# Patient Record
Sex: FEMALE | Race: BLACK OR AFRICAN AMERICAN | NOT HISPANIC OR LATINO | Employment: OTHER | URBAN - METROPOLITAN AREA
[De-identification: names, ages, dates, MRNs, and addresses within clinical notes are randomized per-mention and may not be internally consistent; named-entity substitution may affect disease eponyms.]

---

## 2017-02-05 ENCOUNTER — HOSPITAL ENCOUNTER (EMERGENCY)
Facility: HOSPITAL | Age: 72
Discharge: HOME | End: 2017-02-05
Attending: EMERGENCY MEDICINE
Payer: MEDICARE

## 2017-02-05 VITALS
TEMPERATURE: 98 F | HEIGHT: 63 IN | DIASTOLIC BLOOD PRESSURE: 81 MMHG | RESPIRATION RATE: 20 BRPM | WEIGHT: 190 LBS | SYSTOLIC BLOOD PRESSURE: 180 MMHG | OXYGEN SATURATION: 100 % | HEART RATE: 67 BPM | BODY MASS INDEX: 33.66 KG/M2

## 2017-02-05 DIAGNOSIS — R42 LIGHTHEADED: Primary | ICD-10-CM

## 2017-02-05 DIAGNOSIS — R51.9 HEADACHE, UNSPECIFIED HEADACHE TYPE: ICD-10-CM

## 2017-02-05 LAB
ALBUMIN SERPL BCP-MCNC: 3.6 G/DL
ALP SERPL-CCNC: 70 U/L
ALT SERPL W/O P-5'-P-CCNC: 11 U/L
ANION GAP SERPL CALC-SCNC: 7 MMOL/L
AST SERPL-CCNC: 14 U/L
BASOPHILS # BLD AUTO: 0.06 K/UL
BASOPHILS NFR BLD: 0.8 %
BILIRUB SERPL-MCNC: 0.6 MG/DL
BILIRUB UR QL STRIP: NEGATIVE
BUN SERPL-MCNC: 13 MG/DL
CALCIUM SERPL-MCNC: 9 MG/DL
CHLORIDE SERPL-SCNC: 101 MMOL/L
CLARITY UR REFRACT.AUTO: CLEAR
CO2 SERPL-SCNC: 30 MMOL/L
COLOR UR AUTO: YELLOW
CREAT SERPL-MCNC: 0.7 MG/DL
DIFFERENTIAL METHOD: NORMAL
EOSINOPHIL # BLD AUTO: 0.2 K/UL
EOSINOPHIL NFR BLD: 2.8 %
ERYTHROCYTE [DISTWIDTH] IN BLOOD BY AUTOMATED COUNT: 13.1 %
EST. GFR  (AFRICAN AMERICAN): >60 ML/MIN/1.73 M^2
EST. GFR  (NON AFRICAN AMERICAN): >60 ML/MIN/1.73 M^2
GLUCOSE SERPL-MCNC: 103 MG/DL
GLUCOSE UR QL STRIP: NEGATIVE
HCT VFR BLD AUTO: 39.6 %
HGB BLD-MCNC: 13 G/DL
HGB UR QL STRIP: NEGATIVE
KETONES UR QL STRIP: NEGATIVE
LEUKOCYTE ESTERASE UR QL STRIP: NEGATIVE
LYMPHOCYTES # BLD AUTO: 3.2 K/UL
LYMPHOCYTES NFR BLD: 41.7 %
MCH RBC QN AUTO: 28.4 PG
MCHC RBC AUTO-ENTMCNC: 32.8 %
MCV RBC AUTO: 87 FL
MONOCYTES # BLD AUTO: 0.5 K/UL
MONOCYTES NFR BLD: 6 %
NEUTROPHILS # BLD AUTO: 3.8 K/UL
NEUTROPHILS NFR BLD: 48.6 %
NITRITE UR QL STRIP: NEGATIVE
PH UR STRIP: 7 [PH] (ref 5–8)
PLATELET # BLD AUTO: 272 K/UL
PMV BLD AUTO: 9.2 FL
POCT GLUCOSE: 107 MG/DL (ref 70–110)
POTASSIUM SERPL-SCNC: 3.8 MMOL/L
PROT SERPL-MCNC: 7.6 G/DL
PROT UR QL STRIP: NEGATIVE
RBC # BLD AUTO: 4.58 M/UL
SODIUM SERPL-SCNC: 138 MMOL/L
SP GR UR STRIP: 1.01 (ref 1–1.03)
TROPONIN I SERPL DL<=0.01 NG/ML-MCNC: 0.01 NG/ML
URN SPEC COLLECT METH UR: NORMAL
UROBILINOGEN UR STRIP-ACNC: NEGATIVE EU/DL
WBC # BLD AUTO: 7.77 K/UL

## 2017-02-05 PROCEDURE — 25000003 PHARM REV CODE 250: Performed by: EMERGENCY MEDICINE

## 2017-02-05 PROCEDURE — 93010 ELECTROCARDIOGRAM REPORT: CPT | Mod: ,,, | Performed by: INTERNAL MEDICINE

## 2017-02-05 PROCEDURE — 80053 COMPREHEN METABOLIC PANEL: CPT

## 2017-02-05 PROCEDURE — 63600175 PHARM REV CODE 636 W HCPCS: Performed by: EMERGENCY MEDICINE

## 2017-02-05 PROCEDURE — 87086 URINE CULTURE/COLONY COUNT: CPT

## 2017-02-05 PROCEDURE — 85025 COMPLETE CBC W/AUTO DIFF WBC: CPT

## 2017-02-05 PROCEDURE — 81003 URINALYSIS AUTO W/O SCOPE: CPT

## 2017-02-05 PROCEDURE — 96361 HYDRATE IV INFUSION ADD-ON: CPT

## 2017-02-05 PROCEDURE — 99284 EMERGENCY DEPT VISIT MOD MDM: CPT | Mod: ,,, | Performed by: EMERGENCY MEDICINE

## 2017-02-05 PROCEDURE — 84484 ASSAY OF TROPONIN QUANT: CPT

## 2017-02-05 PROCEDURE — 99284 EMERGENCY DEPT VISIT MOD MDM: CPT | Mod: 25

## 2017-02-05 PROCEDURE — 96374 THER/PROPH/DIAG INJ IV PUSH: CPT

## 2017-02-05 PROCEDURE — 93005 ELECTROCARDIOGRAM TRACING: CPT

## 2017-02-05 RX ORDER — DIPHENHYDRAMINE HCL 25 MG
25 CAPSULE ORAL
Status: COMPLETED | OUTPATIENT
Start: 2017-02-05 | End: 2017-02-05

## 2017-02-05 RX ORDER — METOCLOPRAMIDE HYDROCHLORIDE 5 MG/ML
INJECTION INTRAMUSCULAR; INTRAVENOUS
Status: DISPENSED
Start: 2017-02-05 | End: 2017-02-05

## 2017-02-05 RX ORDER — METOCLOPRAMIDE HYDROCHLORIDE 5 MG/ML
10 INJECTION INTRAMUSCULAR; INTRAVENOUS
Status: COMPLETED | OUTPATIENT
Start: 2017-02-05 | End: 2017-02-05

## 2017-02-05 RX ORDER — ATORVASTATIN CALCIUM 20 MG/1
20 TABLET, FILM COATED ORAL DAILY
COMMUNITY
End: 2022-08-04 | Stop reason: SDUPTHER

## 2017-02-05 RX ORDER — DIPHENHYDRAMINE HCL 25 MG
CAPSULE ORAL
Status: DISPENSED
Start: 2017-02-05 | End: 2017-02-06

## 2017-02-05 RX ORDER — ASPIRIN 81 MG/1
81 TABLET ORAL DAILY
COMMUNITY

## 2017-02-05 RX ADMIN — SODIUM CHLORIDE 500 ML: 0.9 INJECTION, SOLUTION INTRAVENOUS at 12:02

## 2017-02-05 RX ADMIN — DIPHENHYDRAMINE HYDROCHLORIDE 25 MG: 25 CAPSULE ORAL at 12:02

## 2017-02-05 RX ADMIN — METOCLOPRAMIDE 10 MG: 5 INJECTION, SOLUTION INTRAMUSCULAR; INTRAVENOUS at 12:02

## 2017-02-05 NOTE — ED NOTES
Sitting in chair visiting with daughter at bedside. No distress noted. Waiting to speak to MD. Denies further needs. Safety maintained. Call bell in reach.

## 2017-02-05 NOTE — ED NOTES
Patient identifiers verified and correct for eLsly Cecil.   LOC: The patient is awake, alert and aware of environment with an appropriate affect, the patient is oriented x 3 and speaking appropriately.   APPEARANCE: Patient appears comfortable and in no acute distress, patient is clean and well groomed.  SKIN: The skin is warm and dry, color consistent with ethnicity, patient has normal skin turgor and moist mucus membranes, skin intact, no breakdown or bruising noted.   MUSCULOSKELETAL: Patient moving all extremities spontaneously, no swelling noted.  RESPIRATORY: Airway is open and patent, respirations are spontaneous, patient has a normal effort and rate, no accessory muscle use noted, pt O2 sats noted at 97% on room air.  CARDIAC:Patient has a normal rate and regular rhythm, no edema noted, capillary refill < 3 seconds.   GASTRO: Soft and non tender to palpation, no distention noted, normoactive bowel sounds present in all four quadrants. Pt states bowel movements have been regular.  : Pt denies any pain or frequency with urination.  NEURO: Pt opens eyes spontaneously, behavior appropriate to situation, follows commands, facial expression symmetrical, bilateral hand grasp equal and even, purposeful motor response noted, normal sensation in all extremities when touched with a finger.

## 2017-02-05 NOTE — DISCHARGE INSTRUCTIONS
Dizziness (Uncertain Cause)  Dizziness is a common symptom. It may be described as lightheadedness, spinning, or feeling like you are going to faint. Dizziness can have many causes.  Be sure to tell the healthcare provider about:  · All medicines you take, including prescription, over-the-counter, herbs, and supplements  · Any other symptoms you have  · Any health problems you are being treated for  · Anything that causes the dizziness to get worse or better  Today's exam did not show an exact cause for your dizziness. Other tests may be needed. Follow up with your healthcare provider.  Home care  · Dizziness that occurs with sudden standing may be a sign of mild dehydration. Drink extra fluids for the next few days.  · If you recently started a new medicine, stopped a medicine, or had the dose of a current medicine changed, talk with the prescribing healthcare provider. Your medicine plan may need adjustment.  · If dizziness lasts more than a few seconds, sit or lie down until it passes. This may help prevent injury in case you pass out.  · Do not drive or use power tools or dangerous equipment until you have had no dizziness for at least 48 hours.  Follow-up care  Follow up with your healthcare provider for further evaluation within the next 7 days or as advised.  When to seek medical advice  Call your healthcare provider for any of the following:  · Worsening of symptoms or new symptoms  · Passing out or seizure  · Repeated vomiting  · Headache  · Palpitations (the sense that your heart is fluttering or beating fast or hard)  · Shortness of breath  · Blood in vomit or stool (black or red color)  · Weakness of an arm or leg or one side of the face  · Vision or hearing changes  · Trouble walking or speaking  · Chest, arm, neck, back, or jaw pain  Date Last Reviewed: 8/23/2015  © 8062-4361 [employer]. [address] [place] [address], [place] [address]. All rights reserved. This information is not intended as  "a substitute for professional medical care. Always follow your healthcare professional's instructions.        Headache, Unspecified    A number of things can cause headaches. The cause of your headache isnt clear. But it doesnt seem to be a sign of any serious illness.  You could have a tension headache or a migraine headache.  Stress can cause a tension headache. This can happen if you tense the muscles of your shoulders, neck, and scalp without knowing it. If this stress lasts long enough, you may develop a tension headache.  It is not clear why migraines occur, but certain things called" triggers" can raise the risk of having a migraine attack. Migraine triggers may include emotional stress or depression, or by hormone changes during the menstrual cycle. Other triggers include birth control pills and other medicines, alcohol or caffeine, foods with tyramine (such as aged cheese, wine), eyestrain, weather changes, missed meals, and lack of sleep or oversleeping.  Other causes of headache include:  · Viral illness with high fever  · Head injury with concussion  · Sinus, ear, or throat infection  · Dental pain and jaw joint (TMJ) pain  More serious but less common causes of headache include stroke, brain hemorrhage, brain tumor, meningitis, and encephalitis.  Home care  Follow these tips when taking care of yourself at home:  · Dont drive yourself home if you were given pain medicine for your headache. Instead, have someone else drive you home. Try to sleep when you get home. You should feel much better when you wake up.  · Apply heat to the back of your neck to ease a neck muscle spasm. Take care of a migraine headache by putting an ice pack on your forehead or at the base of your skull.  · If you have nausea or vomiting, eat a light diet until your headache eases.  · If you have a migraine headache, use sunglasses when in the daylight or around bright indoor lighting until your symptoms get better. Bright " glaring light can make this type of headache worse.  Follow-up care  Follow up with your healthcare provider, or as advised. Talk with your provider if you have frequent headaches. He or she can help figure out a treatment plan. By knowing the earliest signs of headache, and starting treatment right away, you may be able to stop the pain yourself.  When to seek medical advice  Call your healthcare provider right away if any of these occur:  · Your head pain suddenly gets worse after sexual intercourse or strenuous activity  · Your head pain doesnt get better within 24 hours  · You arent able to keep liquids down (repeated vomiting)  · Fever of 100.4ºF (38ºC) or higher, or as directed by your healthcare provider  · Stiff neck  · Extreme drowsiness, confusion, or fainting  · Dizziness or dizziness with spinning sensation (vertigo)  · Weakness in an arm or leg or one side of your face  · You have trouble talking or seeing  Date Last Reviewed: 8/1/2016  © 3603-3490 [employer] [address] [place] [address], [place] [address]. All rights reserved. This information is not intended as a substitute for professional medical care. Always follow your healthcare professional's instructions.

## 2017-02-05 NOTE — ED NOTES
Resting in bed. AAOX4. No distress noted. Denies c/o headache or N/V at present. Safety maintained. Call bell in reach. Family member at bedside.

## 2017-02-05 NOTE — ED NOTES
"Pt reports feeling "anxious" after receiving reglan IVP. Denies chest pain or SOB. No acute distress noted. /79, HR 75, room air sats 100%, Resp 20. MD notified.   "
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
## 2017-02-05 NOTE — ED AVS SNAPSHOT
[facility]  [address] [facility] [address]               Lesly Jacob   2017 10:21 AM   ED    Description:  Female : 1945   Department:  [facility]           Your Care was Coordinated By:     Provider Role From To    Axel Najera MD Attending Provider 17 1205 --      Reason for Visit     Headache           Diagnoses this Visit        Comments    Lightheaded    -  Primary     Headache, unspecified headache type           ED Disposition     None           To Do List           Follow-up Information     Schedule an appointment as soon as possible for a visit with Karson Beauchamp MD.    Specialty:  Cardiology    Why:  for follow up this week     Contact information:    [address] [facility] [address]  [phone]          Follow up with [facility].    Specialty:  Emergency Medicine    Why:  If symptoms worsen or you develop any new, concerning symptoms return immediately    Contact information:    [address] [facility] [id number] [phone]      [facility]     [facility] -  Assistance  Registered nurses in the [facility] provide clinical advisement, health education, appointment booking, and other advisory services.  Call for this free service at [phone].             Medications           Message regarding Medications     Verify the changes and/or additions to your medication regime listed below are the same as discussed with your clinician today.  If any of these changes or additions are incorrect, please notify your healthcare provider.        These medications were administered today        Dose Freq    sodium chloride 0.9% bolus 500 mL 500 mL ED 1 Time    Sig: Inject 500 mLs into the vein ED 1 Time.    Class: Normal    Route: Intravenous    metoclopramide HCl injection 10 mg 10 mg ED 1 Time    Sig: Inject  "2 mLs (10 mg total) into the vein ED 1 Time.    Class: Normal    Route: Intravenous    diphenhydrAMINE capsule 25 mg 25 mg ED 1 Time    Sig: Take 1 each (25 mg total) by mouth ED 1 Time.    Class: Normal    Route: Oral           Verify that the below list of medications is an accurate representation of the medications you are currently taking.  If none reported, the list may be blank. If incorrect, please contact your healthcare provider. Carry this list with you in case of emergency.           Current Medications     amlodipine (NORVASC) 10 MG tablet Take 10 mg by mouth once daily.    aspirin (ECOTRIN) 81 MG EC tablet Take 81 mg by mouth once daily.    atorvastatin (LIPITOR) 20 MG tablet Take 20 mg by mouth once daily.    losartan (COZAAR) 100 MG tablet Take 100 mg by mouth once daily.    metformin (GLUCOPHAGE) 500 MG tablet Take 500 mg by mouth 2 (two) times daily with meals.    albuterol 90 mcg/actuation HFAA Inhale 1-2 puffs into the lungs every 6 (six) hours as needed.    benzonatate (TESSALON) 100 MG capsule Take 100 mg by mouth 3 (three) times daily as needed.    calcium carbonate-vitamin D3 (CALCIUM 500 + D, D3,) 500-125 mg-unit per tablet Take 1 tablet by mouth once daily.      cetirizine (ZYRTEC) 10 MG tablet Take 1 tablet (10 mg total) by mouth once daily.    hydrochlorothiazide (MICROZIDE) 12.5 mg capsule Take 25 mg by mouth once daily.     metoprolol succinate (TOPROL-XL) 50 MG 24 hr tablet Take 50 mg by mouth once daily.    propylene glycol (SYSTANE BALANCE) 0.6 % Drop Apply 1 drop to eye 4 (four) times daily as needed.           Clinical Reference Information           Your Vitals Were     BP Pulse Temp Resp Height Weight    174/79 (BP Location: Right arm, Patient Position: Standing, BP Method: Automatic) 69 98.1 °F (36.7 °C) (Oral) 18 5' 3" (1.6 m) 86.2 kg (190 lb)    SpO2 BMI             99% 33.66 kg/m2         Allergies as of 2/5/2017        Reactions    Amoxicillin Nausea And Vomiting    "   Immunizations Administered on Date of Encounter - 2/5/2017     None      ED Micro, Lab, POCT     Start Ordered       Status Ordering Provider    02/05/17 1204 02/05/17 1204  Urine culture  Once      In process     02/05/17 1151 02/05/17 1151  POCT glucose  Once      Final result     02/05/17 1044 02/05/17 1045  Urine culture  Add-on      Completed     02/05/17 1043 02/05/17 1045  CBC auto differential  STAT      Final result     02/05/17 1043 02/05/17 1045  Comprehensive metabolic panel  STAT      Final result     02/05/17 1043 02/05/17 1045  Troponin I  STAT      Final result     02/05/17 1043 02/05/17 1045  POCT glucose  Once      Acknowledged     02/05/17 1043 02/05/17 1045  Urinalysis  STAT      Final result       ED Imaging Orders     Start Ordered       Status Ordering Provider    02/05/17 1058 02/05/17 1058    1 time imaging,   Status:  Canceled      Canceled     02/05/17 1043 02/05/17 1045  CT Head Without Contrast  1 time imaging      Preliminary result         Discharge Instructions         Dizziness (Uncertain Cause)  Dizziness is a common symptom. It may be described as lightheadedness, spinning, or feeling like you are going to faint. Dizziness can have many causes.  Be sure to tell the healthcare provider about:  · All medicines you take, including prescription, over-the-counter, herbs, and supplements  · Any other symptoms you have  · Any health problems you are being treated for  · Anything that causes the dizziness to get worse or better  Today's exam did not show an exact cause for your dizziness. Other tests may be needed. Follow up with your healthcare provider.  Home care  · Dizziness that occurs with sudden standing may be a sign of mild dehydration. Drink extra fluids for the next few days.  · If you recently started a new medicine, stopped a medicine, or had the dose of a current medicine changed, talk with the prescribing healthcare provider. Your medicine plan may need adjustment.  · If  "dizziness lasts more than a few seconds, sit or lie down until it passes. This may help prevent injury in case you pass out.  · Do not drive or use power tools or dangerous equipment until you have had no dizziness for at least 48 hours.  Follow-up care  Follow up with your healthcare provider for further evaluation within the next 7 days or as advised.  When to seek medical advice  Call your healthcare provider for any of the following:  · Worsening of symptoms or new symptoms  · Passing out or seizure  · Repeated vomiting  · Headache  · Palpitations (the sense that your heart is fluttering or beating fast or hard)  · Shortness of breath  · Blood in vomit or stool (black or red color)  · Weakness of an arm or leg or one side of the face  · Vision or hearing changes  · Trouble walking or speaking  · Chest, arm, neck, back, or jaw pain  Date Last Reviewed: 8/23/2015  © 4585-4320 [employer] [address] [place] [address]. All rights reserved. This information is not intended as a substitute for professional medical care. Always follow your healthcare professional's instructions.        Headache, Unspecified    A number of things can cause headaches. The cause of your headache isnt clear. But it doesnt seem to be a sign of any serious illness.  You could have a tension headache or a migraine headache.  Stress can cause a tension headache. This can happen if you tense the muscles of your shoulders, neck, and scalp without knowing it. If this stress lasts long enough, you may develop a tension headache.  It is not clear why migraines occur, but certain things called" triggers" can raise the risk of having a migraine attack. Migraine triggers may include emotional stress or depression, or by hormone changes during the menstrual cycle. Other triggers include birth control pills and other medicines, alcohol or caffeine, foods with tyramine (such as aged cheese, wine), eyestrain, weather changes, " missed meals, and lack of sleep or oversleeping.  Other causes of headache include:  · Viral illness with high fever  · Head injury with concussion  · Sinus, ear, or throat infection  · Dental pain and jaw joint (TMJ) pain  More serious but less common causes of headache include stroke, brain hemorrhage, brain tumor, meningitis, and encephalitis.  Home care  Follow these tips when taking care of yourself at home:  · Dont drive yourself home if you were given pain medicine for your headache. Instead, have someone else drive you home. Try to sleep when you get home. You should feel much better when you wake up.  · Apply heat to the back of your neck to ease a neck muscle spasm. Take care of a migraine headache by putting an ice pack on your forehead or at the base of your skull.  · If you have nausea or vomiting, eat a light diet until your headache eases.  · If you have a migraine headache, use sunglasses when in the daylight or around bright indoor lighting until your symptoms get better. Bright glaring light can make this type of headache worse.  Follow-up care  Follow up with your healthcare provider, or as advised. Talk with your provider if you have frequent headaches. He or she can help figure out a treatment plan. By knowing the earliest signs of headache, and starting treatment right away, you may be able to stop the pain yourself.  When to seek medical advice  Call your healthcare provider right away if any of these occur:  · Your head pain suddenly gets worse after sexual intercourse or strenuous activity  · Your head pain doesnt get better within 24 hours  · You arent able to keep liquids down (repeated vomiting)  · Fever of 100.4ºF (38ºC) or higher, or as directed by your healthcare provider  · Stiff neck  · Extreme drowsiness, confusion, or fainting  · Dizziness or dizziness with spinning sensation (vertigo)  · Weakness in an arm or leg or one side of your face  · You have trouble talking or  seeing  Date Last Reviewed: 8/1/2016  © 8594-5441 [employer] [address] [place] [address], [place] [address]. All rights reserved. This information is not intended as a substitute for professional medical care. Always follow your healthcare professional's instructions.          [facility] Sign-Up     Activating your [facility] account is as easy as 1-2-3!     1) Visit [email or web address], select Sign Up Now, enter this activation code and your date of birth, then select Next.  [email or web address]  Expires: 3/22/2017  3:02 PM      2) Create a username and password to use when you visit [facility] in the future and select a security question in case you lose your password and select Next.    3) Enter your e-mail address and click Sign Up!    Additional Information  If you have questions, please e-mail [email or web address] or call [phone] to talk to our [facility] staff. Remember, [facility] is NOT to be used for urgent needs. For medical emergencies, dial 911.          [facility] complies with applicable Federal civil rights laws and does not discriminate on the basis of race, color, national origin, age, disability, or sex.        Language Assistance Services     ATTENTION: Language assistance services are available, free of charge. Please call [phone].      ATENCIÓN: Si habla español, tiene a medina disposición servicios gratuitos de asistencia lingüística. Llame al [phone].     CHÚ Ý: N?u b?n nói Ti?ng Vi?t, có các d?ch v? h? tr? ngôn ng? mi?n phí dành cho b?n. G?i s? [phone].

## 2017-02-06 LAB — BACTERIA UR CULT: NO GROWTH

## 2017-08-16 PROCEDURE — 99284 EMERGENCY DEPT VISIT MOD MDM: CPT | Mod: ,,, | Performed by: PHYSICIAN ASSISTANT

## 2017-08-16 PROCEDURE — 99283 EMERGENCY DEPT VISIT LOW MDM: CPT

## 2017-08-17 ENCOUNTER — HOSPITAL ENCOUNTER (EMERGENCY)
Facility: HOSPITAL | Age: 72
Discharge: HOME | End: 2017-08-17
Attending: EMERGENCY MEDICINE | Admitting: EMERGENCY MEDICINE
Payer: MEDICARE

## 2017-08-17 VITALS
OXYGEN SATURATION: 97 % | TEMPERATURE: 99 F | RESPIRATION RATE: 18 BRPM | SYSTOLIC BLOOD PRESSURE: 172 MMHG | WEIGHT: 192 LBS | HEART RATE: 66 BPM | BODY MASS INDEX: 37.69 KG/M2 | HEIGHT: 60 IN | DIASTOLIC BLOOD PRESSURE: 74 MMHG

## 2017-08-17 DIAGNOSIS — B35.4 TINEA CORPORIS: Primary | ICD-10-CM

## 2017-08-17 PROCEDURE — 25000003 PHARM REV CODE 250: Performed by: PHYSICIAN ASSISTANT

## 2017-08-17 RX ORDER — MICONAZOLE NITRATE 2 %
POWDER (GRAM) TOPICAL 2 TIMES DAILY
Status: DISCONTINUED | OUTPATIENT
Start: 2017-08-17 | End: 2017-08-17 | Stop reason: HOSPADM

## 2017-08-17 RX ORDER — CLOTRIMAZOLE 1 %
CREAM (GRAM) TOPICAL
Qty: 60 G | Refills: 2 | Status: SHIPPED | OUTPATIENT
Start: 2017-08-17

## 2017-08-17 RX ADMIN — MICONAZOLE NITRATE: 20 POWDER TOPICAL at 04:08

## 2017-08-17 NOTE — ED PROVIDER NOTES
Encounter Date: 8/16/2017    SCRIBE #1 NOTE: I, Vee Ramirez, am scribing for, and in the presence of,  Dr. Klein. I have scribed the following portions of the note - the APC attestation.       History     Chief Complaint   Patient presents with    Rash     to low abdominal area. pt denies any new medication, detergent or food     71-year-old female presents to the ER for evaluation of a rash.  Rash is present bilaterally below her abdominal fold.  Rash has been present now for 3 days and worsening.  She describes diffuse itching and burning.  She has no fever chills nausea or vomiting.  She has never had this rash before.  She has tried applying cortisone cream to the area without any improvement.          Review of patient's allergies indicates:   Allergen Reactions    Amoxicillin Nausea And Vomiting     Past Medical History:   Diagnosis Date    Diabetes mellitus     Hypertension     Pneumonia     Pneumonia      Past Surgical History:   Procedure Laterality Date    HYSTERECTOMY       No family history on file.  Social History   Substance Use Topics    Smoking status: Never Smoker    Smokeless tobacco: Not on file    Alcohol use No     Review of Systems   Constitutional: Negative for fever.   HENT: Negative for sore throat.    Respiratory: Negative for shortness of breath.    Cardiovascular: Negative for chest pain.   Gastrointestinal: Negative for nausea.   Genitourinary: Negative for dysuria.   Musculoskeletal: Negative for back pain.   Skin: Positive for rash.   Neurological: Negative for weakness.   Hematological: Does not bruise/bleed easily.       Physical Exam     Initial Vitals [08/16/17 2016]   BP Pulse Resp Temp SpO2   134/60 76 19 98.5 °F (36.9 °C) 96 %      MAP       84.67         Physical Exam    Constitutional: Vital signs are normal. She appears well-developed and well-nourished.   HENT:   Head: Normocephalic and atraumatic.   Eyes: Conjunctivae are normal.   Cardiovascular: Normal rate  and regular rhythm.   Abdominal: Soft. Normal appearance, normal aorta and bowel sounds are normal.   Musculoskeletal: Normal range of motion.   Neurological: She is alert and oriented to person, place, and time.   Skin: Skin is warm and intact.        Both inguinal creases / just below the abdomen, the area is erythematous and macerated.  The area is very tender to exam.  No abscesses.  No drainage.   Psychiatric: She has a normal mood and affect. Her speech is normal and behavior is normal. Cognition and memory are normal.         ED Course   Procedures  Labs Reviewed - No data to display          Medical Decision Making:   History:   Old Medical Records: I decided to obtain old medical records.  ED Management:  71-year-old female with what appears to be a tinea infection to the skin folds of the lower abdomen.  I will treat in the ED with nystatin and discharged home with clotrimazole.  Recommended Benadryl for itching.  Return instructions given.  Patient stable.            Scribe Attestation:   Scribe #1: I performed the above scribed service and the documentation accurately describes the services I performed. I attest to the accuracy of the note.    Attending Attestation:     Physician Attestation Statement for NP/PA:   I discussed this assessment and plan of this patient with the NP/PA, but I did not personally examine the patient. The face to face encounter was performed by the NP/PA.    Other NP/PA Attestation Additions:    History of Present Illness: Rash         Physician Attestation for Scribe:  Physician Attestation Statement for Scribe #1: I, Dr. Klein, reviewed documentation, as scribed by Vee Ramirez in my presence, and it is both accurate and complete.                 ED Course     Clinical Impression:   The encounter diagnosis was Tinea corporis.    Disposition:   Disposition: Discharged  Condition: Stable                        Carlton Erazo PA-C  08/17/17 0412       Mushtaq Klein III,  MD  08/17/17 0419

## 2017-08-17 NOTE — DISCHARGE INSTRUCTIONS
Use topical cream as directed twice daily  Follow-up with your primary care provider  Return to the ER for any new symptoms  Take Benadryl over-the-counter, 50 mg as needed for itching

## 2017-08-17 NOTE — ED TRIAGE NOTES
Lesly Jacob, a 71 y.o. female presents to the ED c/o skin breakdown below abdomen. Pt reports attempting multiple creams without relief. +Itching.      Chief Complaint   Patient presents with    Rash     to low abdominal area. pt denies any new medication, detergent or food     Review of patient's allergies indicates:   Allergen Reactions    Amoxicillin Nausea And Vomiting     Past Medical History:   Diagnosis Date    Diabetes mellitus     Hypertension     Pneumonia     Pneumonia         Adult Physical Assessment  LOC: Lesly Jacob, 71 y.o. female verified via two identifiers.  The patient is awake, alert, oriented and speaking appropriately at this time.  APPEARANCE: Patient resting comfortably and appears to be in no acute distress at this time. Patient is clean and well groomed, patient's clothing is properly fastened.  SKIN:The skin is warm and dry, color consistent with ethnicity, patient has normal skin turgor and moist mucus membranes, no breakdown or brusing noted. Skin breakdown and irritation noted to lower abdomen.   MUSCULOSKELETAL: Patient moving all extremities well, no obvious swelling or deformities noted.  RESPIRATORY: Airway is open and patent, respirations are spontaneous, patient has a normal effort and rate, no accessory muscle use noted.  CARDIAC: Patient has a normal rate and rhythm, no periphreal edema noted in any extremity, capillary refill < 3 seconds in all extremities  ABDOMEN: Soft and non tender to palpation, no abdominal distention noted. Bowel sounds present in all four quadrants.  NEUROLOGIC: Eyes open spontaneously, behavior appropriate to situation, follows commands, facial expression symmetrical, bilateral hand grasp equal and even, purposeful motor response noted, normal sensation in all extremities when touched with a finger.

## 2017-10-22 ENCOUNTER — HOSPITAL ENCOUNTER (EMERGENCY)
Facility: HOSPITAL | Age: 72
Discharge: HOME | End: 2017-10-22
Attending: EMERGENCY MEDICINE
Payer: MEDICARE

## 2017-10-22 VITALS
BODY MASS INDEX: 32.44 KG/M2 | RESPIRATION RATE: 17 BRPM | SYSTOLIC BLOOD PRESSURE: 157 MMHG | HEART RATE: 70 BPM | TEMPERATURE: 98 F | DIASTOLIC BLOOD PRESSURE: 71 MMHG | OXYGEN SATURATION: 98 % | HEIGHT: 64 IN | WEIGHT: 190 LBS

## 2017-10-22 DIAGNOSIS — R00.2 PALPITATIONS: Primary | ICD-10-CM

## 2017-10-22 DIAGNOSIS — R53.1 WEAKNESS: ICD-10-CM

## 2017-10-22 LAB
ALBUMIN SERPL BCP-MCNC: 3.5 G/DL
ALP SERPL-CCNC: 90 U/L
ALT SERPL W/O P-5'-P-CCNC: 14 U/L
ANION GAP SERPL CALC-SCNC: 16 MMOL/L
AST SERPL-CCNC: 17 U/L
BACTERIA #/AREA URNS AUTO: ABNORMAL /HPF
BASOPHILS # BLD AUTO: 0.07 K/UL
BASOPHILS NFR BLD: 0.6 %
BILIRUB SERPL-MCNC: 0.3 MG/DL
BILIRUB UR QL STRIP: NEGATIVE
BUN SERPL-MCNC: 16 MG/DL
CALCIUM SERPL-MCNC: 9.2 MG/DL
CHLORIDE SERPL-SCNC: 103 MMOL/L
CLARITY UR REFRACT.AUTO: CLEAR
CO2 SERPL-SCNC: 22 MMOL/L
COLOR UR AUTO: COLORLESS
CREAT SERPL-MCNC: 1 MG/DL
DIFFERENTIAL METHOD: ABNORMAL
EOSINOPHIL # BLD AUTO: 0.3 K/UL
EOSINOPHIL NFR BLD: 2.8 %
ERYTHROCYTE [DISTWIDTH] IN BLOOD BY AUTOMATED COUNT: 13.2 %
EST. GFR  (AFRICAN AMERICAN): >60 ML/MIN/1.73 M^2
EST. GFR  (NON AFRICAN AMERICAN): 56 ML/MIN/1.73 M^2
GLUCOSE SERPL-MCNC: 208 MG/DL
GLUCOSE UR QL STRIP: ABNORMAL
HCT VFR BLD AUTO: 36.7 %
HGB BLD-MCNC: 12.3 G/DL
HGB UR QL STRIP: ABNORMAL
IMM GRANULOCYTES # BLD AUTO: 0.07 K/UL
IMM GRANULOCYTES NFR BLD AUTO: 0.6 %
KETONES UR QL STRIP: NEGATIVE
LEUKOCYTE ESTERASE UR QL STRIP: ABNORMAL
LYMPHOCYTES # BLD AUTO: 4.9 K/UL
LYMPHOCYTES NFR BLD: 45.7 %
MCH RBC QN AUTO: 28.9 PG
MCHC RBC AUTO-ENTMCNC: 33.5 G/DL
MCV RBC AUTO: 86 FL
MICROSCOPIC COMMENT: ABNORMAL
MONOCYTES # BLD AUTO: 0.7 K/UL
MONOCYTES NFR BLD: 6.4 %
NEUTROPHILS # BLD AUTO: 4.7 K/UL
NEUTROPHILS NFR BLD: 43.9 %
NITRITE UR QL STRIP: NEGATIVE
NRBC BLD-RTO: 0 /100 WBC
PH UR STRIP: 6 [PH] (ref 5–8)
PLATELET # BLD AUTO: 319 K/UL
PMV BLD AUTO: 9.2 FL
POTASSIUM SERPL-SCNC: 2.7 MMOL/L
PROT SERPL-MCNC: 8 G/DL
PROT UR QL STRIP: NEGATIVE
RBC # BLD AUTO: 4.26 M/UL
RBC #/AREA URNS AUTO: 1 /HPF (ref 0–4)
SODIUM SERPL-SCNC: 141 MMOL/L
SP GR UR STRIP: 1 (ref 1–1.03)
SQUAMOUS #/AREA URNS AUTO: 1 /HPF
TROPONIN I SERPL DL<=0.01 NG/ML-MCNC: <0.006 NG/ML
TSH SERPL DL<=0.005 MIU/L-ACNC: 1.53 UIU/ML
URN SPEC COLLECT METH UR: ABNORMAL
UROBILINOGEN UR STRIP-ACNC: NEGATIVE EU/DL
WBC # BLD AUTO: 10.78 K/UL
WBC #/AREA URNS AUTO: 6 /HPF (ref 0–5)
YEAST UR QL AUTO: ABNORMAL

## 2017-10-22 PROCEDURE — 84484 ASSAY OF TROPONIN QUANT: CPT

## 2017-10-22 PROCEDURE — 99284 EMERGENCY DEPT VISIT MOD MDM: CPT | Mod: 25

## 2017-10-22 PROCEDURE — 85025 COMPLETE CBC W/AUTO DIFF WBC: CPT

## 2017-10-22 PROCEDURE — 81001 URINALYSIS AUTO W/SCOPE: CPT

## 2017-10-22 PROCEDURE — 96366 THER/PROPH/DIAG IV INF ADDON: CPT

## 2017-10-22 PROCEDURE — 96365 THER/PROPH/DIAG IV INF INIT: CPT

## 2017-10-22 PROCEDURE — 84443 ASSAY THYROID STIM HORMONE: CPT

## 2017-10-22 PROCEDURE — 25000003 PHARM REV CODE 250: Performed by: GENERAL PRACTICE

## 2017-10-22 PROCEDURE — 80053 COMPREHEN METABOLIC PANEL: CPT

## 2017-10-22 PROCEDURE — 99284 EMERGENCY DEPT VISIT MOD MDM: CPT | Mod: ,,, | Performed by: EMERGENCY MEDICINE

## 2017-10-22 PROCEDURE — 93010 ELECTROCARDIOGRAM REPORT: CPT | Mod: ,,, | Performed by: INTERNAL MEDICINE

## 2017-10-22 PROCEDURE — 63600175 PHARM REV CODE 636 W HCPCS: Performed by: GENERAL PRACTICE

## 2017-10-22 PROCEDURE — 93005 ELECTROCARDIOGRAM TRACING: CPT

## 2017-10-22 RX ORDER — POTASSIUM CHLORIDE 7.45 MG/ML
10 INJECTION INTRAVENOUS
Status: COMPLETED | OUTPATIENT
Start: 2017-10-22 | End: 2017-10-22

## 2017-10-22 RX ORDER — POTASSIUM CHLORIDE 20 MEQ/15ML
60 SOLUTION ORAL
Status: COMPLETED | OUTPATIENT
Start: 2017-10-22 | End: 2017-10-22

## 2017-10-22 RX ADMIN — POTASSIUM CHLORIDE 60 MEQ: 20 SOLUTION ORAL at 03:10

## 2017-10-22 RX ADMIN — POTASSIUM CHLORIDE 10 MEQ: 10 INJECTION, SOLUTION INTRAVENOUS at 03:10

## 2017-10-22 NOTE — ED TRIAGE NOTES
"Pt reports feeling "jittery" that began around 2330. Denies chest pain or SOB. Reports hot flashes. Pt states that she feels like she may have an anxiety attack but does not want to have one. Pt calm and in no distress at this time.   "

## 2017-10-22 NOTE — MEDICAL/APP STUDENT
History     Chief Complaint   Patient presents with    Dizziness     Pt reports dizziness and weakness that began around 2330. Denies chest pain or SOB. Reports hot flashes.    Fatigue     HPI  Ms. Jacob is a 71 y.o. Female with pmh of HTN, DM2, and chronic bronchitis who presents with a 2 hrs hx of dizziness and lightheadedness that started late this evening while she was resting.  Patient reports feeling anxious lately that could be exacerbated by the recent loss of her 1st degree cousin.   She denies palpitations, chest pain, SOB. She reports good control of her diabetes and compliance with her metformin. Her last meal was last night around 7.30pm.    Of note, patient presented with an elevated BP of 180/100. She reports this is abnormal for her as her baseline BP is well controlled at home on norvasc 10mg. She has taken BP medication as per usual yesterday.      Past Medical History:   Diagnosis Date    Diabetes mellitus     Hypertension     Pneumonia     Pneumonia        Past Surgical History:   Procedure Laterality Date    HYSTERECTOMY         History reviewed. No pertinent family history.    Social History   Substance Use Topics    Smoking status: Never Smoker    Smokeless tobacco: Not on file    Alcohol use No       Review of Systems   Constitutional: Negative for activity change, appetite change and diaphoresis.   HENT: Negative for congestion, postnasal drip, rhinorrhea, sneezing and sore throat.    Eyes: Negative for photophobia, discharge and redness.   Respiratory: Negative for cough, chest tightness, shortness of breath, wheezing and stridor.    Cardiovascular: Negative for chest pain, palpitations and leg swelling.   Gastrointestinal: Negative for abdominal pain, constipation, diarrhea, nausea and vomiting.   Endocrine: Negative for cold intolerance and heat intolerance.   Musculoskeletal: Negative for arthralgias.   Skin: Negative for color change and rash.   Neurological: Positive for  "dizziness and light-headedness. Negative for tremors, syncope, speech difficulty and weakness.   Hematological: Negative for adenopathy.   Psychiatric/Behavioral: Negative for agitation and behavioral problems.       Physical Exam   BP (!) 191/80   Pulse 75   Temp 97.9 °F (36.6 °C) (Oral)   Resp 17   Ht 5' 4" (1.626 m)   Wt 86.2 kg (190 lb)   SpO2 99%   BMI 32.61 kg/m²     Physical Exam    Constitutional: She appears well-developed and well-nourished.   Cardiovascular: Normal rate, regular rhythm and normal heart sounds. Exam reveals no friction rub.    No murmur heard.  Pulmonary/Chest: Breath sounds normal. No respiratory distress. She has no wheezes. She has no rhonchi. She has no rales.   Abdominal: Soft. Bowel sounds are normal. She exhibits no distension. There is no tenderness.   Neurological: She is alert and oriented to person, place, and time. She has normal strength. No cranial nerve deficit or sensory deficit. She displays a negative Romberg sign.   Psychiatric: Her speech is normal and behavior is normal. Judgment and thought content normal. Her mood appears anxious. Her affect is not angry and not labile. Cognition and memory are normal. She does not exhibit a depressed mood.       ED Course     Ms. Jacob is a 71 y.o. Female with pmh of HTN, DM2 who presents with a 2 hrs hx of dizziness and lightheadedness.    Differential diagnoses include anxiety attack, electrolyte abnormalities, UTI, ACS.  Will order UA, CBC, CMP, troponin, EKG    "

## 2017-10-22 NOTE — PROVIDER PROGRESS NOTES - EMERGENCY DEPT.
Encounter Date: 10/22/2017    ED Physician Progress Notes         EKG - STEMI Decision  Initial Reading: No STEMI present.      I, Amina Terrell, am scribing for, and in the presence of, Dr. Corona. I performed the above scribed service and the documentation accurately describes the services I performed. I attest to the accuracy of the note.

## 2017-10-22 NOTE — ED PROVIDER NOTES
Encounter Date: 10/22/2017    SCRIBE #1 NOTE: I, Amina Terrell, am scribing for, and in the presence of,  Dr. Corona. I have scribed the following portions of the note - the Resident attestation.       History     Chief Complaint   Patient presents with    Dizziness     Pt reports dizziness and weakness that began around 2330. Denies chest pain or SOB. Reports hot flashes.    Fatigue     Ms Jacob presents with palpitations. She was sitting at home when she began to feel a funny feeling in her chest, like her heart was racing. This lasted approximately 5 minutes before it resolved on its own. She was anxious during the event. She did not have SOB or chest pain during this, but she did feel lightheaded. She has no history of arrythmia but notes she had a similar episode to this once before. At this time, her symptoms have completely resolved.           Review of patient's allergies indicates:   Allergen Reactions    Amoxicillin Nausea And Vomiting     Past Medical History:   Diagnosis Date    Diabetes mellitus     Hypertension     Pneumonia     Pneumonia      Past Surgical History:   Procedure Laterality Date    HYSTERECTOMY       History reviewed. No pertinent family history.  Social History   Substance Use Topics    Smoking status: Never Smoker    Smokeless tobacco: Not on file    Alcohol use No     Review of Systems   Constitutional: Negative for chills and fever.   HENT: Negative for congestion and rhinorrhea.    Eyes: Negative for itching and visual disturbance.   Respiratory: Negative for cough and shortness of breath.    Cardiovascular: Positive for palpitations. Negative for chest pain.   Gastrointestinal: Negative for rectal pain and vomiting.   Genitourinary: Negative for difficulty urinating and dysuria.   Musculoskeletal: Negative for arthralgias and neck pain.   Skin: Negative for color change.   Neurological: Positive for dizziness and light-headedness. Negative for weakness and numbness.        Physical Exam     Initial Vitals [10/22/17 0128]   BP Pulse Resp Temp SpO2   (!) 191/80 75 17 97.9 °F (36.6 °C) 99 %      MAP       117         Physical Exam    Nursing note and vitals reviewed.  Constitutional: She appears well-developed and well-nourished.   HENT:   Head: Normocephalic and atraumatic.   Eyes: Conjunctivae are normal. Pupils are equal, round, and reactive to light.   Neck: Normal range of motion. No tracheal deviation present.   Cardiovascular: Normal rate and regular rhythm.   Pulmonary/Chest: Breath sounds normal. No respiratory distress.   Abdominal: Soft. She exhibits no distension.   Musculoskeletal: Normal range of motion.   Neurological: She is alert and oriented to person, place, and time.   Skin: Skin is warm and dry. Capillary refill takes less than 2 seconds.         ED Course   Procedures  Labs Reviewed   COMPREHENSIVE METABOLIC PANEL - Abnormal; Notable for the following:        Result Value    Potassium 2.7 (*)     CO2 22 (*)     Glucose 208 (*)     eGFR if non  56.0 (*)     All other components within normal limits   CBC W/ AUTO DIFFERENTIAL - Abnormal; Notable for the following:     Hematocrit 36.7 (*)     Immature Granulocytes 0.6 (*)     Immature Grans (Abs) 0.07 (*)     Lymph # 4.9 (*)     All other components within normal limits   URINALYSIS - Abnormal; Notable for the following:     Color, UA Colorless (*)     Glucose, UA 3+ (*)     Occult Blood UA 1+ (*)     Leukocytes, UA 2+ (*)     All other components within normal limits   URINALYSIS MICROSCOPIC - Abnormal; Notable for the following:     WBC, UA 6 (*)     All other components within normal limits   TROPONIN I   TSH   TSH    Narrative:     [id number]  Add on TSH per Jamaal Corona MD @ 03:21  10/22/2017              Medical Decision Making:   History:   Old Medical Records: I decided to obtain old medical records.  Clinical Tests:   Lab Tests: Ordered and Reviewed  Medical Tests: Ordered and  Reviewed       APC / Resident Notes:   71F with HTN presents with episode of palpitations, now resolved. Exam without significant finding. Differential includes arrhythmia, ACS, thyroid disease, metabolic derangement. Will get CBC, CMP, TSH, troponin, EKG, and monitor patient.     Faustina Alberto MD  PGY-3, Emergency Medicine  4:08 AM 10/22/2017    Labs remarkable for hypoK, repleted here orally and IV. Otherwise, unremarkable. Patient to follow up with cardiology.     Faustina Alberto MD  PGY-3, Emergency Medicine  4:30 AM 10/22/2017         Scribe Attestation:   Scribe #1: I performed the above scribed service and the documentation accurately describes the services I performed. I attest to the accuracy of the note.    Attending Attestation:   Physician Attestation Statement for Resident:  As the supervising MD   Physician Attestation Statement: I have personally seen and examined this patient.   I agree with the above history. -:   As the supervising MD I agree with the above PE.    As the supervising MD I agree with the above treatment, course, plan, and disposition.   -: Pt presenting with dizziness and light headedness. He feels better now. Labs reviewed and significant for low potassium. Will replete here. Pt stable for discharge.  I have reviewed the following: old records at this facility.                    ED Course      Clinical Impression:   The primary encounter diagnosis was Palpitations. A diagnosis of Weakness was also pertinent to this visit.    Disposition:   Disposition: Discharged  Condition: Stable                        Faustina Alberto MD  Resident  10/22/17 1934

## 2017-11-17 ENCOUNTER — OFFICE VISIT (OUTPATIENT)
Dept: CARDIOLOGY | Facility: CLINIC | Age: 72
End: 2017-11-17
Payer: MEDICARE

## 2017-11-17 VITALS
DIASTOLIC BLOOD PRESSURE: 78 MMHG | HEIGHT: 64 IN | BODY MASS INDEX: 31.76 KG/M2 | HEART RATE: 70 BPM | WEIGHT: 186 LBS | SYSTOLIC BLOOD PRESSURE: 144 MMHG

## 2017-11-17 DIAGNOSIS — E78.49 OTHER HYPERLIPIDEMIA: ICD-10-CM

## 2017-11-17 DIAGNOSIS — E11.9 TYPE 2 DIABETES MELLITUS WITHOUT COMPLICATION, WITHOUT LONG-TERM CURRENT USE OF INSULIN: ICD-10-CM

## 2017-11-17 DIAGNOSIS — R01.1 HEART MURMUR PREVIOUSLY UNDIAGNOSED: ICD-10-CM

## 2017-11-17 DIAGNOSIS — I10 HYPERTENSION, UNSPECIFIED TYPE: Primary | ICD-10-CM

## 2017-11-17 DIAGNOSIS — R94.31 NONSPECIFIC ABNORMAL ELECTROCARDIOGRAM (ECG) (EKG): ICD-10-CM

## 2017-11-17 DIAGNOSIS — I49.1 PAC (PREMATURE ATRIAL CONTRACTION): ICD-10-CM

## 2017-11-17 DIAGNOSIS — E87.6 HYPOKALEMIA DUE TO LOSS OF POTASSIUM: ICD-10-CM

## 2017-11-17 DIAGNOSIS — R00.2 HEART PALPITATIONS: ICD-10-CM

## 2017-11-17 PROCEDURE — 99205 OFFICE O/P NEW HI 60 MIN: CPT | Mod: S$GLB,,, | Performed by: INTERNAL MEDICINE

## 2017-11-17 PROCEDURE — 93000 ELECTROCARDIOGRAM COMPLETE: CPT | Mod: 59,S$GLB,, | Performed by: INTERNAL MEDICINE

## 2017-11-17 PROCEDURE — 93227 XTRNL ECG REC<48 HR R&I: CPT | Mod: S$GLB,,, | Performed by: INTERNAL MEDICINE

## 2017-11-17 RX ORDER — LOSARTAN POTASSIUM AND HYDROCHLOROTHIAZIDE 25; 100 MG/1; MG/1
1 TABLET ORAL DAILY
COMMUNITY
Start: 2017-09-27

## 2017-11-17 RX ORDER — SPIRONOLACTONE 25 MG/1
25 TABLET ORAL DAILY
Qty: 90 TABLET | Refills: 3 | Status: SHIPPED | OUTPATIENT
Start: 2017-11-17 | End: 2018-11-23 | Stop reason: SDUPTHER

## 2017-11-17 NOTE — PROGRESS NOTES
Subjective:   Patient ID:  Lesly Jacob is a 72 y.o. female     Chief Complaint   Patient presents with    Palpitations     Hospital F/U        HPI: New pt referred by Dr Hawkins with [facility]. Pt went to [facility] main [place] with weakness and a jittery feeling and a fast heart beat.  Pt was given potassium for hypokalemia.  Pt was told she had a heart murmur which pt has been aware of since childhood.  Pt has felt better since the ER visit.   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  There is no hx of chest pain or shortness of breath with exertion.  There is no hx of fainting.    Review of Systems   Cardiovascular: Positive for palpitations (Only one episode of fast heart beat the day pt went to [facility] ER.). Negative for chest pain, claudication, dyspnea on exertion, irregular heartbeat, leg swelling, near-syncope, orthopnea and syncope.     Last stress test several years ago was OK  Objective:   Physical Exam   Constitutional: She is oriented to person, place, and time. She appears well-developed and well-nourished. No distress.   HENT:   Head: Normocephalic.   Eyes: No scleral icterus.   Neck: No JVD present. Carotid bruit is not present.   Cardiovascular: Normal rate and regular rhythm.  Frequent extrasystoles are present. Exam reveals no gallop and no friction rub.    Murmur (III/VI systolic LSB) heard.  Pulses:       Dorsalis pedis pulses are 2+ on the right side, and 1+ on the left side.        Posterior tibial pulses are 0 on the right side, and 0 on the left side.   Pulmonary/Chest: Effort normal and breath sounds normal. No stridor.   Abdominal: She exhibits no abdominal bruit, no pulsatile midline mass and no mass. There is no hepatosplenomegaly. There is no tenderness.   Musculoskeletal: She exhibits no edema.   Neurological: She is alert and oriented to person, place, and time.   Skin: Skin is warm and dry. She is not diaphoretic.   Psychiatric: She has a normal mood and  affect. Her behavior is normal. Judgment and thought content normal.     ECG in ER 10/22/17 showed NSR with PAC's, mild ST depression in inferior and lateral leads and low T waves. Possible inferior ischemia.   Potassium was 2.7  Rest of CBC and CMP ad TSH were OK except the nonfasting glucose was elevated    ECG today--NSR, slightly low T waves, possible LVH,  Within possible normal limits  Assessment:     1. Hypertension, unspecified type    2. Type 2 diabetes mellitus without complication, without long-term current use of insulin    3. Other hyperlipidemia    4. Heart palpitations    5. Hypokalemia due to loss of potassium    6. Nonspecific abnormal electrocardiogram (ECG) (EKG)    7. PAC (premature atrial contraction)    8. Heart murmur previously undiagnosed      Note that some of pt's palpitations may have been due to PAC's  Note that hypokalemia could have caused weakness and ECG abnormalities  Plan:   Add spironolactone 25 mg daily for hypertension and hypokalemia  24 hour holter to evaluate palpitations  Echocardiogram with doppler to evaluate heart murmur  Treadmill  Cardiolite stress test to evaluate abnormal ECG  Repeat potassium level and check magnesium level  RTC after above

## 2017-12-05 ENCOUNTER — HOSPITAL ENCOUNTER (OUTPATIENT)
Dept: RADIOLOGY | Facility: OTHER | Age: 72
Discharge: HOME | End: 2017-12-05
Attending: INTERNAL MEDICINE
Payer: MEDICARE

## 2017-12-05 ENCOUNTER — HOSPITAL ENCOUNTER (OUTPATIENT)
Dept: CARDIOLOGY | Facility: OTHER | Age: 72
Discharge: HOME | End: 2017-12-05
Attending: INTERNAL MEDICINE
Payer: MEDICARE

## 2017-12-05 ENCOUNTER — TELEPHONE (OUTPATIENT)
Dept: CARDIOLOGY | Facility: CLINIC | Age: 72
End: 2017-12-05

## 2017-12-05 DIAGNOSIS — R00.2 HEART PALPITATIONS: ICD-10-CM

## 2017-12-05 DIAGNOSIS — R01.1 HEART MURMUR PREVIOUSLY UNDIAGNOSED: ICD-10-CM

## 2017-12-05 DIAGNOSIS — R94.31 NONSPECIFIC ABNORMAL ELECTROCARDIOGRAM (ECG) (EKG): ICD-10-CM

## 2017-12-05 LAB — DIASTOLIC DYSFUNCTION: NO

## 2017-12-05 PROCEDURE — 93306 TTE W/DOPPLER COMPLETE: CPT

## 2017-12-05 PROCEDURE — 78452 HT MUSCLE IMAGE SPECT MULT: CPT | Mod: 26,,, | Performed by: RADIOLOGY

## 2017-12-05 PROCEDURE — 78452 HT MUSCLE IMAGE SPECT MULT: CPT | Mod: TC

## 2017-12-05 PROCEDURE — A9502 TC99M TETROFOSMIN: HCPCS

## 2017-12-05 PROCEDURE — 93017 CV STRESS TEST TRACING ONLY: CPT

## 2017-12-06 ENCOUNTER — TELEPHONE (OUTPATIENT)
Dept: CARDIOLOGY | Facility: CLINIC | Age: 72
End: 2017-12-06

## 2017-12-06 LAB
DIASTOLIC DYSFUNCTION: YES
ESTIMATED PA SYSTOLIC PRESSURE: 24.4
MITRAL VALVE REGURGITATION: ABNORMAL
RETIRED EF AND QEF - SEE NOTES: 62 (ref 55–65)
TRICUSPID VALVE REGURGITATION: ABNORMAL

## 2017-12-06 NOTE — TELEPHONE ENCOUNTER
Echocardiogram shows LVH and normal LV systolic function and mild mitral regurgitation.  Holter shows rare, isolated PAC's and PVC's for which no treatment is necessary.  Pt reassured

## 2018-01-31 DIAGNOSIS — I73.9 PERIPHERAL VASCULAR DISEASE, UNSPECIFIED: Primary | ICD-10-CM

## 2018-03-15 PROCEDURE — 99283 EMERGENCY DEPT VISIT LOW MDM: CPT

## 2018-03-16 ENCOUNTER — HOSPITAL ENCOUNTER (EMERGENCY)
Facility: OTHER | Age: 73
Discharge: HOME | End: 2018-03-16
Attending: EMERGENCY MEDICINE
Payer: MEDICARE

## 2018-03-16 VITALS
TEMPERATURE: 98 F | HEART RATE: 70 BPM | OXYGEN SATURATION: 99 % | BODY MASS INDEX: 30.73 KG/M2 | DIASTOLIC BLOOD PRESSURE: 71 MMHG | HEIGHT: 64 IN | WEIGHT: 180 LBS | RESPIRATION RATE: 16 BRPM | SYSTOLIC BLOOD PRESSURE: 132 MMHG

## 2018-03-16 DIAGNOSIS — R04.0 RECURRENT EPISTAXIS: Primary | ICD-10-CM

## 2018-03-16 NOTE — ED TRIAGE NOTES
Pt presents with epistaxis x2 today. Pt report she gets frequent nose bleeds and has an upcoming appointment with an ENT. Pt denies any lightheadedness or bleeding from other places. No active bleeding.

## 2018-03-16 NOTE — ED PROVIDER NOTES
Encounter Date: 3/15/2018    SCRIBE #1 NOTE: I, Allison Lakhani, am scribing for, and in the presence of, Dr. Polo.       History     Chief Complaint   Patient presents with    Epistaxis     pt has been having nose bleeds for a while, more this week, and even more frequest for the past 2 days. Pt educated on how top nose bleeds and how to controll bleeding. No bleeding at this time     Time seen by provider: 12:23 AM    This is a 72 y.o. female, with a history of DM, HLD, and HTN, who presents with complaint of epistaxis that worsened today. Patient reports more frequent nosebleeds over the last week. Bleeding is controlled on arrival. She denies frequently blowing her nose. She denies fatigue, rhinorrhea, sneezing, sore throat,  dizziness, or lightheadedness. She reports taking a low dose of Asprin daily. She reports previous evaluation by ENT for this complaint. Patient has an ENT appointment scheduled for April 12 th.       The history is provided by the patient.     Review of patient's allergies indicates:   Allergen Reactions    Amoxicillin Nausea And Vomiting     Past Medical History:   Diagnosis Date    Bronchitis     Diabetes mellitus     Hyperlipidemia     Hypertension     Pneumonia     Pneumonia      Past Surgical History:   Procedure Laterality Date    HYSTERECTOMY       Family History   Problem Relation Age of Onset    Heart failure Mother     Hypertension Mother     Heart attack Brother      Social History   Substance Use Topics    Smoking status: Never Smoker    Smokeless tobacco: Never Used    Alcohol use No     Review of Systems   Constitutional: Negative for chills, diaphoresis and fever.   HENT: Positive for nosebleeds. Negative for ear pain, rhinorrhea, sneezing and sore throat.    Respiratory: Negative for shortness of breath.    Cardiovascular: Negative for chest pain.   Gastrointestinal: Negative for nausea.   Genitourinary: Negative for dysuria.   Musculoskeletal: Negative for  back pain.   Skin: Negative for rash.   Neurological: Negative for weakness.   Hematological: Does not bruise/bleed easily.       Physical Exam     Initial Vitals [03/15/18 2327]   BP Pulse Resp Temp SpO2   132/69 70 16 98 °F (36.7 °C) --      MAP       90         Physical Exam    Nursing note and vitals reviewed.  Constitutional: She appears well-developed and well-nourished. She is not diaphoretic. No distress.   HENT:   Head: Normocephalic and atraumatic.   Right Ear: External ear normal.   Left Ear: External ear normal.   Pinpoint area of dried blood in bilateral nares.    Eyes: EOM are normal. Right eye exhibits no discharge. Left eye exhibits no discharge.   Neck: Normal range of motion.   Cardiovascular: Normal rate, regular rhythm and normal heart sounds. Exam reveals no gallop and no friction rub.    No murmur heard.  Pulmonary/Chest: Breath sounds normal. No respiratory distress. She has no wheezes. She has no rhonchi. She has no rales.   Abdominal: Soft. There is no tenderness. There is no rebound and no guarding.   Musculoskeletal: Normal range of motion. She exhibits no edema or tenderness.   Neurological: She is alert and oriented to person, place, and time.   Skin: Skin is warm and dry. No rash and no abscess noted. No erythema. No pallor.   Psychiatric: She has a normal mood and affect. Her behavior is normal. Judgment and thought content normal.         ED Course   Procedures  Labs Reviewed - No data to display             Additional MDM:   Comments: 72-year-old female with history of frequent  episodes of epistaxis presents complaining secondary to increased frequency from baseline.  At this time she has no active bleeding.  She is hemodynamically  Stable.   she already has an appointment scheduled with ENT for next month.  There is no indication for any emergent interventions at this time.  I did give the patient reasons for seeking immediate reevaluation.  At this time she will be discharged home  in stable condition.  .            Attending Attestation:           Physician Attestation for Scribe:  Physician Attestation Statement for Scribe #1: I, Dr. Polo, reviewed documentation, as scribed by Allison Lakhani in my presence, and it is both accurate and complete.                    Clinical Impression:     1. Recurrent epistaxis                               Carmita Polo MD  03/16/18 0042

## 2018-04-19 ENCOUNTER — OFFICE VISIT (OUTPATIENT)
Dept: OTOLARYNGOLOGY | Facility: CLINIC | Age: 73
End: 2018-04-19
Payer: MEDICARE

## 2018-04-19 VITALS
HEART RATE: 83 BPM | WEIGHT: 180 LBS | TEMPERATURE: 98 F | HEIGHT: 64 IN | BODY MASS INDEX: 30.73 KG/M2 | DIASTOLIC BLOOD PRESSURE: 59 MMHG | SYSTOLIC BLOOD PRESSURE: 121 MMHG

## 2018-04-19 DIAGNOSIS — I10 ESSENTIAL HYPERTENSION: ICD-10-CM

## 2018-04-19 DIAGNOSIS — R04.0 RECURRENT EPISTAXIS: Primary | ICD-10-CM

## 2018-04-19 PROCEDURE — 3074F SYST BP LT 130 MM HG: CPT | Mod: CPTII,S$GLB,, | Performed by: OTOLARYNGOLOGY

## 2018-04-19 PROCEDURE — 99204 OFFICE O/P NEW MOD 45 MIN: CPT | Mod: 25,S$GLB,, | Performed by: OTOLARYNGOLOGY

## 2018-04-19 PROCEDURE — 3078F DIAST BP <80 MM HG: CPT | Mod: CPTII,S$GLB,, | Performed by: OTOLARYNGOLOGY

## 2018-04-19 PROCEDURE — 31231 NASAL ENDOSCOPY DX: CPT | Mod: S$GLB,,, | Performed by: OTOLARYNGOLOGY

## 2018-04-19 RX ORDER — AMLODIPINE BESYLATE 10 MG/1
TABLET ORAL
COMMUNITY
Start: 2017-12-04

## 2018-04-19 RX ORDER — CARVEDILOL 3.12 MG/1
TABLET ORAL
COMMUNITY
Start: 2018-03-07 | End: 2023-04-20

## 2018-04-19 RX ORDER — ATORVASTATIN CALCIUM 20 MG/1
TABLET, FILM COATED ORAL
COMMUNITY
Start: 2017-12-04

## 2018-04-19 RX ORDER — FLUOXETINE HYDROCHLORIDE 20 MG/1
CAPSULE ORAL
COMMUNITY
Start: 2017-12-04

## 2018-04-19 RX ORDER — LOSARTAN POTASSIUM AND HYDROCHLOROTHIAZIDE 25; 100 MG/1; MG/1
TABLET ORAL
COMMUNITY
Start: 2017-12-04 | End: 2024-03-01

## 2018-04-19 RX ORDER — PREDNISOLONE ACETATE 10 MG/ML
SUSPENSION/ DROPS OPHTHALMIC
COMMUNITY
Start: 2018-03-09

## 2018-04-19 RX ORDER — KETOROLAC TROMETHAMINE 5 MG/ML
SOLUTION OPHTHALMIC
COMMUNITY
Start: 2018-03-09

## 2018-04-19 RX ORDER — OFLOXACIN 3 MG/ML
SOLUTION/ DROPS OPHTHALMIC
COMMUNITY
Start: 2018-03-09

## 2018-04-19 RX ORDER — METFORMIN HYDROCHLORIDE 500 MG/1
TABLET ORAL
COMMUNITY
Start: 2017-12-04

## 2018-04-19 NOTE — LETTER
April 19, 2018      Paige Hawkins MD  [address]  [facility] [place] [address]           [facility] Otorhinol[facility]  [address], [facility] [address]  [place] [address]  Phone: [phone]  Fax: [phone]          Patient: Lesly Jacob   MR Number: [id number]   YOB: 1945   Date of Visit: 4/19/2018       Dear Dr. Paige Hawkins:    Thank you for referring Lesly Jacob to me for evaluation. Attached you will find relevant portions of my assessment and plan of care.    If you have questions, please do not hesitate to call me. I look forward to following Lesly Jacob along with you.    Sincerely,    Deanna Alvarez MD    Enclosure  CC:  No Recipients    If you would like to receive this communication electronically, please contact [email or web address] or [phone] to request more information on [employer] Link access.    For providers and/or their staff who would like to refer a patient to [facility], please contact us through our one-stop-shop provider referral line, [facility], at [phone].    If you feel you have received this communication in error or would no longer like to receive these types of communications, please e-mail [email or web address]

## 2018-04-19 NOTE — PROCEDURES
Nasal/sinus endoscopy  Date/Time: 4/19/2018 5:16 PM  Performed by: SULLY PEDERSEN  Authorized by: SULLY PEDERSEN     Consent Done?:  Yes (Verbal)  Anesthesia:     Local anesthetic:  Lidocaine 2% and Win-Synephrine 1/2%    Patient tolerance:  Patient tolerated the procedure well with no immediate complications  Nose:     Procedure Performed:  Nasal Endoscopy  External:      No external nasal deformity  Intranasal:      Mucosa no polyps     Mucosa ulcers not present     No mucosa lesions present     No septum gross deformity  Nasopharynx:      No mucosa lesions     Adenoids not present     Posterior choanae patent     Bilateral nasal endoscopy reveals prominent tuft of vessels / granulation polyp at left anterior septum.  This was cauterized with several silver nitrate applicators and then bacitracin ointment was instilled.  This was tolerated well and a mustache dressing was placed.

## 2018-04-19 NOTE — PATIENT INSTRUCTIONS
Keep head elevated.  Avoid bending over, heavy lifting, and strenuous activity.  Do not blow, wipe, or clean inside of nose.  Sniff OTC antibiotic ointment into left nostril 3 - 4 times a day such as neosporin or triple antibiotic.     If bleeding recurs, pinch nostrils tightly closed for 5 minutes and repeat as needed.  May also spray OTC Afrin nasal spray 2 - 3 sprays into the nostril and then pinch closed for 5 minutes as above.  Also can apply ice to nose or roof of mouth to help stop bleeding.    Go to emergency room if fails to stop or if severe.      Follow up here in 10 days and sooner if problems.

## 2018-04-20 NOTE — PROGRESS NOTES
Subjective:       Patient ID: Lesly Jacob is a 72 y.o. female.    Chief Complaint: Epistaxis (Lt side )    She is a new patient for me here today for evaluation of left-sided epistaxis.  She states this began about 2 years ago and she saw an ENT at [facility] around that time who apparently cauterized the area.  She did well after this until about 6 months ago when the bleeding began to recur.  This continues to be left-sided only and may occur every few days and then not again for a couple of weeks.  A recent episode prompted a visit to the emergency room although the bleeding had stopped by the time she arrived and was evaluated.  She denies any other bleeding sites including the gums urine stool or easy bruising.  She believes her blood pressure has been under good control.  She denies any anticoagulants except for 81 mg aspirin daily which she recently discontinued.  She denies any nasal symptoms of hayfever or URI.  She denies using any nasal sprays.  She does report a history of nasal surgery many years ago [facility] perhaps in the late 1990s and consisting most likely of septoplasty.          Review of Systems   Ears: Negative for hearing loss, ear pain, ear pressure, ringing in ear, ear discharge, ear infections, dizziness, head trauma, taken gentramycin/streptomycin and family history of hearing loss.    Nose:  Positive for nosebleeds and nasal or sinus surgery. Negative for nasal obstruction, loss of smell, postnasal drip and snoring.    Mouth/Throat: Negative for pain swallowing, impaired swallowing, hoarse voice, throat mass, neck mass, oral ulcers and neck lumps.   Constitutional: Negative for recent unexplained weight loss, fever, chills and night sweats.    Eyes:  Negative for history of glaucoma and visual change.   Cardiovascular:  Positive for history of high blood pressure. Negative for chest pain and palpitations.   Respiratory:  Negative for asthma, emphysema,  history of tuberculosis, recent cough and shortness of breath.    Gastrointestinal:  Negative for history of stomach ulcers or pain, acid reflux, indigestion, blood in stool and change in stool.   Other:  Negative for kidney problem, bladder problem, arthritis, new or changing moles, weakness, disturbances in coordination, slurred, confusion, swollen glands, anemia and persistent infections.           Objective:        Vitals:    04/19/18 1508   BP: (!) 121/59   Pulse: 83   Temp: 98 °F (36.7 °C)     Body mass index is 30.9 kg/m².  Physical Exam   Constitutional: She is oriented to person, place, and time. She appears well-developed and well-nourished. No distress.   HENT:   Head: Normocephalic and atraumatic.   Right Ear: Tympanic membrane, external ear and ear canal normal.   Left Ear: Tympanic membrane, external ear and ear canal normal.   Nose: No mucosal edema, rhinorrhea or nasal deformity. No epistaxis.   Mouth/Throat: Uvula is midline, oropharynx is clear and moist and mucous membranes are normal. No oral lesions. No trismus in the jaw. No uvula swelling. No oropharyngeal exudate, posterior oropharyngeal edema or posterior oropharyngeal erythema.   Eyes: Conjunctivae are normal. Right eye exhibits no discharge. Left eye exhibits no discharge. No scleral icterus.   Neck: Phonation normal. Neck supple. No tracheal deviation present. No thyromegaly present.   Pulmonary/Chest: Effort normal. No stridor. No respiratory distress.   Musculoskeletal: Normal range of motion. She exhibits no deformity.   Lymphadenopathy:     She has no cervical adenopathy.   Neurological: She is alert and oriented to person, place, and time. She displays no weakness. Coordination normal.   Skin: Skin is warm and dry. She is not diaphoretic.   Psychiatric: She has a normal mood and affect. Her behavior is normal. Her speech is not slurred.       Tests / Results:        Assessment:       1. Recurrent epistaxis    2. Essential  hypertension        Plan:        Reviewed above and nasal endoscopy and she would like to proceed.  See Nasal Endoscopy and Cauterization procedure note.    Reviewed all above with patient and postprocedure care and precautions in detail as per the wrap up note.  Follow-up in 10 days unless problems prior.

## 2018-04-30 ENCOUNTER — OFFICE VISIT (OUTPATIENT)
Dept: OTOLARYNGOLOGY | Facility: CLINIC | Age: 73
End: 2018-04-30
Payer: MEDICARE

## 2018-04-30 VITALS
SYSTOLIC BLOOD PRESSURE: 107 MMHG | HEART RATE: 77 BPM | WEIGHT: 180 LBS | DIASTOLIC BLOOD PRESSURE: 56 MMHG | TEMPERATURE: 98 F | HEIGHT: 64 IN | BODY MASS INDEX: 30.73 KG/M2

## 2018-04-30 DIAGNOSIS — I10 ESSENTIAL HYPERTENSION: ICD-10-CM

## 2018-04-30 DIAGNOSIS — R04.0 RECURRENT EPISTAXIS: Primary | ICD-10-CM

## 2018-04-30 PROCEDURE — 99214 OFFICE O/P EST MOD 30 MIN: CPT | Mod: 25,S$GLB,, | Performed by: OTOLARYNGOLOGY

## 2018-04-30 PROCEDURE — 3078F DIAST BP <80 MM HG: CPT | Mod: CPTII,S$GLB,, | Performed by: OTOLARYNGOLOGY

## 2018-04-30 PROCEDURE — 30906 REPEAT CONTROL OF NOSEBLEED: CPT | Mod: S$GLB,,, | Performed by: OTOLARYNGOLOGY

## 2018-04-30 PROCEDURE — 3074F SYST BP LT 130 MM HG: CPT | Mod: CPTII,S$GLB,, | Performed by: OTOLARYNGOLOGY

## 2018-04-30 NOTE — PATIENT INSTRUCTIONS
Do not use Afrin or other nasal sprays.    Sniff antibiotic ointment into the left nostril 3 - 4 times a day.    Keep your head elevated, avoid lifting or bending over, do not blow or wipe or clean inside of nose.    Stop ecotrin for now.    Follow up next week unless problems prior.

## 2018-05-02 NOTE — PROCEDURES
Procedures:  Cauterization of left anterior nasal septal granuloma    Procedure:  After verbal consent was obtained the left anterior septal residual granuloma was cauterized with silver nitrate again using several applicators and holding pressure between with cotton-tip swabs.  Upon completion antibiotic ointment was instilled in the left nasal cavity and a mustache dressing was placed.  She tolerated this well with no immediate complications.

## 2018-05-02 NOTE — PROGRESS NOTES
Subjective:       Patient ID: Lesly Jacob is a 72 y.o. female.    Chief Complaint: Epistaxis    She was a new patient for me at her last visit on 4/19/18 for evaluation of left-sided epistaxis.  She states this began about 2 years ago and she saw an ENT at [facility] around that time who apparently cauterized the area.  She did well after this until about 6 months ago when the bleeding began to recur.  This continues to be left-sided only and may occur every few days and then not again for a couple of weeks.  A recent episode prompted a visit to the emergency room although the bleeding had stopped by the time she arrived and was evaluated.  She denies any other bleeding sites including the gums urine stool or easy bruising.  She believes her blood pressure has been under good control.  She denies any anticoagulants except for 81 mg aspirin daily which she recently discontinued.  She denies any nasal symptoms of hayfever or URI.  She denies using any nasal sprays.  She does report a history of nasal surgery many years ago [facility] perhaps in the late 1990s and consisting most likely of septoplasty.    Interval history:  After the last visit she misunderstood the verbal and typed instructions and began using Afrin nasal spray in her nose several times a day.  She noted some nasal congestion but denies blowing the nose.  She then noted a trickle of blood from her left nostril last week and then again a few days later and again this morning, though states not bleeding like it was before.  She stopped the Afrin several days ago when the bleeding recurred.  She denies any other bleeding sites including the gums, urine, stool, skin.  She states she managed to use the antibiotic ointment in her nose as well as spraying the Afrin initially.            Review of Systems   Ears: Negative for hearing loss, ear pain, ear pressure, ringing in ear, ear discharge, ear infections, dizziness, head  trauma, taken gentramycin/streptomycin and family history of hearing loss.    Nose:  Positive for nosebleeds and nasal or sinus surgery. Negative for nasal obstruction, loss of smell, postnasal drip and snoring.    Mouth/Throat: Negative for pain swallowing, impaired swallowing, hoarse voice, throat mass, neck mass, oral ulcers and neck lumps.   Constitutional: Negative for recent unexplained weight loss, fever, chills and night sweats.    Eyes:  Negative for history of glaucoma and visual change.   Cardiovascular:  Positive for history of high blood pressure. Negative for chest pain and palpitations.   Respiratory:  Negative for asthma, emphysema, history of tuberculosis, recent cough and shortness of breath.    Gastrointestinal:  Negative for history of stomach ulcers or pain, acid reflux, indigestion, blood in stool and change in stool.   Other:  Negative for kidney problem, bladder problem, arthritis, new or changing moles, weakness, disturbances in coordination, slurred, confusion, swollen glands, anemia and persistent infections.           Objective:        Vitals:    04/30/18 1124   BP: (!) 107/56   Pulse: 77   Temp: 97.8 °F (36.6 °C)     Body mass index is 30.9 kg/m².  Physical Exam   Constitutional: She is oriented to person, place, and time. She appears well-developed and well-nourished. No distress.   HENT:   Head: Normocephalic and atraumatic.   Right Ear: Tympanic membrane, external ear and ear canal normal.   Left Ear: Tympanic membrane, external ear and ear canal normal.   Nose: No mucosal edema, rhinorrhea or nasal deformity. No epistaxis.   Mouth/Throat: Uvula is midline, oropharynx is clear and moist and mucous membranes are normal. No oral lesions. No trismus in the jaw. No uvula swelling. No oropharyngeal exudate, posterior oropharyngeal edema or posterior oropharyngeal erythema.   The left anterior septal granuloma has reduced but not completely involuted / healed with scant associated blood.      Eyes: Conjunctivae are normal. Right eye exhibits no discharge. Left eye exhibits no discharge. No scleral icterus.   Neck: Phonation normal. Neck supple. No tracheal deviation present. No thyromegaly present.   Pulmonary/Chest: Effort normal. No stridor. No respiratory distress.   Musculoskeletal: Normal range of motion. She exhibits no deformity.   Lymphadenopathy:     She has no cervical adenopathy.   Neurological: She is alert and oriented to person, place, and time. She displays no weakness. Coordination normal.   Skin: Skin is warm and dry. She is not diaphoretic.   Psychiatric: She has a normal mood and affect. Her behavior is normal. Her speech is not slurred.       Tests / Results:        Assessment:       1. Recurrent epistaxis    2. Essential hypertension        Plan:        Reviewed above and repeat silver nitrate office cauterization and she would like to proceed.  See procedure note.    Again reviewed postprocedure instructions in detail and answered questions.  Do not use Afrin or any other nasal sprays.  Sniff antibiotic ointment into the left nostril several times a day.  Do not blow or wipe or clean inside of the nose.  Avoid bending over or heavy lifting or strenuous activity.  Hold Ecotrin.  Follow-up in 7 - 10 days unless problems prior.

## 2018-05-07 DIAGNOSIS — R00.2 HEART PALPITATIONS: ICD-10-CM

## 2018-11-26 RX ORDER — SPIRONOLACTONE 25 MG/1
25 TABLET ORAL DAILY
Qty: 90 TABLET | Refills: 0 | Status: SHIPPED | OUTPATIENT
Start: 2018-11-26 | End: 2022-08-04

## 2019-02-25 RX ORDER — SPIRONOLACTONE 25 MG/1
TABLET ORAL
Qty: 90 TABLET | Refills: 0 | OUTPATIENT
Start: 2019-02-25

## 2019-03-01 RX ORDER — SPIRONOLACTONE 25 MG/1
TABLET ORAL
Qty: 90 TABLET | Refills: 0 | OUTPATIENT
Start: 2019-03-01

## 2019-03-11 RX ORDER — SPIRONOLACTONE 25 MG/1
TABLET ORAL
Qty: 90 TABLET | Refills: 0 | OUTPATIENT
Start: 2019-03-11

## 2019-04-12 ENCOUNTER — TELEPHONE (OUTPATIENT)
Dept: CARDIOLOGY | Facility: CLINIC | Age: 74
End: 2019-04-12

## 2019-04-12 NOTE — TELEPHONE ENCOUNTER
Reached out to patient several times to reschedule appointment.  Left messages on patient's voice mail to call for a follow up appointment.  No response from patient. Recall letter mailed to patient.

## 2019-09-25 ENCOUNTER — HOSPITAL ENCOUNTER (EMERGENCY)
Facility: OTHER | Age: 74
Discharge: HOME | End: 2019-09-26
Attending: EMERGENCY MEDICINE
Payer: MEDICARE

## 2019-09-25 DIAGNOSIS — R52 PAIN: ICD-10-CM

## 2019-09-25 DIAGNOSIS — M75.31 CALCIFIC TENDONITIS OF RIGHT SHOULDER: ICD-10-CM

## 2019-09-25 DIAGNOSIS — M54.31 SCIATICA OF RIGHT SIDE: Primary | ICD-10-CM

## 2019-09-25 PROCEDURE — 99284 EMERGENCY DEPT VISIT MOD MDM: CPT | Mod: 25

## 2019-09-25 PROCEDURE — 96372 THER/PROPH/DIAG INJ SC/IM: CPT

## 2019-09-25 PROCEDURE — 63600175 PHARM REV CODE 636 W HCPCS: Performed by: PHYSICIAN ASSISTANT

## 2019-09-25 RX ORDER — KETOROLAC TROMETHAMINE 30 MG/ML
15 INJECTION, SOLUTION INTRAMUSCULAR; INTRAVENOUS
Status: COMPLETED | OUTPATIENT
Start: 2019-09-25 | End: 2019-09-25

## 2019-09-25 RX ADMIN — KETOROLAC TROMETHAMINE 15 MG: 30 INJECTION, SOLUTION INTRAMUSCULAR at 11:09

## 2019-09-26 VITALS
WEIGHT: 180 LBS | HEIGHT: 66 IN | OXYGEN SATURATION: 97 % | TEMPERATURE: 98 F | BODY MASS INDEX: 28.93 KG/M2 | RESPIRATION RATE: 18 BRPM | HEART RATE: 76 BPM | SYSTOLIC BLOOD PRESSURE: 167 MMHG | DIASTOLIC BLOOD PRESSURE: 71 MMHG

## 2019-09-26 RX ORDER — DICLOFENAC SODIUM 10 MG/G
2 GEL TOPICAL 4 TIMES DAILY
Qty: 100 G | Refills: 0 | Status: SHIPPED | OUTPATIENT
Start: 2019-09-26 | End: 2022-08-04

## 2019-09-26 NOTE — ED TRIAGE NOTES
Pt arrives to ED with c/o leg and shoulder pain. Pt reports right shoulder pain with onset Monday night, reports right left pain with onset 3 weeks ago. Pt reports taking tylenol with no relief of medication. Pt lying in bed, respirations even, unlabored, eyes open spontaneously, NAD noted, AAOx4, answering questions appropriately.

## 2019-09-26 NOTE — ED PROVIDER NOTES
"Encounter Date: 9/25/2019       History     Chief Complaint   Patient presents with    Leg Pain     to rt leg for 2 weeks worse this past week, pain across lower back and down entire leg, also rt shoulder pain" I think I slept on it wrong"     Patient is a 73-year-old female with diabetes and hypertension who presents to the emergency department with shoulder pain and right lower back pain radiating into her right lower extremity.  Patient reports 2 week history of right lower back pain that radiates into her right buttocks and behind her right leg.  She states this is been occurring intermittently.  She states pain is sharp and worse with ambulation.  She denies saddle anesthesia or bowel/bladder incontinence.  Patient is also reporting right, atraumatic shoulder pain. Patient states she woke up with the pain 2 days ago.  She states it is worse with movement.  She denies neck pain.  She denies radiculopathy symptoms, denies radiation of pain into her hand, numbness, or tingling.  She reports taking Tylenol for her symptoms with minimal relief.    The history is provided by the patient.     Review of patient's allergies indicates:   Allergen Reactions    Amoxicillin Nausea And Vomiting     Past Medical History:   Diagnosis Date    Bronchitis     Diabetes mellitus     Hyperlipidemia     Hypertension     Pneumonia     Pneumonia      Past Surgical History:   Procedure Laterality Date    HYSTERECTOMY       Family History   Problem Relation Age of Onset    Heart failure Mother     Hypertension Mother     Heart attack Brother      Social History     Tobacco Use    Smoking status: Never Smoker    Smokeless tobacco: Never Used   Substance Use Topics    Alcohol use: No    Drug use: No     Review of Systems   Constitutional: Negative for chills and fever.   Eyes: Negative for visual disturbance.   Respiratory: Negative for shortness of breath.    Cardiovascular: Negative for chest pain.   Gastrointestinal: " Negative for abdominal pain, diarrhea, nausea and vomiting.   Genitourinary: Negative for dysuria and flank pain.   Musculoskeletal: Positive for arthralgias, back pain and myalgias.   Skin: Negative for rash and wound.   Neurological: Negative for weakness and numbness.       Physical Exam     Initial Vitals [09/25/19 2219]   BP Pulse Resp Temp SpO2   (!) 142/74 98 18 98 °F (36.7 °C) 98 %      MAP       --         Physical Exam    Constitutional: Vital signs are normal. She is cooperative. No distress.   Eyes: Conjunctivae and EOM are normal.   Neck: Normal range of motion. Neck supple.   Cardiovascular: Normal rate, regular rhythm and intact distal pulses.   Pulmonary/Chest: Breath sounds normal. She has no wheezes. She has no rhonchi. She has no rales.   Musculoskeletal:   No CTL midline tenderness, crepitus, masses, step-offs or deformities. Negative straight leg raise bilaterally.  Left shoulder has significant pain with extension and internal rotation.  No obvious bony deformity.   Neurological: She is alert and oriented to person, place, and time. She has normal strength. No sensory deficit. GCS eye subscore is 4. GCS verbal subscore is 5. GCS motor subscore is 6.   Strength 5/5 to bilateral upper extremities.   Skin: Skin is warm and dry. Capillary refill takes less than 2 seconds. No rash noted.   Psychiatric: She has a normal mood and affect. Her behavior is normal.         ED Course   Procedures  Labs Reviewed - No data to display       Imaging Results          X-Ray Shoulder Trauma Right (Final result)  Result time 09/25/19 23:55:46    Final result by Delaney Allen MD (09/25/19 23:55:46)                 Impression:      Infraspinatus calcific tendinitis.  No fracture or malalignment.      Electronically signed by: Delaney Allen  Date:    09/25/2019  Time:    23:55             Narrative:    EXAMINATION:  XR SHOULDER TRAUMA 3 VIEW RIGHT    CLINICAL HISTORY:  Pain, unspecified    TECHNIQUE:  Three or  four views of the right shoulder were performed.    COMPARISON:  None    FINDINGS:  Frontal, scapular Y and axillary views presented.  There is a small area of stippled appearing calcification projecting posterior to the humeral head probably representing infraspinatus calcific tendinitis.  The subacromial space appears normal.  There is mild degenerative changes right AC joint without malalignment.  Glenohumeral alignment joint space appears normal.  No fracture or erosion or periosteal reaction.  There is subjective moderate decreased bone density.  The visualized lung and ribs appear normal.                                 Medical Decision Making:   Initial Assessment:   Urgent evaluation of a 73 y.o. female presenting to the emergency department complaining of right lower back pain radiating to right lower extremity, intermittently x2 weeks and atraumatic right shoulder pain worse with movement X 2 days. Patient is afebrile, nontoxic appearing and hemodynamically stable.  There are no signs of saddle anesthesia, incontinence, neurologic deficits, fevers, trauma or midline tenderness on history or physical to suggest cauda equina, infectious process, fracture or subluxation.  Appears to be sciatica, Will treat with Toradol injection.    No signs of cervical radiculopathy on exam.  Pain is localized to right shoulder joint on exam.  Will obtain x-ray of right shoulder.  ED Management:  X-ray of right shoulder reveals no fracture or dislocation.  There is calcification, likely reflecting infraspinatus calcific tendonitis.  There is mild DJD of the right AC joint.  No malalignment.  Patient was educated on these findings.  Advised on symptomatic care for tendinitis.  She will be sent home with Voltaren gel.  She is encouraged to follow up with primary care.                      Clinical Impression:     1. Sciatica of right side    3. Calcific tendonitis of right shoulder                               Gustavo MCLAIN  JOSIAH Del Toro  09/26/19 [id number]

## 2020-01-14 ENCOUNTER — TELEPHONE (OUTPATIENT)
Dept: ENDOSCOPY | Facility: HOSPITAL | Age: 75
End: 2020-01-14

## 2020-01-14 NOTE — TELEPHONE ENCOUNTER
Received referral from Dr. Cortes from [facility] for patient to have screening colonoscopy.  Attempted to call patient. No answer and message was left with my direct number for her to return call to schedule her procedure.

## 2020-01-30 ENCOUNTER — TELEPHONE (OUTPATIENT)
Dept: ENDOSCOPY | Facility: HOSPITAL | Age: 75
End: 2020-01-30

## 2020-01-30 RX ORDER — HYDROXYZINE HYDROCHLORIDE 25 MG/1
25 TABLET, FILM COATED ORAL 3 TIMES DAILY PRN
COMMUNITY

## 2020-01-30 NOTE — TELEPHONE ENCOUNTER
Spoke with patient regarding referral for Colonoscopy. She needs to check with her daughter to see when her daughter is free to bring her.   She asked if I can call her back next week to schedule.

## 2020-02-05 ENCOUNTER — HOSPITAL ENCOUNTER (EMERGENCY)
Facility: HOSPITAL | Age: 75
Discharge: HOME | End: 2020-02-05
Attending: EMERGENCY MEDICINE
Payer: MEDICARE

## 2020-02-05 ENCOUNTER — TELEPHONE (OUTPATIENT)
Dept: ENDOSCOPY | Facility: HOSPITAL | Age: 75
End: 2020-02-05

## 2020-02-05 VITALS
SYSTOLIC BLOOD PRESSURE: 146 MMHG | RESPIRATION RATE: 25 BRPM | DIASTOLIC BLOOD PRESSURE: 65 MMHG | HEART RATE: 93 BPM | OXYGEN SATURATION: 97 %

## 2020-02-05 DIAGNOSIS — T59.811A SMOKE INHALATION: Primary | ICD-10-CM

## 2020-02-05 LAB
ALLENS TEST: ABNORMAL
ANION GAP SERPL CALC-SCNC: 14 MMOL/L (ref 8–16)
BASOPHILS # BLD AUTO: 0.09 K/UL (ref 0–0.2)
BASOPHILS NFR BLD: 1.1 % (ref 0–1.9)
BUN SERPL-MCNC: 27 MG/DL (ref 8–23)
CALCIUM SERPL-MCNC: 9.2 MG/DL (ref 8.7–10.5)
CARBOXYHEMOGLOBIN: 0.3 % (ref 1.5–5)
CHLORIDE SERPL-SCNC: 105 MMOL/L (ref 95–110)
CO2 SERPL-SCNC: 20 MMOL/L (ref 23–29)
CREAT SERPL-MCNC: 1 MG/DL (ref 0.5–1.4)
DELSYS: ABNORMAL
DIFFERENTIAL METHOD: ABNORMAL
EOSINOPHIL # BLD AUTO: 0.3 K/UL (ref 0–0.5)
EOSINOPHIL NFR BLD: 3.7 % (ref 0–8)
ERYTHROCYTE [DISTWIDTH] IN BLOOD BY AUTOMATED COUNT: 13.7 % (ref 11.5–14.5)
EST. GFR  (AFRICAN AMERICAN): >60 ML/MIN/1.73 M^2
EST. GFR  (NON AFRICAN AMERICAN): 54.9 ML/MIN/1.73 M^2
GLUCOSE SERPL-MCNC: 130 MG/DL (ref 70–110)
HCO3 UR-SCNC: 26.3 MMOL/L (ref 24–28)
HCT VFR BLD AUTO: 37.2 % (ref 37–48.5)
HGB BLD-MCNC: 11.7 G/DL (ref 12–16)
IMM GRANULOCYTES # BLD AUTO: 0.05 K/UL (ref 0–0.04)
IMM GRANULOCYTES NFR BLD AUTO: 0.6 % (ref 0–0.5)
LYMPHOCYTES # BLD AUTO: 2.7 K/UL (ref 1–4.8)
LYMPHOCYTES NFR BLD: 32.5 % (ref 18–48)
MCH RBC QN AUTO: 28.2 PG (ref 27–31)
MCHC RBC AUTO-ENTMCNC: 31.5 G/DL (ref 32–36)
MCV RBC AUTO: 90 FL (ref 82–98)
MONOCYTES # BLD AUTO: 0.7 K/UL (ref 0.3–1)
MONOCYTES NFR BLD: 7.8 % (ref 4–15)
NEUTROPHILS # BLD AUTO: 4.5 K/UL (ref 1.8–7.7)
NEUTROPHILS NFR BLD: 54.3 % (ref 38–73)
NRBC BLD-RTO: 0 /100 WBC
PCO2 BLDA: 34.9 MMHG (ref 35–45)
PH SMN: 7.49 [PH] (ref 7.35–7.45)
PLATELET # BLD AUTO: 319 K/UL (ref 150–350)
PMV BLD AUTO: 9.3 FL (ref 9.2–12.9)
PO2 BLDA: 27 MMHG (ref 40–60)
POC BE: 3 MMOL/L
POC SATURATED O2: 56 % (ref 95–100)
POC TCO2: 27 MMOL/L (ref 24–29)
POTASSIUM SERPL-SCNC: 4.2 MMOL/L (ref 3.5–5.1)
RBC # BLD AUTO: 4.15 M/UL (ref 4–5.4)
SAMPLE: ABNORMAL
SITE: ABNORMAL
SODIUM SERPL-SCNC: 139 MMOL/L (ref 136–145)
WBC # BLD AUTO: 8.34 K/UL (ref 3.9–12.7)

## 2020-02-05 PROCEDURE — 27000221 HC OXYGEN, UP TO 24 HOURS

## 2020-02-05 PROCEDURE — 94770 HC EXHALED C02 TEST: CPT

## 2020-02-05 PROCEDURE — 99284 EMERGENCY DEPT VISIT MOD MDM: CPT | Mod: 25

## 2020-02-05 PROCEDURE — 82375 ASSAY CARBOXYHB QUANT: CPT

## 2020-02-05 PROCEDURE — 85025 COMPLETE CBC W/AUTO DIFF WBC: CPT

## 2020-02-05 PROCEDURE — 25000242 PHARM REV CODE 250 ALT 637 W/ HCPCS: Performed by: EMERGENCY MEDICINE

## 2020-02-05 PROCEDURE — 99900035 HC TECH TIME PER 15 MIN (STAT)

## 2020-02-05 PROCEDURE — 82803 BLOOD GASES ANY COMBINATION: CPT

## 2020-02-05 PROCEDURE — 99284 EMERGENCY DEPT VISIT MOD MDM: CPT | Mod: ,,, | Performed by: EMERGENCY MEDICINE

## 2020-02-05 PROCEDURE — 99284 PR EMERGENCY DEPT VISIT,LEVEL IV: ICD-10-PCS | Mod: ,,, | Performed by: EMERGENCY MEDICINE

## 2020-02-05 PROCEDURE — 80048 BASIC METABOLIC PNL TOTAL CA: CPT

## 2020-02-05 RX ORDER — ALBUTEROL SULFATE 2.5 MG/.5ML
2.5 SOLUTION RESPIRATORY (INHALATION)
Status: COMPLETED | OUTPATIENT
Start: 2020-02-05 | End: 2020-02-05

## 2020-02-05 RX ADMIN — ALBUTEROL SULFATE 2.5 MG: 2.5 SOLUTION RESPIRATORY (INHALATION) at 07:02

## 2020-02-05 NOTE — TELEPHONE ENCOUNTER
3rd attempt:  Received referral from Dr. Cortes from [facility] for patient to have screening colonoscopy.  Attempted to call patient. No answer and message was left with my direct number for her to return call to schedule her procedure.    Unable to reach you letter mailed to patient address on file
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
## 2020-02-06 NOTE — ED NOTES
Two patient identifiers checked and confirmed.    APPEARANCE: Resting comfortably in no acute distress. Patient has clean hair, skin and nails. Clothing is appropriate and properly fastened. No burns noted to pt skin or hair. No visible soot or soiled clothing.  NEURO: Awake, alert, appropriate for age, and cooperative with a calm affect; pupils equal and round. Oriented x4.  HEENT: Head symmetrical. Bilateral eyes without redness or drainage.  CARDIAC: Regular rate and rhythm. S1, S2 noted.  RESPIRATORY: Airway is open and patent. Respirations are spontaneous on room air, even and unlabored. Normal respiratory effort and rate noted. Pt has horse voice at this time, denies feeling SOB. Bilateral breath sounds clear, no wheezes or stridor noted.   GI/: Abdomen soft and non-distended. Patient is reported to void and stool appropriately for age.   NEUROVASCULAR: All extremities are warm and pink with +2 pulses and capillary refill less than 3 seconds. No peripheral edema noted.  MUSCULOSKELETAL: Moves all extremities well; no obvious deformities noted. Pt ambulated independently with steady gait.   SKIN: Warm, dry, and intact. Mucus membranes moist and pink.   PSYCH: Pt has calm affect, appropriate speech and thought process.
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
## 2022-07-27 ENCOUNTER — HOSPITAL ENCOUNTER (EMERGENCY)
Facility: HOSPITAL | Age: 77
Discharge: HOME | End: 2022-07-27
Attending: EMERGENCY MEDICINE
Payer: MEDICARE

## 2022-07-27 VITALS
OXYGEN SATURATION: 96 % | TEMPERATURE: 98 F | BODY MASS INDEX: 28.25 KG/M2 | HEIGHT: 67 IN | DIASTOLIC BLOOD PRESSURE: 67 MMHG | WEIGHT: 180 LBS | SYSTOLIC BLOOD PRESSURE: 142 MMHG | RESPIRATION RATE: 18 BRPM | HEART RATE: 68 BPM

## 2022-07-27 DIAGNOSIS — R10.13 EPIGASTRIC ABDOMINAL PAIN: Primary | ICD-10-CM

## 2022-07-27 LAB
ALBUMIN SERPL BCP-MCNC: 3.7 G/DL (ref 3.5–5.2)
ALP SERPL-CCNC: 68 U/L (ref 55–135)
ALT SERPL W/O P-5'-P-CCNC: 8 U/L (ref 10–44)
ANION GAP SERPL CALC-SCNC: 11 MMOL/L (ref 8–16)
AST SERPL-CCNC: 14 U/L (ref 10–40)
BACTERIA #/AREA URNS AUTO: NORMAL /HPF
BASOPHILS # BLD AUTO: 0.06 K/UL (ref 0–0.2)
BASOPHILS NFR BLD: 0.7 % (ref 0–1.9)
BILIRUB SERPL-MCNC: 0.5 MG/DL (ref 0.1–1)
BILIRUB UR QL STRIP: NEGATIVE
BUN SERPL-MCNC: 21 MG/DL (ref 8–23)
BUN SERPL-MCNC: 23 MG/DL (ref 6–30)
CALCIUM SERPL-MCNC: 10.1 MG/DL (ref 8.7–10.5)
CHLORIDE SERPL-SCNC: 100 MMOL/L (ref 95–110)
CHLORIDE SERPL-SCNC: 101 MMOL/L (ref 95–110)
CLARITY UR REFRACT.AUTO: CLEAR
CO2 SERPL-SCNC: 25 MMOL/L (ref 23–29)
COLOR UR AUTO: ABNORMAL
CREAT SERPL-MCNC: 0.9 MG/DL (ref 0.5–1.4)
CREAT SERPL-MCNC: 0.9 MG/DL (ref 0.5–1.4)
DIFFERENTIAL METHOD: ABNORMAL
EOSINOPHIL # BLD AUTO: 0.1 K/UL (ref 0–0.5)
EOSINOPHIL NFR BLD: 1.4 % (ref 0–8)
ERYTHROCYTE [DISTWIDTH] IN BLOOD BY AUTOMATED COUNT: 13.2 % (ref 11.5–14.5)
EST. GFR  (AFRICAN AMERICAN): >60 ML/MIN/1.73 M^2
EST. GFR  (NON AFRICAN AMERICAN): >60 ML/MIN/1.73 M^2
GLUCOSE SERPL-MCNC: 107 MG/DL (ref 70–110)
GLUCOSE SERPL-MCNC: 111 MG/DL (ref 70–110)
GLUCOSE UR QL STRIP: NEGATIVE
HCT VFR BLD AUTO: 36.8 % (ref 37–48.5)
HCT VFR BLD CALC: 38 %PCV (ref 36–54)
HGB BLD-MCNC: 12 G/DL (ref 12–16)
HGB UR QL STRIP: ABNORMAL
IMM GRANULOCYTES # BLD AUTO: 0.03 K/UL (ref 0–0.04)
IMM GRANULOCYTES NFR BLD AUTO: 0.3 % (ref 0–0.5)
KETONES UR QL STRIP: NEGATIVE
LACTATE SERPL-SCNC: 1 MMOL/L (ref 0.5–2.2)
LEUKOCYTE ESTERASE UR QL STRIP: NEGATIVE
LIPASE SERPL-CCNC: 24 U/L (ref 4–60)
LYMPHOCYTES # BLD AUTO: 1.6 K/UL (ref 1–4.8)
LYMPHOCYTES NFR BLD: 18.2 % (ref 18–48)
MCH RBC QN AUTO: 27.8 PG (ref 27–31)
MCHC RBC AUTO-ENTMCNC: 32.6 G/DL (ref 32–36)
MCV RBC AUTO: 85 FL (ref 82–98)
MICROSCOPIC COMMENT: NORMAL
MONOCYTES # BLD AUTO: 0.5 K/UL (ref 0.3–1)
MONOCYTES NFR BLD: 5.3 % (ref 4–15)
NEUTROPHILS # BLD AUTO: 6.7 K/UL (ref 1.8–7.7)
NEUTROPHILS NFR BLD: 74.1 % (ref 38–73)
NITRITE UR QL STRIP: NEGATIVE
NRBC BLD-RTO: 0 /100 WBC
PH UR STRIP: 6 [PH] (ref 5–8)
PLATELET # BLD AUTO: 352 K/UL (ref 150–450)
PMV BLD AUTO: 9 FL (ref 9.2–12.9)
POC IONIZED CALCIUM: 1.31 MMOL/L (ref 1.06–1.42)
POC TCO2 (MEASURED): 27 MMOL/L (ref 23–29)
POTASSIUM BLD-SCNC: 3.3 MMOL/L (ref 3.5–5.1)
POTASSIUM SERPL-SCNC: 3.3 MMOL/L (ref 3.5–5.1)
PROT SERPL-MCNC: 7.6 G/DL (ref 6–8.4)
PROT UR QL STRIP: NEGATIVE
RBC # BLD AUTO: 4.31 M/UL (ref 4–5.4)
RBC #/AREA URNS AUTO: 1 /HPF (ref 0–4)
SAMPLE: ABNORMAL
SODIUM BLD-SCNC: 137 MMOL/L (ref 136–145)
SODIUM SERPL-SCNC: 137 MMOL/L (ref 136–145)
SP GR UR STRIP: 1.01 (ref 1–1.03)
SQUAMOUS #/AREA URNS AUTO: 0 /HPF
URN SPEC COLLECT METH UR: ABNORMAL
WBC # BLD AUTO: 9.02 K/UL (ref 3.9–12.7)
WBC #/AREA URNS AUTO: 1 /HPF (ref 0–5)

## 2022-07-27 PROCEDURE — 85025 COMPLETE CBC W/AUTO DIFF WBC: CPT | Performed by: EMERGENCY MEDICINE

## 2022-07-27 PROCEDURE — 83690 ASSAY OF LIPASE: CPT | Performed by: EMERGENCY MEDICINE

## 2022-07-27 PROCEDURE — 82330 ASSAY OF CALCIUM: CPT

## 2022-07-27 PROCEDURE — 99284 EMERGENCY DEPT VISIT MOD MDM: CPT | Mod: ,,, | Performed by: EMERGENCY MEDICINE

## 2022-07-27 PROCEDURE — 99284 PR EMERGENCY DEPT VISIT,LEVEL IV: ICD-10-PCS | Mod: ,,, | Performed by: EMERGENCY MEDICINE

## 2022-07-27 PROCEDURE — 96361 HYDRATE IV INFUSION ADD-ON: CPT

## 2022-07-27 PROCEDURE — 81001 URINALYSIS AUTO W/SCOPE: CPT | Performed by: EMERGENCY MEDICINE

## 2022-07-27 PROCEDURE — 25500020 PHARM REV CODE 255: Performed by: EMERGENCY MEDICINE

## 2022-07-27 PROCEDURE — 80047 BASIC METABLC PNL IONIZED CA: CPT

## 2022-07-27 PROCEDURE — 96376 TX/PRO/DX INJ SAME DRUG ADON: CPT

## 2022-07-27 PROCEDURE — 96374 THER/PROPH/DIAG INJ IV PUSH: CPT | Mod: 59

## 2022-07-27 PROCEDURE — 63600175 PHARM REV CODE 636 W HCPCS: Performed by: EMERGENCY MEDICINE

## 2022-07-27 PROCEDURE — 99285 EMERGENCY DEPT VISIT HI MDM: CPT | Mod: 25

## 2022-07-27 PROCEDURE — 25000003 PHARM REV CODE 250: Performed by: EMERGENCY MEDICINE

## 2022-07-27 PROCEDURE — 80053 COMPREHEN METABOLIC PANEL: CPT | Performed by: EMERGENCY MEDICINE

## 2022-07-27 PROCEDURE — 96375 TX/PRO/DX INJ NEW DRUG ADDON: CPT

## 2022-07-27 PROCEDURE — 83605 ASSAY OF LACTIC ACID: CPT | Performed by: EMERGENCY MEDICINE

## 2022-07-27 RX ORDER — ESOMEPRAZOLE MAGNESIUM 40 MG/1
40 CAPSULE, DELAYED RELEASE ORAL DAILY
Qty: 30 CAPSULE | Refills: 0 | OUTPATIENT
Start: 2022-07-27 | End: 2024-03-01

## 2022-07-27 RX ORDER — LOSARTAN POTASSIUM 50 MG/1
100 TABLET ORAL ONCE
Status: COMPLETED | OUTPATIENT
Start: 2022-07-27 | End: 2022-07-27

## 2022-07-27 RX ORDER — MAG HYDROX/ALUMINUM HYD/SIMETH 200-200-20
30 SUSPENSION, ORAL (FINAL DOSE FORM) ORAL ONCE
Status: COMPLETED | OUTPATIENT
Start: 2022-07-27 | End: 2022-07-27

## 2022-07-27 RX ORDER — AMLODIPINE BESYLATE 10 MG/1
10 TABLET ORAL
Status: COMPLETED | OUTPATIENT
Start: 2022-07-27 | End: 2022-07-27

## 2022-07-27 RX ORDER — MORPHINE SULFATE 4 MG/ML
4 INJECTION, SOLUTION INTRAMUSCULAR; INTRAVENOUS
Status: COMPLETED | OUTPATIENT
Start: 2022-07-27 | End: 2022-07-27

## 2022-07-27 RX ORDER — ONDANSETRON 2 MG/ML
4 INJECTION INTRAMUSCULAR; INTRAVENOUS
Status: COMPLETED | OUTPATIENT
Start: 2022-07-27 | End: 2022-07-27

## 2022-07-27 RX ORDER — LOSARTAN POTASSIUM 50 MG/1
100 TABLET ORAL DAILY
Status: DISCONTINUED | OUTPATIENT
Start: 2022-07-28 | End: 2022-07-27

## 2022-07-27 RX ORDER — MAG HYDROX/ALUMINUM HYD/SIMETH 200-200-20
30 SUSPENSION, ORAL (FINAL DOSE FORM) ORAL
Qty: 354 ML | Refills: 0 | Status: SHIPPED | OUTPATIENT
Start: 2022-07-27 | End: 2023-10-10

## 2022-07-27 RX ADMIN — ALUMINUM HYDROXIDE, MAGNESIUM HYDROXIDE, AND SIMETHICONE 30 ML: 200; 200; 20 SUSPENSION ORAL at 10:07

## 2022-07-27 RX ADMIN — MORPHINE SULFATE 4 MG: 4 INJECTION INTRAVENOUS at 06:07

## 2022-07-27 RX ADMIN — ONDANSETRON 4 MG: 2 INJECTION INTRAMUSCULAR; INTRAVENOUS at 01:07

## 2022-07-27 RX ADMIN — LOSARTAN POTASSIUM 100 MG: 50 TABLET, FILM COATED ORAL at 08:07

## 2022-07-27 RX ADMIN — MORPHINE SULFATE 4 MG: 4 INJECTION INTRAVENOUS at 01:07

## 2022-07-27 RX ADMIN — AMLODIPINE BESYLATE 10 MG: 10 TABLET ORAL at 06:07

## 2022-07-27 RX ADMIN — SODIUM CHLORIDE, SODIUM LACTATE, POTASSIUM CHLORIDE, AND CALCIUM CHLORIDE 1000 ML: .6; .31; .03; .02 INJECTION, SOLUTION INTRAVENOUS at 02:07

## 2022-07-27 RX ADMIN — IOHEXOL 75 ML: 350 INJECTION, SOLUTION INTRAVENOUS at 03:07

## 2022-07-27 NOTE — PROVIDER PROGRESS NOTES - EMERGENCY DEPT.
I have assumed care for this patient from Dr. Clark          5:11 PM . I have reviewed case, and have seen patient. I have read the chart and discussed care with the previous attending. I agree with the plan as previously determined. Since assuming care, the patient's course includes:      Patient presented with diffuse abd pain, CT a/p with GB findings      CT a/p: Pancreatic ductal dilatation at the pancreatic head.  Recommend clinical correlation and further evaluation with MRCP or ERCP as clinically warranted.   Mildly distension of gallbladder with trace pericholecystic fluid.  If clinically concerned for acute cholecystitis, further evaluation with right upper quadrant ultrasound may be obtained.   Mild intrahepatic biliary duct dilatation.  No significant extrahepatic biliary dilatation with normal tapering at the level of the ampulla.   Nodular soft tissue lesion near the right adrenal gland with punctate calcifications, nonspecific.  Recommend correlation with clinical history/laboratory values.   Bilateral pulmonary micro-nodules, largest measuring up to 5 mm. For multiple solid nodules all <6 mm, Fleischner Society 2017 guidelines recommend no routine follow up for a low risk patient, or follow up with non-contrast chest CT at 12 months after discovery in a high risk patient.     Pending:  Us GB  Normal LFTs/total bili      5:53 PM   patient reports 9/10 pain, + RUQ/epig area, started this am, one episode of vomiting, did not eat anything today. No weight loss, no prior similar pain    10:14 PM  General surgery recommendation for discharge with Maalox and PPI.  Patient comfortable with the plan has an appointment on Monday with her PCP to have an MRCP scheduled.   Given prescription for Maalox and Nexium.  Patient was instructed to return to the emergency department if she develops any worsening pain, vomiting, fever or any other concerns. Patient and family (daughter) voice understanding of the  instructions, all questions were answered

## 2022-07-27 NOTE — ED NOTES
I-STAT Chem-8+ Results:   Value Reference Range   Sodium 137 136-145 mmol/L   Potassium  3.3 3.5-5.1 mmol/L   Chloride 100  mmol/L   Ionized Calcium 1.31 1.06-1.42 mmol/L   CO2 (measured) 27 23-29 mmol/L   Glucose 111  mg/dL   BUN 23 6-30 mg/dL   Creatinine 0.9 0.5-1.4 mg/dL   Hematocrit 38 36-54%

## 2022-07-27 NOTE — ED NOTES
Patient identifiers verified and correct for  Ms Jacob  C/C: Abd pain SEE NN  APPEARANCE: awake and alert in NAD.  SKIN: warm, dry and intact. No breakdown or bruising.  MUSCULOSKELETAL: Patient moving all extremities spontaneously, no obvious swelling or deformities noted. Ambulates independently.  RESPIRATORY: Denies shortness of breath.Respirations unlabored.   CARDIAC: Denies CP, 2+ distal pulses; no peripheral edema  ABDOMEN: ABdomen larem round, reports pain from  Mid abdomen to lower abdomne, reports nausea, no emesis,   : voids spontaneously, denies difficulty  Neurologic: AAO x 4; follows commands equal strength in all extremities; denies numbness/tingling. Denies dizziness Denies new weakness
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
## 2022-07-27 NOTE — ED NOTES
Patient states abd pain from mid upper abdomen to lower abdomen onset this am, reports emesis x1, denies fevers

## 2022-07-28 NOTE — DISCHARGE INSTRUCTIONS
Please follow up with your PCP for outpatient MRCP    Return to the ED if pain gets worse, if vomiting and unable to keep food or fluids down, if fever or any other concerns

## 2022-07-28 NOTE — PLAN OF CARE
General Surgery    Reason for Consult: cholecystitis    History of Present Illness:  76 y.o. female with abdominal pain that began this AM.  States she had a hot sausage last night and went to bed without any pain, but woke up this AM with RUQ and bilateral lower quadrant pain.  She has never had similar symptoms before.      Allergies: amoxicillin  PMHx: HTN, DM 2  PSHx: Hysterectomy    ROS Negative except above    Vital Signs (Most Recent)  Temp: 98.1 °F (36.7 °C) (07/27/22 2010)  Pulse: 68 (07/27/22 2010)  Resp: 18 (07/27/22 2010)  BP: (!) 199/84 (07/27/22 2010)  SpO2: 96 % (07/27/22 2010)    Physical Exam     Labs- Grossly normal including white count, LFTs, lactate  Imaging- cholelithiasis.  CBD 6 mm.  On CT, common duct is dilated down into head of pancreas.    Assessment and Plan:  76 y.o. female w/ abdominal pain    -Hx more c/w gastritis.  She is seeing PCP in a few days.    -Reccomend GI cocktail and beginning protonix.  Reccomend MRCP at some point in the next month to r/o a pancreatic head mass.  No recent weight loss/concerning family hx.  Discussed this with pt/daughter.    Kong Pak MD  General Surgery, PGY-5  [phone]

## 2022-08-04 ENCOUNTER — OFFICE VISIT (OUTPATIENT)
Dept: RHEUMATOLOGY | Facility: CLINIC | Age: 77
End: 2022-08-04
Payer: MEDICARE

## 2022-08-04 ENCOUNTER — HOSPITAL ENCOUNTER (OUTPATIENT)
Dept: RADIOLOGY | Facility: HOSPITAL | Age: 77
Discharge: HOME | End: 2022-08-04
Attending: INTERNAL MEDICINE
Payer: MEDICARE

## 2022-08-04 VITALS
SYSTOLIC BLOOD PRESSURE: 138 MMHG | HEART RATE: 73 BPM | WEIGHT: 178.38 LBS | BODY MASS INDEX: 31.61 KG/M2 | HEIGHT: 63 IN | DIASTOLIC BLOOD PRESSURE: 78 MMHG | TEMPERATURE: 99 F

## 2022-08-04 DIAGNOSIS — M15.9 PRIMARY OSTEOARTHRITIS INVOLVING MULTIPLE JOINTS: ICD-10-CM

## 2022-08-04 DIAGNOSIS — E11.9 TYPE 2 DIABETES MELLITUS WITHOUT COMPLICATION, WITHOUT LONG-TERM CURRENT USE OF INSULIN: ICD-10-CM

## 2022-08-04 DIAGNOSIS — M13.0 POLYARTHRITIS: Primary | ICD-10-CM

## 2022-08-04 DIAGNOSIS — M13.0 POLYARTHRITIS: ICD-10-CM

## 2022-08-04 DIAGNOSIS — E11.618 DIABETIC CHEIRARTHROPATHY: ICD-10-CM

## 2022-08-04 PROBLEM — M15.0 PRIMARY OSTEOARTHRITIS INVOLVING MULTIPLE JOINTS: Status: ACTIVE | Noted: 2022-08-04

## 2022-08-04 PROCEDURE — 1160F RVW MEDS BY RX/DR IN RCRD: CPT | Mod: CPTII,S$GLB,, | Performed by: INTERNAL MEDICINE

## 2022-08-04 PROCEDURE — 1160F PR REVIEW ALL MEDS BY PRESCRIBER/CLIN PHARMACIST DOCUMENTED: ICD-10-PCS | Mod: CPTII,S$GLB,, | Performed by: INTERNAL MEDICINE

## 2022-08-04 PROCEDURE — 73130 XR HAND COMPLETE 3 VIEWS BILATERAL: ICD-10-PCS | Mod: 26,50,, | Performed by: RADIOLOGY

## 2022-08-04 PROCEDURE — 3075F SYST BP GE 130 - 139MM HG: CPT | Mod: CPTII,S$GLB,, | Performed by: INTERNAL MEDICINE

## 2022-08-04 PROCEDURE — 3078F PR MOST RECENT DIASTOLIC BLOOD PRESSURE < 80 MM HG: ICD-10-PCS | Mod: CPTII,S$GLB,, | Performed by: INTERNAL MEDICINE

## 2022-08-04 PROCEDURE — 1125F PR PAIN SEVERITY QUANTIFIED, PAIN PRESENT: ICD-10-PCS | Mod: CPTII,S$GLB,, | Performed by: INTERNAL MEDICINE

## 2022-08-04 PROCEDURE — 73130 X-RAY EXAM OF HAND: CPT | Mod: 26,50,, | Performed by: RADIOLOGY

## 2022-08-04 PROCEDURE — 1159F PR MEDICATION LIST DOCUMENTED IN MEDICAL RECORD: ICD-10-PCS | Mod: CPTII,S$GLB,, | Performed by: INTERNAL MEDICINE

## 2022-08-04 PROCEDURE — 99204 PR OFFICE/OUTPT VISIT, NEW, LEVL IV, 45-59 MIN: ICD-10-PCS | Mod: S$GLB,,, | Performed by: INTERNAL MEDICINE

## 2022-08-04 PROCEDURE — 1159F MED LIST DOCD IN RCRD: CPT | Mod: CPTII,S$GLB,, | Performed by: INTERNAL MEDICINE

## 2022-08-04 PROCEDURE — 99999 PR PBB SHADOW E&M-EST. PATIENT-LVL IV: CPT | Mod: PBBFAC,,, | Performed by: INTERNAL MEDICINE

## 2022-08-04 PROCEDURE — 73130 X-RAY EXAM OF HAND: CPT | Mod: TC,50

## 2022-08-04 PROCEDURE — 99999 PR PBB SHADOW E&M-EST. PATIENT-LVL IV: ICD-10-PCS | Mod: PBBFAC,,, | Performed by: INTERNAL MEDICINE

## 2022-08-04 PROCEDURE — 3078F DIAST BP <80 MM HG: CPT | Mod: CPTII,S$GLB,, | Performed by: INTERNAL MEDICINE

## 2022-08-04 PROCEDURE — 99204 OFFICE O/P NEW MOD 45 MIN: CPT | Mod: S$GLB,,, | Performed by: INTERNAL MEDICINE

## 2022-08-04 PROCEDURE — 1125F AMNT PAIN NOTED PAIN PRSNT: CPT | Mod: CPTII,S$GLB,, | Performed by: INTERNAL MEDICINE

## 2022-08-04 PROCEDURE — 3075F PR MOST RECENT SYSTOLIC BLOOD PRESS GE 130-139MM HG: ICD-10-PCS | Mod: CPTII,S$GLB,, | Performed by: INTERNAL MEDICINE

## 2022-08-04 RX ORDER — MELOXICAM 15 MG/1
15 TABLET ORAL DAILY
COMMUNITY
Start: 2022-07-31 | End: 2022-08-04

## 2022-08-04 RX ORDER — GABAPENTIN 100 MG/1
100 CAPSULE ORAL 3 TIMES DAILY
Qty: 90 CAPSULE | Refills: 11 | OUTPATIENT
Start: 2022-08-04 | End: 2024-03-01

## 2022-08-04 RX ORDER — NABUMETONE 500 MG/1
500 TABLET, FILM COATED ORAL 2 TIMES DAILY
Qty: 60 TABLET | Refills: 5 | Status: SHIPPED | OUTPATIENT
Start: 2022-08-04 | End: 2022-09-03

## 2022-08-04 ASSESSMENT — ROUTINE ASSESSMENT OF PATIENT INDEX DATA (RAPID3)
PAIN SCORE: 10
TOTAL RAPID3 SCORE: 4.94
AM STIFFNESS SCORE: 1, YES
MDHAQ FUNCTION SCORE: 0.4
WHEN YOU AWAKENED IN THE MORNING OVER THE LAST WEEK, PLEASE INDICATE THE AMOUNT OF TIME IT TAKES UNTIL YOU ARE AS LIMBER AS YOU WILL BE FOR THE DAY: 5 MINS
PSYCHOLOGICAL DISTRESS SCORE: 1.1
PATIENT GLOBAL ASSESSMENT SCORE: 3.5
FATIGUE SCORE: 8

## 2022-08-04 NOTE — PROGRESS NOTES
History of present illness:  76-year-old female with history of diabetes mellitus.  She has been having problems with her hand for the past 6 months.  She had no history of antecedent URI or vaccine.  It started out with itching in the hands.  She then noticed she was unable to bend the hands.  She complained of some swelling in the PIP is.  The pain is bad in the morning.  She has 15 minutes of morning stiffness.  It also increases with activity.  It does wake her up at night.  She has difficulty using her hands.  She has occasional locking in the fingers.  She denies any numbness in the hands.  Her worst problem are her DIPs.  She has occasional pain in the shoulder.  She has no problems in the wrist or elbow.  She denies any problem in the neck but has chronic low back pain.  She has no pain in the lower extremities but has occasional swelling in her feet.    She has been using topical medications without any response.  Heat has given her some relief.  She has been taking meloxicam but this is not helping.    No fever, headache, rash, conjunctivitis, oral ulcers, dry eyes or mouth, Raynaud's phenomenon, pleurisy, chronic or bloody diarrhea, vaginal or urethral discharge or ulcer, numbness or tingling, blood clots or phlebitis.  She is a  5 para 5, 1 child  2 days postpartum.  Her mother might have had rheumatoid arthritis.    Systems review:  General:  Weight has been stable  GI:  She had recent abdominal pain and was told she had gallbladder disease.  She is scheduled to see a surgeon.  She has no peptic ulcer disease.  She has no liver problems.  :  No kidney or bladder problems    Physical examination:  Skin:  No rashes  ENT:  Adequate tears in saliva.  No conjunctivitis or oral ulcers.  Chest:  Clear to auscultation  Cardiac:  No murmurs, gallops, rubs  Abdomen:  No organomegaly or masses.  No tenderness to palpation  Extremities:  No pedal edema  Musculoskeletal:  She has decreased range of  motion of the cervical spine but no pain on range of motion.  She has decreased range of motion of the left shoulder.  She has some tenderness across the shoulder girdle.  Right shoulder is unremarkable.  Elbows and wrists are unremarkable.  She has bony hypertrophy of the PIP, especially the left 3rd.  She has no synovitis in the hands.  She has no tendon nodules.  She is unable to make a fist.  She has decreased  strength.  She has a negative Tinel sign.  She has tenderness to palpation of the lower lumbar spine.  She has pain on lateral bending.  Straight leg raising is negative.  She has pain on range of motion of the hips bilaterally.  She has good range of motion.  Knees are unremarkable.  She has tenderness of the left ankle with pain on range of motion.  Small joints the feet are unremarkable.    Assessment:  1. I suspect we are dealing with osteoarthritis  2. She may also have diabetic neuropathy and cheirarthropathy.     Plans:  1. Laboratory studies obtained  2. I have ordered an x-ray of her hands  3. Discontinue meloxicam in change to nabumetone 500 mg twice daily  4. I placed her on gabapentin 100 mg 3 times daily for the itching in her hands.  I told her she could just take it at bedtime if it makes her drowsy  5. I recommend soaking her hands and bending her hands while she is soaking them.  6. I will consider physical therapy in the future  7. Return in 2 months

## 2022-08-05 ENCOUNTER — TELEPHONE (OUTPATIENT)
Dept: RHEUMATOLOGY | Facility: CLINIC | Age: 77
End: 2022-08-05
Payer: MEDICARE

## 2022-08-05 NOTE — TELEPHONE ENCOUNTER
----- Message from Raul Degroot MD sent at 8/5/2022  3:11 PM CDT -----  The blood work was all normal.  No evidence of rheumatoid arthritis or lupus.  The x-ray shows a lot of osteoarthritis in the hands.  That is what I was expecting.

## 2022-08-05 NOTE — TELEPHONE ENCOUNTER
The blood work was all normal.  No evidence of rheumatoid arthritis or lupus.  The x-ray shows a lot of osteoarthritis in the hands.  That is what I was expecting.

## 2022-10-10 DIAGNOSIS — E11.9 TYPE 2 DIABETES MELLITUS WITHOUT COMPLICATIONS: Primary | ICD-10-CM

## 2023-01-13 ENCOUNTER — OFFICE VISIT (OUTPATIENT)
Dept: OTOLARYNGOLOGY | Facility: CLINIC | Age: 78
End: 2023-01-13
Payer: MEDICARE

## 2023-01-13 ENCOUNTER — OFFICE VISIT (OUTPATIENT)
Dept: SURGERY | Facility: CLINIC | Age: 78
End: 2023-01-13
Payer: MEDICARE

## 2023-01-13 VITALS — WEIGHT: 196 LBS | HEIGHT: 63 IN | BODY MASS INDEX: 34.73 KG/M2

## 2023-01-13 VITALS
BODY MASS INDEX: 34.53 KG/M2 | HEART RATE: 70 BPM | OXYGEN SATURATION: 97 % | HEIGHT: 63 IN | DIASTOLIC BLOOD PRESSURE: 76 MMHG | WEIGHT: 194.88 LBS | SYSTOLIC BLOOD PRESSURE: 182 MMHG

## 2023-01-13 DIAGNOSIS — R04.0 RECURRENT EPISTAXIS: Primary | ICD-10-CM

## 2023-01-13 DIAGNOSIS — K80.20 ASYMPTOMATIC CHOLELITHIASIS: Primary | ICD-10-CM

## 2023-01-13 PROCEDURE — 3078F PR MOST RECENT DIASTOLIC BLOOD PRESSURE < 80 MM HG: ICD-10-PCS | Mod: CPTII,S$GLB,, | Performed by: STUDENT IN AN ORGANIZED HEALTH CARE EDUCATION/TRAINING PROGRAM

## 2023-01-13 PROCEDURE — 1126F PR PAIN SEVERITY QUANTIFIED, NO PAIN PRESENT: ICD-10-PCS | Mod: CPTII,S$GLB,, | Performed by: STUDENT IN AN ORGANIZED HEALTH CARE EDUCATION/TRAINING PROGRAM

## 2023-01-13 PROCEDURE — 99999 PR PBB SHADOW E&M-EST. PATIENT-LVL IV: ICD-10-PCS | Mod: PBBFAC,,, | Performed by: PHYSICIAN ASSISTANT

## 2023-01-13 PROCEDURE — 3288F FALL RISK ASSESSMENT DOCD: CPT | Mod: CPTII,S$GLB,, | Performed by: PHYSICIAN ASSISTANT

## 2023-01-13 PROCEDURE — 1159F PR MEDICATION LIST DOCUMENTED IN MEDICAL RECORD: ICD-10-PCS | Mod: CPTII,S$GLB,, | Performed by: PHYSICIAN ASSISTANT

## 2023-01-13 PROCEDURE — 99999 PR PBB SHADOW E&M-EST. PATIENT-LVL III: ICD-10-PCS | Mod: PBBFAC,,, | Performed by: STUDENT IN AN ORGANIZED HEALTH CARE EDUCATION/TRAINING PROGRAM

## 2023-01-13 PROCEDURE — 3078F DIAST BP <80 MM HG: CPT | Mod: CPTII,S$GLB,, | Performed by: STUDENT IN AN ORGANIZED HEALTH CARE EDUCATION/TRAINING PROGRAM

## 2023-01-13 PROCEDURE — 1101F PR PT FALLS ASSESS DOC 0-1 FALLS W/OUT INJ PAST YR: ICD-10-PCS | Mod: CPTII,S$GLB,, | Performed by: STUDENT IN AN ORGANIZED HEALTH CARE EDUCATION/TRAINING PROGRAM

## 2023-01-13 PROCEDURE — 1101F PR PT FALLS ASSESS DOC 0-1 FALLS W/OUT INJ PAST YR: ICD-10-PCS | Mod: CPTII,S$GLB,, | Performed by: PHYSICIAN ASSISTANT

## 2023-01-13 PROCEDURE — 3077F SYST BP >= 140 MM HG: CPT | Mod: CPTII,S$GLB,, | Performed by: STUDENT IN AN ORGANIZED HEALTH CARE EDUCATION/TRAINING PROGRAM

## 2023-01-13 PROCEDURE — 1101F PT FALLS ASSESS-DOCD LE1/YR: CPT | Mod: CPTII,S$GLB,, | Performed by: STUDENT IN AN ORGANIZED HEALTH CARE EDUCATION/TRAINING PROGRAM

## 2023-01-13 PROCEDURE — 3288F PR FALLS RISK ASSESSMENT DOCUMENTED: ICD-10-PCS | Mod: CPTII,S$GLB,, | Performed by: STUDENT IN AN ORGANIZED HEALTH CARE EDUCATION/TRAINING PROGRAM

## 2023-01-13 PROCEDURE — 1126F PR PAIN SEVERITY QUANTIFIED, NO PAIN PRESENT: ICD-10-PCS | Mod: CPTII,S$GLB,, | Performed by: PHYSICIAN ASSISTANT

## 2023-01-13 PROCEDURE — 3288F FALL RISK ASSESSMENT DOCD: CPT | Mod: CPTII,S$GLB,, | Performed by: STUDENT IN AN ORGANIZED HEALTH CARE EDUCATION/TRAINING PROGRAM

## 2023-01-13 PROCEDURE — 1101F PT FALLS ASSESS-DOCD LE1/YR: CPT | Mod: CPTII,S$GLB,, | Performed by: PHYSICIAN ASSISTANT

## 2023-01-13 PROCEDURE — 99203 OFFICE O/P NEW LOW 30 MIN: CPT | Mod: S$GLB,,, | Performed by: STUDENT IN AN ORGANIZED HEALTH CARE EDUCATION/TRAINING PROGRAM

## 2023-01-13 PROCEDURE — 3077F PR MOST RECENT SYSTOLIC BLOOD PRESSURE >= 140 MM HG: ICD-10-PCS | Mod: CPTII,S$GLB,, | Performed by: STUDENT IN AN ORGANIZED HEALTH CARE EDUCATION/TRAINING PROGRAM

## 2023-01-13 PROCEDURE — 30801 ABLATE INF TURBINATE SUPERF: CPT | Mod: S$GLB,,, | Performed by: PHYSICIAN ASSISTANT

## 2023-01-13 PROCEDURE — 1126F AMNT PAIN NOTED NONE PRSNT: CPT | Mod: CPTII,S$GLB,, | Performed by: STUDENT IN AN ORGANIZED HEALTH CARE EDUCATION/TRAINING PROGRAM

## 2023-01-13 PROCEDURE — 1126F AMNT PAIN NOTED NONE PRSNT: CPT | Mod: CPTII,S$GLB,, | Performed by: PHYSICIAN ASSISTANT

## 2023-01-13 PROCEDURE — 99999 PR PBB SHADOW E&M-EST. PATIENT-LVL IV: CPT | Mod: PBBFAC,,, | Performed by: PHYSICIAN ASSISTANT

## 2023-01-13 PROCEDURE — 3288F PR FALLS RISK ASSESSMENT DOCUMENTED: ICD-10-PCS | Mod: CPTII,S$GLB,, | Performed by: PHYSICIAN ASSISTANT

## 2023-01-13 PROCEDURE — 1159F MED LIST DOCD IN RCRD: CPT | Mod: CPTII,S$GLB,, | Performed by: STUDENT IN AN ORGANIZED HEALTH CARE EDUCATION/TRAINING PROGRAM

## 2023-01-13 PROCEDURE — 99204 PR OFFICE/OUTPT VISIT, NEW, LEVL IV, 45-59 MIN: ICD-10-PCS | Mod: 25,S$GLB,, | Performed by: PHYSICIAN ASSISTANT

## 2023-01-13 PROCEDURE — 99999 PR PBB SHADOW E&M-EST. PATIENT-LVL III: CPT | Mod: PBBFAC,,, | Performed by: STUDENT IN AN ORGANIZED HEALTH CARE EDUCATION/TRAINING PROGRAM

## 2023-01-13 PROCEDURE — 99203 PR OFFICE/OUTPT VISIT, NEW, LEVL III, 30-44 MIN: ICD-10-PCS | Mod: S$GLB,,, | Performed by: STUDENT IN AN ORGANIZED HEALTH CARE EDUCATION/TRAINING PROGRAM

## 2023-01-13 PROCEDURE — 30801 PR CAUTER TURBINATE MUCOSA,SUPERFICIAL: ICD-10-PCS | Mod: S$GLB,,, | Performed by: PHYSICIAN ASSISTANT

## 2023-01-13 PROCEDURE — 1159F PR MEDICATION LIST DOCUMENTED IN MEDICAL RECORD: ICD-10-PCS | Mod: CPTII,S$GLB,, | Performed by: STUDENT IN AN ORGANIZED HEALTH CARE EDUCATION/TRAINING PROGRAM

## 2023-01-13 PROCEDURE — 1159F MED LIST DOCD IN RCRD: CPT | Mod: CPTII,S$GLB,, | Performed by: PHYSICIAN ASSISTANT

## 2023-01-13 PROCEDURE — 99204 OFFICE O/P NEW MOD 45 MIN: CPT | Mod: 25,S$GLB,, | Performed by: PHYSICIAN ASSISTANT

## 2023-01-13 RX ORDER — MUPIROCIN 20 MG/G
OINTMENT TOPICAL 2 TIMES DAILY
Qty: 15 G | Refills: 2 | Status: SHIPPED | OUTPATIENT
Start: 2023-01-13 | End: 2023-01-20

## 2023-01-13 NOTE — PROGRESS NOTES
Subjective:     Lesly Jacob is a 77 y.o. female who was referred to me by Other in consultation for nosebleeds.    Patient reports her 1st nosebleed less than 5 years ago out of her left nose.  She was evaluated by a provider who did an in office procedure with possible cautery and then placed packing.  Patient reports a resolution of her nosebleed for quite some time.  Patient reports nosebleeds now intermittent and recurrent.  Usually she is able to control the bleeding with just pinching her nose.  Patient reports her mother having recurrent nosebleeds from both nose is.  Patient denies any easy bruising or bleeding.  She denies any regular nasal congestion or rhinorrhea.    She has previously had nasal cauterization.  The bleeding has required packing for control.  The last episode was 3 days ago, and is not currently active.  There is not a prior history of nasal surgery.  There is not a prior history of nasal trauma.  There is a history of environmental allergies.  She does not currently use a nasal spray.  There is not a family history to suggest a clotting disorder.  She does not currently take a blood-thinning agent.     Past Medical History  She has a past medical history of Bronchitis, Diabetes mellitus, Hyperlipidemia, Hypertension, Pneumonia, and Pneumonia.    Past Surgical History  She has a past surgical history that includes Hysterectomy.    Family History  Her family history includes Heart attack in her brother; Heart failure in her mother; Hypertension in her mother.    Social History  She reports that she has never smoked. She has never used smokeless tobacco. She reports that she does not drink alcohol and does not use drugs.    Allergies  She is allergic to amoxicillin.    Medications  She has a current medication list which includes the following prescription(s): aluminum-magnesium hydroxide-simethicone, amlodipine, aspirin, atorvastatin, calcium carbonate-vitamin d3, carvedilol,  clotrimazole, esomeprazole, fluoxetine, gabapentin, hydroxyzine hcl, ketorolac 0.5%, losartan-hydrochlorothiazide 100-25 mg, losartan-hydrochlorothiazide 100-25 mg, metformin, neomycin-bacitracin-polymyxin, ofloxacin, and prednisolone acetate.    Review of Systems     Constitutional: Negative for chills and fever.      HENT: Positive for nosebleeds.  Negative for ear discharge, ear pain, hearing loss and postnasal drip.      Respiratory:  Negative for cough and snoring.      Allergy: Negative for seasonal allergies.     Neurological: Negative for dizziness and headaches.      Hematologic: Negative for swollen glands.      Psychiatric: Negative for sleep disturbance.             Objective:     There were no vitals taken for this visit.     Constitutional:   She appears well-developed and well-nourished. Normal speech.      Head:  Normocephalic and atraumatic. Facial strength is normal.      Ears:    Right Ear: No drainage or swelling. Tympanic membrane is not perforated and not erythematous. No middle ear effusion.   Left Ear: No drainage or swelling. Tympanic membrane is not perforated and not erythematous.  No middle ear effusion.     Nose:  No mucosal edema, rhinorrhea, septal deviation, nasal septal hematoma or polyps.  No foreign bodies. Turbinates normal, no turbinate masses and no turbinate hypertrophy.  Right sinus exhibits no maxillary sinus tenderness and no frontal sinus tenderness. Left sinus exhibits no maxillary sinus tenderness and no frontal sinus tenderness.   L Little's area with exophytic, pink lesion <1mm     Mouth/Throat  Oropharynx clear and moist without lesions or asymmetry, normal uvula midline and lips, teeth, and gums normal. No trismus or mucous membrane lesions. No oropharyngeal exudate, posterior oropharyngeal edema or posterior oropharyngeal erythema. Tonsils present.      Neck:  Neck normal without thyromegaly masses, asymmetry, normal tracheal structure, crepitus, and tenderness and  phonation normal.     Pulmonary/Chest:   Effort normal.     Psychiatric:   She has a normal mood and affect. Her speech is normal and behavior is normal.     Procedure    Nasal cauterization performed.  See procedure note.    Indication:  Epistaxis    Informed consent was obtained prior to proceeding.  The left nasal cavity was anesthetized with topical 4% lidocaine.  Bleeding was localized to the left nasal cavity in Little's area and cauterized with silver nitrate.  Bleeding was controlled after.    The patient tolerated the procedure well.   Data Reviewed    Hemoglobin (g/dL)   Date Value   07/27/2022 12.0     POC Hematocrit (%PCV)   Date Value   07/27/2022 38     Hematocrit (%)   Date Value   07/27/2022 36.8 (L)     Platelets (K/uL)   Date Value   07/27/2022 352     Prothrombin Time (sec)   Date Value   10/20/2008 10.1     aPTT (sec)   Date Value   10/20/2008 23.9     INR (no units)   Date Value   10/20/2008 1.0       Assessment and Plan:     1. Recurrent epistaxis  -     Suspect hemangioma as etiology for epistaxis so high risk for re-bleeding  -  Had a lengthy conversation with the patient regarding the etiology of epistaxis and management strategies.  Patient provided detailed instructions for the management and prevention of nosebleeds and written instructions were given in their AVS.  She was also advised about when to seek emergency care.    - START mupirocin (BACTROBAN) 2 % ointment; Apply topically 2 (two) times daily. for 7 days  Dispense: 15 g; Refill: 2  - START nasal saline spray BID    She will Follow up in about 6 weeks (around 2/24/2023) for check for rebleeding.   I had a discussion with the patient and her daughter Felecia  regarding her condition and the further workup and management options.    All questions were answered, and the patient is in agreement with the above.

## 2023-01-13 NOTE — PATIENT INSTRUCTIONS
"Do not blow nose for 1 week.  Sneeze with mouth open.  Use nasal saline (brand: "Ayr") twice daily (at the least) to keep mucous membranes hydrated.   Your provider may prescribe antibiotic ointment for your nose- it will help keep the nose hydrated and prevent infection.   Do not take ibuprofen or drink alcohol for 1 week. Do NOT stop scheduled blood thinners unless directed by your provider.   If bleeding recurs, use oxymetazoline (Afrin) spray in the nostril and call for follow-up appointment.  Go to ER if bleeding:  - persists for greater than 15 minutes (despite above measures)  - severe bleeding or lightheadedness  - bleeding episodes become frequent    Other measures to reduce recurrence:  - Avoid heavy lifting and straining for at 2 least weeks  - Do not pick your nose or put anything into it. These actions may dislodge any blood clots.  - For the next few days, elevate your head on several pillows when lying down.  - Do not use a straw to drink as the sucking motion may cause bleeding.   "

## 2023-01-13 NOTE — PROGRESS NOTES
General Surgery Office Visit   History and Physical    Patient Name: Lesly Jacob  YOB: 1945 (77 y.o.)  MRN: [id number]  Today's Date: 01/13/2023    Referring Md:   [place] [id number]    SUBJECTIVE:     Chief Complaint: cholelithiasis    History of Present Illness:  Lesly Jacob is a 77 y.o. female with past medical history of T2DM, HTN, and HLD presents to clinic today for evaluation and recommendations regarding cholelithiasis noted on RUQ US in July of last year. At that time she had an acute attack of abdominal pain and nausea with ED workup revealing only a few small gallstones with the largest measuring 3 mm. Lab work at the time was benign. This was the first time she had abdominal pain like that. Since then, for the past 6 months, she has not had any recurrent abdominal pains or nausea. She has been feeling well. No fever, chills, nausea, vomiting, or irregularities with her bowel function.    Had nausea and vomiting with amoxicillin. Prior abdominal surgeries include a hysterectomy. She does not take any blood thinners.    Review of patient's allergies indicates:   Allergen Reactions    Amoxicillin Nausea And Vomiting     Past Medical History:   Diagnosis Date    Bronchitis     Diabetes mellitus     Hyperlipidemia     Hypertension     Pneumonia     Pneumonia      Past Surgical History:   Procedure Laterality Date    HYSTERECTOMY       Family History   Problem Relation Age of Onset    Heart failure Mother     Hypertension Mother     Heart attack Brother      Social History     Tobacco Use    Smoking status: Never    Smokeless tobacco: Never   Substance Use Topics    Alcohol use: No    Drug use: No        Review of Systems:  Review of Systems   Constitutional:  Negative for chills and fever.   Respiratory:  Negative for cough and shortness of breath.    Cardiovascular:  Negative for chest pain.   Gastrointestinal:  Negative for abdominal pain,  "constipation, nausea and vomiting.   Genitourinary:  Negative for dysuria.   Neurological:  Negative for dizziness.     OBJECTIVE:     Vital Signs (Most Recent)  BP (!) 182/76 (BP Location: Right arm, Patient Position: Sitting)   Pulse 70   Ht 5' 2.5" (1.588 m)   Wt 88.4 kg (194 lb 14.2 oz)   SpO2 97%   BMI 35.08 kg/m²     Physical Exam:  Physical Exam  Vitals reviewed.   Constitutional:       Appearance: Normal appearance.   Cardiovascular:      Rate and Rhythm: Normal rate.      Pulses: Normal pulses.   Pulmonary:      Effort: Pulmonary effort is normal.   Abdominal:      General: There is no distension.      Palpations: Abdomen is soft. There is no mass.      Tenderness: There is no abdominal tenderness. There is no guarding.   Skin:     General: Skin is warm and dry.   Neurological:      General: No focal deficit present.      Mental Status: She is alert and oriented to person, place, and time.     Labs:   reviewed    Diagnostic Results:  reviewed    ASSESSMENT/PLAN:     Lesly Jacob is a 77 y.o. female presenting for evaluation of cholelithiasis. She has been asymptomatic since the gallstones were first noted 6 months ago. It is possible that her abdominal pain and nausea 6 months ago was caused by the small (3 mm) gallstones, however given that she has been asymptomatic since that time, the risks of surgery likely outweigh any potential benefit for cholecystectomy.    - Follow up in clinic prn if symptoms recur    Demetrius Beebe MD  [facility]      "

## 2023-04-20 ENCOUNTER — HOSPITAL ENCOUNTER (EMERGENCY)
Facility: HOSPITAL | Age: 78
Discharge: HOME | End: 2023-04-20
Attending: EMERGENCY MEDICINE
Payer: MEDICARE

## 2023-04-20 VITALS
RESPIRATION RATE: 18 BRPM | DIASTOLIC BLOOD PRESSURE: 75 MMHG | OXYGEN SATURATION: 98 % | SYSTOLIC BLOOD PRESSURE: 145 MMHG | HEIGHT: 62 IN | TEMPERATURE: 98 F | BODY MASS INDEX: 35.7 KG/M2 | HEART RATE: 78 BPM | WEIGHT: 194 LBS

## 2023-04-20 DIAGNOSIS — R04.0 EPISTAXIS: Primary | ICD-10-CM

## 2023-04-20 DIAGNOSIS — I10 HYPERTENSION, UNSPECIFIED TYPE: ICD-10-CM

## 2023-04-20 LAB
BASOPHILS # BLD AUTO: 0.08 K/UL (ref 0–0.2)
BASOPHILS NFR BLD: 0.9 % (ref 0–1.9)
DIFFERENTIAL METHOD: ABNORMAL
EOSINOPHIL # BLD AUTO: 0.4 K/UL (ref 0–0.5)
EOSINOPHIL NFR BLD: 4.4 % (ref 0–8)
ERYTHROCYTE [DISTWIDTH] IN BLOOD BY AUTOMATED COUNT: 13.5 % (ref 11.5–14.5)
HCT VFR BLD AUTO: 37.7 % (ref 37–48.5)
HGB BLD-MCNC: 12 G/DL (ref 12–16)
IMM GRANULOCYTES # BLD AUTO: 0.02 K/UL (ref 0–0.04)
IMM GRANULOCYTES NFR BLD AUTO: 0.2 % (ref 0–0.5)
LYMPHOCYTES # BLD AUTO: 3.5 K/UL (ref 1–4.8)
LYMPHOCYTES NFR BLD: 39.3 % (ref 18–48)
MCH RBC QN AUTO: 28 PG (ref 27–31)
MCHC RBC AUTO-ENTMCNC: 31.8 G/DL (ref 32–36)
MCV RBC AUTO: 88 FL (ref 82–98)
MONOCYTES # BLD AUTO: 0.8 K/UL (ref 0.3–1)
MONOCYTES NFR BLD: 8.6 % (ref 4–15)
NEUTROPHILS # BLD AUTO: 4.2 K/UL (ref 1.8–7.7)
NEUTROPHILS NFR BLD: 46.6 % (ref 38–73)
NRBC BLD-RTO: 0 /100 WBC
PLATELET # BLD AUTO: 268 K/UL (ref 150–450)
PMV BLD AUTO: 8.9 FL (ref 9.2–12.9)
RBC # BLD AUTO: 4.29 M/UL (ref 4–5.4)
WBC # BLD AUTO: 8.93 K/UL (ref 3.9–12.7)

## 2023-04-20 PROCEDURE — 99284 EMERGENCY DEPT VISIT MOD MDM: CPT | Mod: 25

## 2023-04-20 PROCEDURE — 30901 CONTROL OF NOSEBLEED: CPT | Mod: LT,,, | Performed by: EMERGENCY MEDICINE

## 2023-04-20 PROCEDURE — 25000003 PHARM REV CODE 250: Performed by: EMERGENCY MEDICINE

## 2023-04-20 PROCEDURE — 96374 THER/PROPH/DIAG INJ IV PUSH: CPT | Mod: 59

## 2023-04-20 PROCEDURE — 63600175 PHARM REV CODE 636 W HCPCS: Performed by: EMERGENCY MEDICINE

## 2023-04-20 PROCEDURE — 30901 PR CTRL 2SEBLEED,ANTER,SIMPLE: ICD-10-PCS | Mod: LT,,, | Performed by: EMERGENCY MEDICINE

## 2023-04-20 PROCEDURE — 99284 EMERGENCY DEPT VISIT MOD MDM: CPT | Mod: 25,,, | Performed by: EMERGENCY MEDICINE

## 2023-04-20 PROCEDURE — 25000003 PHARM REV CODE 250: Performed by: PHYSICIAN ASSISTANT

## 2023-04-20 PROCEDURE — 85025 COMPLETE CBC W/AUTO DIFF WBC: CPT | Performed by: EMERGENCY MEDICINE

## 2023-04-20 PROCEDURE — 99284 PR EMERGENCY DEPT VISIT,LEVEL IV: ICD-10-PCS | Mod: 25,,, | Performed by: EMERGENCY MEDICINE

## 2023-04-20 PROCEDURE — 30901 CONTROL OF NOSEBLEED: CPT | Mod: LT

## 2023-04-20 RX ORDER — OXYMETAZOLINE HCL 0.05 %
1 SPRAY, NON-AEROSOL (ML) NASAL
Status: COMPLETED | OUTPATIENT
Start: 2023-04-20 | End: 2023-04-20

## 2023-04-20 RX ORDER — SILVER NITRATE 38.21; 12.74 MG/1; MG/1
2 STICK TOPICAL
Status: COMPLETED | OUTPATIENT
Start: 2023-04-20 | End: 2023-04-20

## 2023-04-20 RX ORDER — LIDOCAINE HYDROCHLORIDE 20 MG/ML
5 SOLUTION OROPHARYNGEAL
Status: COMPLETED | OUTPATIENT
Start: 2023-04-20 | End: 2023-04-20

## 2023-04-20 RX ORDER — ONDANSETRON 2 MG/ML
4 INJECTION INTRAMUSCULAR; INTRAVENOUS
Status: COMPLETED | OUTPATIENT
Start: 2023-04-20 | End: 2023-04-20

## 2023-04-20 RX ORDER — ACETAMINOPHEN 500 MG
1000 TABLET ORAL
Status: COMPLETED | OUTPATIENT
Start: 2023-04-20 | End: 2023-04-20

## 2023-04-20 RX ORDER — METOPROLOL TARTRATE 25 MG/1
25 TABLET, FILM COATED ORAL
Status: COMPLETED | OUTPATIENT
Start: 2023-04-20 | End: 2023-04-20

## 2023-04-20 RX ORDER — METOPROLOL TARTRATE 25 MG/1
25 TABLET, FILM COATED ORAL 2 TIMES DAILY
Qty: 60 TABLET | Refills: 0 | Status: SHIPPED | OUTPATIENT
Start: 2023-04-20 | End: 2024-04-19

## 2023-04-20 RX ADMIN — METOPROLOL TARTRATE 25 MG: 25 TABLET, FILM COATED ORAL at 10:04

## 2023-04-20 RX ADMIN — SILVER NITRATE APPLICATORS 2 APPLICATOR: 25; 75 STICK TOPICAL at 09:04

## 2023-04-20 RX ADMIN — ONDANSETRON 4 MG: 2 INJECTION INTRAMUSCULAR; INTRAVENOUS at 11:04

## 2023-04-20 RX ADMIN — ACETAMINOPHEN 1000 MG: 500 TABLET ORAL at 10:04

## 2023-04-20 RX ADMIN — OXYMETAZOLINE HCL 1 SPRAY: 0.05 SPRAY NASAL at 08:04

## 2023-04-20 RX ADMIN — LIDOCAINE HYDROCHLORIDE 5 ML: 20 SOLUTION ORAL; TOPICAL at 09:04

## 2023-04-21 NOTE — ED TRIAGE NOTES
Lesly Jacob, a 77 y.o. female presents to the ED w/ complaint of 2 episodes of epistaxis today, last one was just before arrival to hospital. Pt reports feeling weak and having headache.No active bleeding    Triage note:  Chief Complaint   Patient presents with    Epistaxis     2 episodes of epistaxis, lasting approx. 1 hour. Pt reports feeling weak now. No active bleeding at this time      Review of patient's allergies indicates:   Allergen Reactions    Amoxicillin Nausea And Vomiting     Past Medical History:   Diagnosis Date    Bronchitis     Diabetes mellitus     Hyperlipidemia     Hypertension     Pneumonia     Pneumonia

## 2023-04-21 NOTE — ED NOTES
Assumed care of pt. Pt ambulatory with steady gait. No active nosebleed noted at this time.  Visitor at pt's side.

## 2023-04-21 NOTE — FIRST PROVIDER EVALUATION
Emergency Department TeleTriage Encounter Note      CHIEF COMPLAINT    Chief Complaint   Patient presents with    Epistaxis     2 episodes of epistaxis, lasting approx. 1 hour. Pt reports feeling weak now. No active bleeding at this time        VITAL SIGNS   Initial Vitals [04/20/23 2039]   BP Pulse Resp Temp SpO2   (!) 202/83 76 15 97.6 °F (36.4 °C) 99 %      MAP       --            ALLERGIES    Review of patient's allergies indicates:   Allergen Reactions    Amoxicillin Nausea And Vomiting       PROVIDER TRIAGE NOTE  76 yo F to the ED with spontaneous epistaxis this evening that has resolved now. Vitals show elevated blood pressure.  Otherwise nondistressed.      ORDERS  Labs Reviewed - No data to display    ED Orders (720h ago, onward)      Start Ordered     Status Ordering Provider    04/20/23 2100 04/20/23 2050  oxymetazoline 0.05 % nasal spray 1 spray  ED 1 Time         Ordered WHITLEY WOODY              Virtual Visit Note: The provider triage portion of this emergency department evaluation and documentation was performed via [employer], a HIPAA-compliant telemedicine application, in concert with a tele-presenter in the room. A face to face patient evaluation with one of my colleagues will occur once the patient is placed in an emergency department room.      DISCLAIMER: This note was prepared with [employer] voice recognition transcription software. Garbled syntax, mangled pronouns, and other bizarre constructions may be attributed to that software system.

## 2023-04-21 NOTE — ED PROVIDER NOTES
Encounter Date: 4/20/2023    SCRIBE #1 NOTE: I, Antonia Redding, am scribing for, and in the presence of,  Dallas Lilly MD. I have scribed the following portions of the note - Other sections scribed: HPI.     History     Chief Complaint   Patient presents with    Epistaxis     2 episodes of epistaxis, lasting approx. 1 hour. Pt reports feeling weak now. No active bleeding at this time      Lesly Jacob is a 77 y.o. female with a PMHx of hypertension,hyperlipidemia, diabetes mellitus, who presents to the ED c/o x 2 episodes of left sided epistaxis today. Patient reports having an episode earlier this morning described a runny nose but with blood. States she had another episode at her daughters house and believes she lost 2 pints of blood. Of note, pt has had 2 nose cauterizations in the past for similar symptoms. Per triage she notes feeling weak. No active bleeding noted at this time.       The history is provided by the patient and medical records. No  was used.   Review of patient's allergies indicates:   Allergen Reactions    Amoxicillin Nausea And Vomiting     Past Medical History:   Diagnosis Date    Bronchitis     Diabetes mellitus     Hyperlipidemia     Hypertension     Pneumonia     Pneumonia      Past Surgical History:   Procedure Laterality Date    HYSTERECTOMY       Family History   Problem Relation Age of Onset    Heart failure Mother     Hypertension Mother     Heart attack Brother      Social History     Tobacco Use    Smoking status: Never    Smokeless tobacco: Never   Substance Use Topics    Alcohol use: No    Drug use: No     Review of Systems    Physical Exam     Initial Vitals [04/20/23 2039]   BP Pulse Resp Temp SpO2   (!) 202/83 76 15 97.6 °F (36.4 °C) 99 %      MAP       --         Physical Exam    Nursing note and vitals reviewed.  Constitutional: She appears well-developed and well-nourished. She is not diaphoretic. No distress.   HENT:   Head: Normocephalic and  atraumatic.   No active bleeding from the near.  There is a small hemangioma to the septum of the left near   Eyes: Conjunctivae are normal.   Neck: Neck supple. No tracheal deviation present. No JVD present.   Normal range of motion.  Cardiovascular:  Normal rate, regular rhythm, normal heart sounds and intact distal pulses.           Pulmonary/Chest: Breath sounds normal. No respiratory distress. She has no wheezes. She has no rhonchi. She has no rales.   Abdominal: Abdomen is soft. Bowel sounds are normal. She exhibits no distension. There is no abdominal tenderness. There is no rebound.   Musculoskeletal:         General: No edema.      Cervical back: Normal range of motion and neck supple.     Neurological: She is alert. She has normal strength. No sensory deficit. GCS score is 15. GCS eye subscore is 4. GCS verbal subscore is 5. GCS motor subscore is 6.   Skin: Skin is warm. No rash noted.   Psychiatric: She has a normal mood and affect.       ED Course   Epistaxis Mgmt    Date/Time: 4/21/2023 12:35 AM  Performed by: Dallas Lilly MD  Authorized by: Dallas Lilly MD     Anesthesia:  Local anesthetic: Viscous lidocaine was applied to the nasal septum.  Treatment site: left anterior  Repair method: silver nitrate  Post-procedure assessment: bleeding stopped  Treatment complexity: simple      Labs Reviewed   CBC W/ AUTO DIFFERENTIAL - Abnormal; Notable for the following components:       Result Value    MCHC 31.8 (*)     MPV 8.9 (*)     All other components within normal limits          Imaging Results    None          Medications   oxymetazoline 0.05 % nasal spray 1 spray (1 spray Each Nostril Given 4/20/23 2055)   LIDOcaine HCl 2% oral solution 5 mL (5 mLs Oral Given by Other 4/20/23 2130)   silver nitrate applicators applicator 2 applicator (2 applicators Topical (Top) Given by Provider 4/20/23 2130)   metoprolol tartrate (LOPRESSOR) tablet 25 mg (25 mg Oral Given 4/20/23 2203)   acetaminophen tablet  1,000 mg (1,000 mg Oral Given 4/20/23 2202)   ondansetron injection 4 mg (4 mg Intravenous Given 4/20/23 7729)     Medical Decision Making:   History:   Old Medical Records: I decided to obtain old medical records.  Initial Assessment:   Patient with recurrent epistaxis.  Area was cauterized and observed.  No further epistaxis.    Patient with hypertension.  She states she has not been taking the Coreg because she does not like the way it makes her feel.  Her blood pressures been running high for some time.  Gave dose of Lopressor p.o..  Blood pressure trended down nicely and she states she feels much better.  Will prescribe low-dose Lopressor.  Recommend she follow-up with her PCP for blood pressure management.        Scribe Attestation:   Scribe #1: I performed the above scribed service and the documentation accurately describes the services I performed. I attest to the accuracy of the note.                   Clinical Impression:   Final diagnoses:  [R04.0] Epistaxis (Primary)  [I10] Hypertension, unspecified type        ED Disposition Condition    Discharge Stable          ED Prescriptions       Medication Sig Dispense Start Date End Date Auth. Provider    metoprolol tartrate (LOPRESSOR) 25 MG tablet Take 1 tablet (25 mg total) by mouth 2 (two) times daily. 60 tablet 4/20/2023 4/19/2024 Dallas Lilly MD          Follow-up Information       Follow up With Specialties Details Why Contact Info    Paige Hawkins MD Cardiology Call in 1 day regarding blood pressures [address]  [facility] [id number]  [phone]               Dallas Lilly MD  04/21/23 0037

## 2023-07-06 ENCOUNTER — TELEPHONE (OUTPATIENT)
Dept: OPTOMETRY | Facility: CLINIC | Age: 78
End: 2023-07-06
Payer: MEDICARE

## 2023-08-18 ENCOUNTER — PES CALL (OUTPATIENT)
Dept: ADMINISTRATIVE | Facility: CLINIC | Age: 78
End: 2023-08-18
Payer: MEDICARE

## 2023-09-21 ENCOUNTER — HOSPITAL ENCOUNTER (EMERGENCY)
Facility: HOSPITAL | Age: 78
Discharge: HOME | End: 2023-09-21
Attending: EMERGENCY MEDICINE
Payer: MEDICARE

## 2023-09-21 VITALS
HEIGHT: 66 IN | HEART RATE: 61 BPM | OXYGEN SATURATION: 98 % | RESPIRATION RATE: 18 BRPM | BODY MASS INDEX: 28.93 KG/M2 | TEMPERATURE: 99 F | DIASTOLIC BLOOD PRESSURE: 80 MMHG | SYSTOLIC BLOOD PRESSURE: 148 MMHG | WEIGHT: 180 LBS

## 2023-09-21 DIAGNOSIS — M54.40 ACUTE RIGHT-SIDED LOW BACK PAIN WITH SCIATICA, SCIATICA LATERALITY UNSPECIFIED: Primary | ICD-10-CM

## 2023-09-21 PROCEDURE — 99284 EMERGENCY DEPT VISIT MOD MDM: CPT

## 2023-09-21 PROCEDURE — 63600175 PHARM REV CODE 636 W HCPCS

## 2023-09-21 PROCEDURE — 96372 THER/PROPH/DIAG INJ SC/IM: CPT

## 2023-09-21 PROCEDURE — 25000003 PHARM REV CODE 250

## 2023-09-21 RX ORDER — HYDROCODONE BITARTRATE AND ACETAMINOPHEN 5; 325 MG/1; MG/1
1 TABLET ORAL
Status: COMPLETED | OUTPATIENT
Start: 2023-09-21 | End: 2023-09-21

## 2023-09-21 RX ORDER — KETOROLAC TROMETHAMINE 30 MG/ML
10 INJECTION, SOLUTION INTRAMUSCULAR; INTRAVENOUS
Status: DISCONTINUED | OUTPATIENT
Start: 2023-09-21 | End: 2023-09-21

## 2023-09-21 RX ORDER — LIDOCAINE 50 MG/G
1 PATCH TOPICAL DAILY
Qty: 30 PATCH | Refills: 0 | Status: SHIPPED | OUTPATIENT
Start: 2023-09-21

## 2023-09-21 RX ORDER — KETOROLAC TROMETHAMINE 30 MG/ML
15 INJECTION, SOLUTION INTRAMUSCULAR; INTRAVENOUS
Status: COMPLETED | OUTPATIENT
Start: 2023-09-21 | End: 2023-09-21

## 2023-09-21 RX ORDER — TIZANIDINE 4 MG/1
4 TABLET ORAL EVERY 6 HOURS PRN
Qty: 30 TABLET | Refills: 0 | Status: SHIPPED | OUTPATIENT
Start: 2023-09-21 | End: 2023-10-01

## 2023-09-21 RX ADMIN — HYDROCODONE BITARTRATE AND ACETAMINOPHEN 1 TABLET: 5; 325 TABLET ORAL at 09:09

## 2023-09-21 RX ADMIN — KETOROLAC TROMETHAMINE 15 MG: 30 INJECTION, SOLUTION INTRAMUSCULAR; INTRAVENOUS at 08:09

## 2023-09-22 NOTE — DISCHARGE INSTRUCTIONS
Thank you for coming to the emergency department today.  We evaluated you for your low back pain.  We suspect that this is from degenerative changes in your low back as demonstrated on x-ray.  We will refer you to the spine clinic for further management.    -you can take Tylenol 1000 mg every 6 hours, I prescribed a lidocaine patch they can apply  -you can also ice or place heat packs for 20 minutes at a time a couple times a day.

## 2023-09-22 NOTE — ED PROVIDER NOTES
"Encounter Date: 9/21/2023       History     Chief Complaint   Patient presents with    Back Pain     Pt states "las night I got a shooting pain clean across my back"  pt took Tylenol without relief.     Patient is a 77-year-old female with past medical history of diabetes, hyperlipidemia, hypertension that presents to emergency department with back pain that presented 2 days ago.  Patient states that she was at rest when she began to have worsening back pain with radiation to both legs.  Patient has more symptoms in her right leg than her left however it is in both legs.  Patient denies any urinary symptoms, such as incontinence or increased frequency or burning with urination.  Patient also denies any numbness in her legs.  States that she was able to walk with pain.  Patient tried Tylenol at home without any significant relief and was brought here by her daughter today for evaluation.    Patient asked and denies any other symptoms at this time    The history is provided by the patient and medical records.     Review of patient's allergies indicates:   Allergen Reactions    Amoxicillin Nausea And Vomiting     Past Medical History:   Diagnosis Date    Bronchitis     Diabetes mellitus     Hyperlipidemia     Hypertension     Pneumonia     Pneumonia      Past Surgical History:   Procedure Laterality Date    HYSTERECTOMY       Family History   Problem Relation Age of Onset    Heart failure Mother     Hypertension Mother     Heart attack Brother      Social History     Tobacco Use    Smoking status: Never    Smokeless tobacco: Never   Substance Use Topics    Alcohol use: No    Drug use: No     Review of Systems  ROS negative except as noted in HPI     Physical Exam     Initial Vitals [09/21/23 1810]   BP Pulse Resp Temp SpO2   (!) 142/65 64 20 98.8 °F (37.1 °C) 96 %      MAP       --         Physical Exam    Constitutional: She appears well-developed and well-nourished.   HENT:   Head: Normocephalic and atraumatic. "   Right Ear: External ear normal.   Left Ear: External ear normal.   Mouth/Throat: Oropharynx is clear and moist.   Eyes: EOM are normal. Pupils are equal, round, and reactive to light.   Cardiovascular:  Normal rate, regular rhythm, normal heart sounds and intact distal pulses.           Pulmonary/Chest: Breath sounds normal.   Abdominal: Abdomen is soft. She exhibits no distension. There is no abdominal tenderness. There is no rebound.   Musculoskeletal:      Cervical back: Normal.      Thoracic back: Normal.      Lumbar back: Bony tenderness present. No swelling, edema, deformity or signs of trauma. Positive right straight leg raise test and positive left straight leg raise test.     Neurological: She is alert and oriented to person, place, and time. She has normal strength. No cranial nerve deficit or sensory deficit.   Skin: Skin is warm and dry. Capillary refill takes less than 2 seconds.   Psychiatric: She has a normal mood and affect.           ED Course   Procedures  Labs Reviewed - No data to display         Imaging Results              X-Ray Lumbar Spine Ap And Lateral (Final result)  Result time 09/21/23 22:10:01      Final result by Alcon Douglass MD (09/21/23 22:10:01)                   Impression:      No acute lumbar fracture.    Degenerative disc and facet changes in the lower lumbar spine.  Grade 1 spondylolisthesis L3 on L4, grade 2 spondylolisthesis L4 on L5 and grade 1 spondylolisthesis L5 on S1.      Electronically signed by: Alcon Douglass MD  Date:    09/21/2023  Time:    22:10               Narrative:    EXAMINATION:  XR LUMBAR SPINE AP AND LATERAL    CLINICAL HISTORY:  Pain;    TECHNIQUE:  AP, lateral and spot images were performed of the lumbar spine.    COMPARISON:  None    FINDINGS:  Alignment: Grade 1 spondylolisthesis L3 on L4, grade 2 spondylolisthesis L4 on L5, grade 1 spondylolisthesis L5 on S1.Mild levocurvature.    Vertebrae: Vertebral body heights are maintained.    Discs  and facets: Multilevel disc space narrowing L3-4, L4-5 and L5-S1. Degenerative facet changes present at the lower lumbar levels.    Miscellaneous: Vascular calcifications in the aorta.                                    X-Rays:   Independently Interpreted Readings:   Chest X-Ray: There is anterior lithiasis of the L4-L5 joint, mild degenerative changes throughout lumbar spine     Medications   ketorolac injection 15 mg (15 mg Intramuscular Given 9/21/23 2016)   HYDROcodone-acetaminophen 5-325 mg per tablet 1 tablet (1 tablet Oral Given 9/21/23 2125)     Medical Decision Making  Patient is a 77-year-old female with past medical history of diabetes, hyperlipidemia, hypertension that presents to emergency department with back pain that presented 2 days ago.    Given presentation of back pain, we will consider emergent pathology such as cauda equina, epidural abscess or spinal abscess.  Clinical presentation not consistent with these emergent pathologies as patient has strength, no numbness, no urinary incontinence or saddle anesthesia.  We will obtain x-ray as patient has never had x-ray for these symptoms.  However on CT abdomen and pelvis in 2022 there is demonstration of anterolisthesis in the lumbar spine.    Patient provided with pain relief with IV and Toradol and Lindrith in the emergency department.    Patient stable for discharge at this time.  We will refer patient to spine clinic as well as instruct patient with supportive treatment instructions for her back pain.  Including Tylenol, lidocaine patches and rest with ice.    Amount and/or Complexity of Data Reviewed  External Data Reviewed: labs, radiology, ECG and notes.     Details: Anterior listhesis of the lumbar spine L4-L5 demonstrated on CT scan done in 2022  Radiology: ordered.    Risk  Prescription drug management.                               Clinical Impression:   Final diagnoses:  [M54.40] Acute right-sided low back pain with sciatica, sciatica  laterality unspecified (Primary)        ED Disposition Condition    Discharge Stable          ED Prescriptions    None       Follow-up Information       Follow up With Specialties Details Why Contact Info    Paige Hawkins MD Cardiology In 1 week  [address]  [facility] [address]  [phone]               Phil Bazan, DO  Resident  09/21/23 4899

## 2023-09-22 NOTE — ED TRIAGE NOTES
"Lesly Jacob, a 77 y.o. female presents to the ED w/ complaint of back pain. Patient states lastnight she had a shooting pain start across her back. Patient denies C/P, SOB,N/V/D. No difficulty urinating or having bowel movement.    Triage note:  Chief Complaint   Patient presents with    Back Pain     Pt states "las night I got a shooting pain clean across my back"  pt took Tylenol without relief.     Review of patient's allergies indicates:   Allergen Reactions    Amoxicillin Nausea And Vomiting     Past Medical History:   Diagnosis Date    Bronchitis     Diabetes mellitus     Hyperlipidemia     Hypertension     Pneumonia     Pneumonia        "

## 2023-10-10 ENCOUNTER — OFFICE VISIT (OUTPATIENT)
Dept: HOME HEALTH SERVICES | Facility: CLINIC | Age: 78
End: 2023-10-10
Payer: MEDICARE

## 2023-10-10 VITALS
HEART RATE: 55 BPM | TEMPERATURE: 97 F | DIASTOLIC BLOOD PRESSURE: 70 MMHG | OXYGEN SATURATION: 99 % | SYSTOLIC BLOOD PRESSURE: 130 MMHG | HEIGHT: 66 IN | WEIGHT: 180 LBS | BODY MASS INDEX: 28.93 KG/M2 | RESPIRATION RATE: 16 BRPM

## 2023-10-10 DIAGNOSIS — E11.9 NO HISTORY OF DIABETIC RETINOPATHY IN PAST YEAR: ICD-10-CM

## 2023-10-10 DIAGNOSIS — E11.9 TYPE 2 DIABETES MELLITUS WITHOUT COMPLICATION, WITHOUT LONG-TERM CURRENT USE OF INSULIN: ICD-10-CM

## 2023-10-10 DIAGNOSIS — Z00.00 ENCOUNTER FOR PREVENTIVE HEALTH EXAMINATION: Primary | ICD-10-CM

## 2023-10-10 DIAGNOSIS — E78.5 HYPERLIPIDEMIA ASSOCIATED WITH TYPE 2 DIABETES MELLITUS: ICD-10-CM

## 2023-10-10 DIAGNOSIS — I15.2 HYPERTENSION ASSOCIATED WITH DIABETES: ICD-10-CM

## 2023-10-10 DIAGNOSIS — J84.10 CALCIFIED GRANULOMA OF LUNG: ICD-10-CM

## 2023-10-10 DIAGNOSIS — E11.618 DIABETIC CHEIRARTHROPATHY: ICD-10-CM

## 2023-10-10 DIAGNOSIS — E11.59 HYPERTENSION ASSOCIATED WITH DIABETES: ICD-10-CM

## 2023-10-10 DIAGNOSIS — E11.69 HYPERLIPIDEMIA ASSOCIATED WITH TYPE 2 DIABETES MELLITUS: ICD-10-CM

## 2023-10-10 PROBLEM — J98.4 CALCIFIED GRANULOMA OF LUNG: Status: ACTIVE | Noted: 2023-10-10

## 2023-10-10 PROCEDURE — 1101F PT FALLS ASSESS-DOCD LE1/YR: CPT | Mod: CPTII,S$GLB,,

## 2023-10-10 PROCEDURE — 3288F FALL RISK ASSESSMENT DOCD: CPT | Mod: CPTII,S$GLB,,

## 2023-10-10 PROCEDURE — 1101F PR PT FALLS ASSESS DOC 0-1 FALLS W/OUT INJ PAST YR: ICD-10-PCS | Mod: CPTII,S$GLB,,

## 2023-10-10 PROCEDURE — 1159F MED LIST DOCD IN RCRD: CPT | Mod: CPTII,S$GLB,,

## 2023-10-10 PROCEDURE — 1160F PR REVIEW ALL MEDS BY PRESCRIBER/CLIN PHARMACIST DOCUMENTED: ICD-10-PCS | Mod: CPTII,S$GLB,,

## 2023-10-10 PROCEDURE — 3288F PR FALLS RISK ASSESSMENT DOCUMENTED: ICD-10-PCS | Mod: CPTII,S$GLB,,

## 2023-10-10 PROCEDURE — 3078F DIAST BP <80 MM HG: CPT | Mod: CPTII,S$GLB,,

## 2023-10-10 PROCEDURE — 1126F AMNT PAIN NOTED NONE PRSNT: CPT | Mod: CPTII,S$GLB,,

## 2023-10-10 PROCEDURE — 1160F RVW MEDS BY RX/DR IN RCRD: CPT | Mod: CPTII,S$GLB,,

## 2023-10-10 PROCEDURE — 1126F PR PAIN SEVERITY QUANTIFIED, NO PAIN PRESENT: ICD-10-PCS | Mod: CPTII,S$GLB,,

## 2023-10-10 PROCEDURE — G0439 PR MEDICARE ANNUAL WELLNESS SUBSEQUENT VISIT: ICD-10-PCS | Mod: S$GLB,,,

## 2023-10-10 PROCEDURE — 1159F PR MEDICATION LIST DOCUMENTED IN MEDICAL RECORD: ICD-10-PCS | Mod: CPTII,S$GLB,,

## 2023-10-10 PROCEDURE — G0439 PPPS, SUBSEQ VISIT: HCPCS | Mod: S$GLB,,,

## 2023-10-10 PROCEDURE — 3075F PR MOST RECENT SYSTOLIC BLOOD PRESS GE 130-139MM HG: ICD-10-PCS | Mod: CPTII,S$GLB,,

## 2023-10-10 PROCEDURE — 1170F FXNL STATUS ASSESSED: CPT | Mod: CPTII,S$GLB,,

## 2023-10-10 PROCEDURE — 3078F PR MOST RECENT DIASTOLIC BLOOD PRESSURE < 80 MM HG: ICD-10-PCS | Mod: CPTII,S$GLB,,

## 2023-10-10 PROCEDURE — 1170F PR FUNCTIONAL STATUS ASSESSED: ICD-10-PCS | Mod: CPTII,S$GLB,,

## 2023-10-10 PROCEDURE — 3075F SYST BP GE 130 - 139MM HG: CPT | Mod: CPTII,S$GLB,,

## 2023-10-10 NOTE — PATIENT INSTRUCTIONS
Counseling and Referral of Other Preventative  (Italic type indicates deductible and co-insurance are waived)    Patient Name: Lesly Jacob  Today's Date: 10/10/2023    Health Maintenance       Date Due Completion Date    Hepatitis C Screening Never done ---    Pneumococcal Vaccines (Age 65+) (1 - PCV) Never done ---    TETANUS VACCINE Never done ---    DEXA Scan Never done ---    Shingles Vaccine (1 of 2) Never done ---    COVID-19 Vaccine (4 - 2023-24 season) 09/01/2023 11/16/2021    Lipid Panel 04/24/2028 4/24/2023        No orders of the defined types were placed in this encounter.    The following information is provided to all patients.  This information is to help you find resources for any of the problems found today that may be affecting your health:                Living healthy guide: www.[facility].[email or web address].[email or web address]      Understanding Diabetes: www.diabetes.org      Eating healthy: www.cdc.gov/healthyweight      [facility] home safety checklist: www.cdc.gov/steadi/patient.html      Agency on Aging: www.goea.[place].[facility]      Alcoholics anonymous (AA): www.aa.org      Physical Activity: www.nori.nih.gov/or1ynag      Tobacco use: www.quitwithusla.org

## 2023-10-10 NOTE — PROGRESS NOTES
"Lesly Jacob presented for a  Medicare AWV and comprehensive Health Risk Assessment today. The following components were reviewed and updated:    Medical history  Family History  Social history  Allergies and Current Medications  Health Risk Assessment  Health Maintenance  Care Team         ** See Completed Assessments for Annual Wellness Visit within the encounter summary.**         The following assessments were completed:  Living Situation  CAGE  Depression Screening  Timed Get Up and Go  Whisper Test  Cognitive Function Screening    Nutrition Screening  ADL Screening  PAQ Screening        Vitals:    10/10/23 0942   BP: 130/70   BP Location: Left arm   Patient Position: Sitting   Pulse: (!) 55   Resp: 16   Temp: 97 °F (36.1 °C)   SpO2: 99%   Weight: 81.6 kg (180 lb)   Height: 5' 6" (1.676 m)     Body mass index is 29.05 kg/m².    Physical Exam  Vitals reviewed.   Constitutional:       General: She is not in acute distress.     Appearance: Normal appearance.   Cardiovascular:      Rate and Rhythm: Normal rate and regular rhythm.      Pulses: Normal pulses.      Heart sounds: No murmur heard.  Pulmonary:      Effort: Pulmonary effort is normal. No respiratory distress.      Breath sounds: Normal breath sounds.   Abdominal:      General: Bowel sounds are normal.   Musculoskeletal:      Right lower leg: No edema.      Left lower leg: No edema.   Neurological:      General: No focal deficit present.      Mental Status: She is alert and oriented to person, place, and time.   Psychiatric:         Mood and Affect: Mood normal.         Behavior: Behavior normal.               Diagnoses and health risks identified today and associated recommendations/orders:    1. Encounter for preventive health examination  Assessments completed.  HM recommendations reviewed.  F/u with PCP as instructed.     Patient not on chronic opioids.   Risk factors reviewed for any potential opioid use disorder   Pain evaluated during " visit.  Current treatment plan documented.  Will refer to specialist, as appropriate.      2. Calcified granuloma of lung -- CXR 2014  Chronic, stable on current regimen. Followed by PCP.     3. Type 2 diabetes mellitus without complication, without long-term current use of insulin  Chronic, stable on current Metformin regimen. Followed by PCP.     4. Diabetic cheirarthropathy  Chronic, stable on current regimen. Followed by PCP.     5. Hypertension associated with diabetes  Chronic, stable on current Hyzaar, Metoprolol regimen. Followed by PCP.     6. Hyperlipidemia associated with type 2 diabetes mellitus  Chronic, stable on current statin regimen. Followed by PCP.     7. No history of diabetic retinopathy in past year  Chronic, stable on current regimen. Followed by Ophthalmology.       Provided [place] with a 5-10 year written screening schedule and personal prevention plan. Recommendations were developed using the USPSTF age appropriate recommendations. Education, counseling, and referrals were provided as needed. After Visit Summary printed and given to patient which includes a list of additional screenings\tests needed.    Follow up in about 1 year (around 10/10/2024) for Medicare AWV.    Caity Bhagat NP    I offered to discuss advanced care planning, including how to pick a person who would make decisions for you if you were unable to make them for yourself, called a health care power of , and what kind of decisions you might make such as use of life sustaining treatments such as ventilators and tube feeding when faced with a life limiting illness recorded on a living will that they will need to know. (How you want to be cared for as you near the end of your natural life)     X Patient is interested in learning more about how to make advanced directives.  I provided them paperwork and offered to discuss this with them.

## 2024-02-20 ENCOUNTER — HOSPITAL ENCOUNTER (EMERGENCY)
Facility: HOSPITAL | Age: 79
Discharge: HOME | End: 2024-02-20
Attending: EMERGENCY MEDICINE
Payer: MEDICARE

## 2024-02-20 ENCOUNTER — TELEPHONE (OUTPATIENT)
Dept: OPHTHALMOLOGY | Facility: CLINIC | Age: 79
End: 2024-02-20
Payer: MEDICARE

## 2024-02-20 VITALS
BODY MASS INDEX: 28.25 KG/M2 | DIASTOLIC BLOOD PRESSURE: 81 MMHG | HEIGHT: 67 IN | WEIGHT: 180 LBS | SYSTOLIC BLOOD PRESSURE: 168 MMHG | OXYGEN SATURATION: 96 % | RESPIRATION RATE: 16 BRPM | TEMPERATURE: 98 F | HEART RATE: 66 BPM

## 2024-02-20 DIAGNOSIS — M79.603 ARM PAIN: ICD-10-CM

## 2024-02-20 LAB
OHS QRS DURATION: 88 MS
OHS QTC CALCULATION: 447 MS

## 2024-02-20 PROCEDURE — 25000003 PHARM REV CODE 250

## 2024-02-20 PROCEDURE — 99285 EMERGENCY DEPT VISIT HI MDM: CPT | Mod: 25

## 2024-02-20 PROCEDURE — 96372 THER/PROPH/DIAG INJ SC/IM: CPT

## 2024-02-20 PROCEDURE — 93005 ELECTROCARDIOGRAM TRACING: CPT

## 2024-02-20 PROCEDURE — 63600175 PHARM REV CODE 636 W HCPCS

## 2024-02-20 PROCEDURE — 93010 ELECTROCARDIOGRAM REPORT: CPT | Mod: ,,, | Performed by: INTERNAL MEDICINE

## 2024-02-20 RX ORDER — CYCLOBENZAPRINE HCL 5 MG
10 TABLET ORAL
Status: COMPLETED | OUTPATIENT
Start: 2024-02-20 | End: 2024-02-20

## 2024-02-20 RX ORDER — KETOROLAC TROMETHAMINE 30 MG/ML
60 INJECTION, SOLUTION INTRAMUSCULAR; INTRAVENOUS
Status: COMPLETED | OUTPATIENT
Start: 2024-02-20 | End: 2024-02-20

## 2024-02-20 RX ORDER — KETOROLAC TROMETHAMINE 30 MG/ML
15 INJECTION, SOLUTION INTRAMUSCULAR; INTRAVENOUS
Status: DISCONTINUED | OUTPATIENT
Start: 2024-02-20 | End: 2024-02-20

## 2024-02-20 RX ORDER — METOPROLOL TARTRATE 25 MG/1
25 TABLET, FILM COATED ORAL ONCE
Status: COMPLETED | OUTPATIENT
Start: 2024-02-20 | End: 2024-02-20

## 2024-02-20 RX ORDER — LOSARTAN POTASSIUM AND HYDROCHLOROTHIAZIDE 25; 100 MG/1; MG/1
1 TABLET ORAL DAILY
Status: DISCONTINUED | OUTPATIENT
Start: 2024-02-20 | End: 2024-02-20 | Stop reason: HOSPADM

## 2024-02-20 RX ORDER — CYCLOBENZAPRINE HCL 10 MG
10 TABLET ORAL 3 TIMES DAILY PRN
Qty: 30 TABLET | Refills: 0 | Status: SHIPPED | OUTPATIENT
Start: 2024-02-20 | End: 2024-03-01

## 2024-02-20 RX ORDER — NAPROXEN 500 MG/1
500 TABLET ORAL 2 TIMES DAILY WITH MEALS
Qty: 20 TABLET | Refills: 0 | OUTPATIENT
Start: 2024-02-20 | End: 2024-06-15

## 2024-02-20 RX ORDER — AMLODIPINE BESYLATE 10 MG/1
10 TABLET ORAL
Status: COMPLETED | OUTPATIENT
Start: 2024-02-20 | End: 2024-02-20

## 2024-02-20 RX ADMIN — AMLODIPINE BESYLATE 10 MG: 10 TABLET ORAL at 09:02

## 2024-02-20 RX ADMIN — METOPROLOL TARTRATE 25 MG: 25 TABLET, FILM COATED ORAL at 09:02

## 2024-02-20 RX ADMIN — LOSARTAN POTASSIUM AND HYDROCHLOROTHIAZIDE 1 TABLET: 100; 25 TABLET, FILM COATED ORAL at 11:02

## 2024-02-20 RX ADMIN — KETOROLAC TROMETHAMINE 60 MG: 30 INJECTION, SOLUTION INTRAMUSCULAR; INTRAVENOUS at 09:02

## 2024-02-20 RX ADMIN — CYCLOBENZAPRINE HYDROCHLORIDE 10 MG: 5 TABLET, FILM COATED ORAL at 09:02

## 2024-02-20 NOTE — PLAN OF CARE
02/20/24 1153   Discharge Plan   Discharge Plan A Community Services;Home with family   Discharge Plan B Home with family     SW reached out to ophthalmology scheduling.  They will contact pt to schedule and will attempt to see her today.    No other discharge needs identified at this time.    Discharge Plan A and Plan B have been determined by review of patient's clinical status, future medical and therapeutic needs, and coverage/benefits for post-acute care in coordination with multidisciplinary team members.     Winnie Keenan LMSW  Case Management [facility]  [id number]

## 2024-02-20 NOTE — ED NOTES
Patient states pain to right shoulder, right arm slowly onset last night Denies injury, no OTC meds

## 2024-02-20 NOTE — TELEPHONE ENCOUNTER
L/M in regards to triage message. # in the message didn't work.  (Constant eye pain (level 10 out of 10)

## 2024-02-20 NOTE — TELEPHONE ENCOUNTER
"----- Message from Julio Rudolph sent at 2/20/2024 11:46 AM CST -----  Scheduling Request        Patient Status: Np      Scheduling Appt: Constant eye pain (level 10 out of 10)      Time/Date Preference: Today or tomorrow      Relationship to Patient?: Felecia Jacob (Daughter)      Contact Preference?: [phone]       Do you feel you need to be seen today? Yes      Additional Notes:  "Thank you for all that you do for our patients"       "

## 2024-02-20 NOTE — ED NOTES
Patient identifiers verified and correct for Ms Jacob  C/C: Right shoulder pain SEE NN  APPEARANCE: awake and alert in NAD. PAIN  10/10  SKIN: warm, dry and intact. No breakdown or bruising.  MUSCULOSKELETAL: Patient moving all extremities spontaneously, no obvious swelling or deformities noted. Ambulates independently.  RESPIRATORY: Denies shortness of breath.Respirations unlabored.   CARDIAC: Denies CP, 2+ distal pulses; no peripheral edema  ABDOMEN: S/ND/NT, Denies nausea  : voids spontaneously, denies difficulty  Neurologic: AAO x 4; follows commands equal strength in all extremities; denies numbness/tingling. Denies dizziness Denies new wekaness, severe pain with mvmt to right arm

## 2024-02-20 NOTE — ED PROVIDER NOTES
Encounter Date: 2/20/2024       History     Chief Complaint   Patient presents with    Arm Pain     R side of neck , shoulder and arm pain with swelling to hand, ,      Ms. Lesly Jacob is a 78 year old female with a history of DM2, HLD, and HTN that presents with subacute onset of R shoulder and arm pain. She reports that starting the morning of the day prior to presentation, the patient had onset and progressive pain radiating from the R shoulder down the entirety of the R arm eventually becoming a 10/10. She describes it as a dull, throbbing pain without numbness, tingling, or burning. She has not been able to functionally use the R arm since, secondary to pain. She denies inciting event in the form of trauma or overuse. However, she does report lifting a heavy pot with her right arm the day prior to symptom onset. She also endorses swelling of the R hand, improved by the time that she made it to the ED.        Review of patient's allergies indicates:   Allergen Reactions    Amoxicillin Nausea And Vomiting     Past Medical History:   Diagnosis Date    Bronchitis     Diabetes mellitus     Hyperlipidemia     Hypertension     Pneumonia     Pneumonia      Past Surgical History:   Procedure Laterality Date    HYSTERECTOMY       Family History   Problem Relation Age of Onset    Heart failure Mother     Hypertension Mother     Heart attack Brother      Social History     Tobacco Use    Smoking status: Never    Smokeless tobacco: Never   Substance Use Topics    Alcohol use: No    Drug use: No     Review of Systems    Physical Exam     Initial Vitals [02/20/24 0744]   BP Pulse Resp Temp SpO2   (!) 203/83 82 18 97.6 °F (36.4 °C) 97 %      MAP       --         Physical Exam    Constitutional: She appears well-developed and well-nourished. She is not diaphoretic. She appears distressed.   HENT:   Head: Normocephalic and atraumatic.   Mouth/Throat: Oropharynx is clear and moist.   Eyes: Conjunctivae and EOM are normal.  Pupils are equal, round, and reactive to light. No scleral icterus.   Neck:   Normal range of motion.  Cardiovascular:  Normal rate, regular rhythm, normal heart sounds and intact distal pulses.           Pulmonary/Chest: Breath sounds normal. No respiratory distress.   Abdominal: Abdomen is soft. Bowel sounds are normal. She exhibits no distension. There is no abdominal tenderness.   Musculoskeletal:      Cervical back: Normal range of motion.      Comments: Tender to palpation along the trapezius and both anterior and posterior aspects of the rotator cuff  Unable to tolerate passive or active abduction of the R arm greater than 10 degrees  R arm, shoulder, elbow, and wrist appropriately warm to touch  Mild edema of the MCPs noted in the R hand  R arm is neurovascularly intact     Neurological: She is alert and oriented to person, place, and time. No cranial nerve deficit.   Strength:  R arm abduction 2/5 limited by pain  R bicep 5/5  R tricep 5/5  R  strength 4/5 limited by pain  Otherwise 5/5 throughout    Sensation:  Intact to light touch throughout R arm, R hand, and all other extremities   Skin: Skin is warm and dry. Capillary refill takes less than 2 seconds. No rash noted. No erythema.         ED Course   Procedures  Labs Reviewed - No data to display    EKG Readings: (Independently Interpreted)   Vent. rate 71 BPM, PA interval 164 ms, QRS duration 88 ms, QT/QTc 412/447 ms, NSR without ST segment or T wave changes concerning for acute ischemia     ECG Results              EKG 12-lead (Final result)        Collection Time Result Time QRS Duration OHS QTC Calculation    02/20/24 07:49:56 02/20/24 08:53:20 88 447                     Final result by Interface, Lab In [facility] (02/20/24 08:53:26)                   Narrative:    Test Reason : M79.603,    Vent. Rate : 071 BPM     Atrial Rate : 071 BPM     P-R Int : 164 ms          QRS Dur : 088 ms      QT Int : 412 ms       P-R-T Axes : 029 010 024 degrees      QTc Int : 447 ms    Normal sinus rhythm  Minimal voltage criteria for LVH, may be normal variant ( Chirag product )  Nonspecific T wave abnormality  Abnormal ECG  When compared with ECG of 05-FEB-2020 19:43,  Premature ventricular complexes are no longer Present  Premature atrial complexes are no longer Present  Confirmed by Laith KIM MD ([id number]) on 2/20/2024 8:53:19 AM    Referred By: [email or web address]   SELF           Confirmed By:Laith KIM MD                                  Imaging Results              US Upper Extremity Veins Right (Final result)  Result time 02/20/24 11:06:18      Final result by Sindi Williamson MD (02/20/24 11:06:18)                   Impression:      No thrombus in central veins of the right upper extremity.      Electronically signed by: Sindi Williamson MD  Date:    02/20/2024  Time:    11:06               Narrative:    EXAMINATION:  US UPPER EXTREMITY VEINS RIGHT    CLINICAL HISTORY:  right arm pain with swelling in hand;    TECHNIQUE:  Duplex and color flow Doppler evaluation and dynamic compression was performed of the right upper extremity veins.    COMPARISON:  None    FINDINGS:  Central veins: The internal jugular, subclavian, and axillary veins are patent and free of thrombus.    Arm veins: The brachial, basilic, and cephalic veins are patent and compressible.    Contralateral subclavian/internal jugular veins: The left subclavian and internal jugular veins are patent and free of thrombus.    Other findings: None.                                       X-Ray Shoulder Trauma Right (Final result)  Result time 02/20/24 09:41:54      Final result by Reji Segura MD (02/20/24 09:41:54)                   Impression:      1.  No evidence of a fracture or dislocation.    2.  Osteoarthrosis of the right shoulder.      Electronically signed by: Reji Segura MD  Date:    02/20/2024  Time:    09:41               Narrative:    EXAMINATION:  XR SHOULDER TRAUMA 3 VIEW RIGHT    CLINICAL  HISTORY:  Pain in arm, unspecified    TECHNIQUE:  Three or four views of the right shoulder were performed.    COMPARISON:  09/25/2019.    FINDINGS:  The bone mineralization is within normal limits.  There is no cortical step-off.  There is no evidence of periostitis.    There is narrowing of the glenohumeral joint.  There is narrowing of the AC joint with osteophytosis.  The coracoclavicular interval is within normal limits.    The visualized right hemithorax is unremarkable.  There is no evidence of a pneumothorax or pulmonary contusion.    There is no evidence of a fracture or dislocation.                                       Medications   ketorolac injection 60 mg (60 mg Intramuscular Given 2/20/24 0915)   cyclobenzaprine tablet 10 mg (10 mg Oral Given 2/20/24 0915)   amLODIPine tablet 10 mg (10 mg Oral Given 2/20/24 0944)   metoprolol tartrate (LOPRESSOR) tablet 25 mg (25 mg Oral Given 2/20/24 0944)     Medical Decision Making  This is a 78 year old female that presents with subacute onset of 10/10 R arm pain. Her symptoms and exam are most consistent with a musculoskeletal injury, however lack on inciting event is curious. Reported R arm swelling may be consistent with dependent edema in the setting of decreased use of the arm 2/2 pain. Will order Xray of R shoulder to rule out acute fracture and venous U/S of RUE to rule out DVT in the setting of unilateral arm swelling. Will provide Toradol IM and flexaril for pain control. Will also provide patient's home antihypertensives, as she did not take her morning doses and has a systolic in low 200s.    Differential includes: musculoskeletal injury, acute DVT of the RUE    Xray of the R shoulder unremarkable for acute fracture, instead noting chronic degenerative joint space narrowing with osteophytes. Venous U/S study unremarkable for venous occlusion. Symptoms most consistent with muscular/ligamental pain of the R shoulder. Patient's pain was responsive to  NSAID/muscle relaxer. Will discharge with 10 days of Naproxen and Flexaril, as well as a referral to outpatient physical/occupational therapy. Return precautions concerning worsening swelling, pain, or continued impairment of function given with patient expressing understanding.    Amount and/or Complexity of Data Reviewed  Labs: ordered.  Radiology: ordered. Decision-making details documented in ED Course.    Risk  Prescription drug management.              Attending Attestation:   Physician Attestation Statement for Resident:  As the supervising MD   Physician Attestation Statement: I have personally seen and examined this patient.   I agree with the above history.  -:   As the supervising MD I agree with the above PE.     As the supervising MD I agree with the above treatment, course, plan, and disposition.                    ED Course as of 02/21/24 1850   Tue Feb 20, 2024   0951 X-Ray Shoulder Trauma Right  No evidence of acute fracture. Evidence of chronic degenerative changes in the shoulder with joint space narrowing and osteophytes. [LS]      ED Course User Index  [LS] Ariel Drake MD                           Clinical Impression:  Final diagnoses:  [M79.603] Arm pain          ED Disposition Condition    Discharge           ED Prescriptions       Medication Sig Dispense Start Date End Date Auth. Provider    cyclobenzaprine (FLEXERIL) 10 MG tablet Take 1 tablet (10 mg total) by mouth 3 (three) times daily as needed for Muscle spasms. 30 tablet 2/20/2024 3/1/2024 Ariel Drake MD    naproxen (NAPROSYN) 500 MG tablet Take 1 tablet (500 mg total) by mouth 2 (two) times daily with meals. 20 tablet 2/20/2024 -- Ariel Drake MD          Follow-up Information       Follow up With Specialties Details Why Contact Info    Paige Hawkins MD Cardiology In 1 week  [address]  [facility] [address]  [phone]      [facility] - Emergency Dept Emergency Medicine  If symptoms  worsen [address]  [facility] [address]  [phone]             Ariel Drake MD  Resident  02/20/24 1248       Jhon Adams MD  02/21/24 9994

## 2024-02-29 ENCOUNTER — HOSPITAL ENCOUNTER (OUTPATIENT)
Facility: HOSPITAL | Age: 79
Discharge: HOME | End: 2024-03-01
Attending: STUDENT IN AN ORGANIZED HEALTH CARE EDUCATION/TRAINING PROGRAM | Admitting: STUDENT IN AN ORGANIZED HEALTH CARE EDUCATION/TRAINING PROGRAM
Payer: MEDICARE

## 2024-02-29 DIAGNOSIS — R07.9 CHEST PAIN: ICD-10-CM

## 2024-02-29 DIAGNOSIS — R22.0 FACIAL SWELLING: ICD-10-CM

## 2024-02-29 LAB
ALBUMIN SERPL BCP-MCNC: 3.3 G/DL (ref 3.5–5.2)
ALP SERPL-CCNC: 69 U/L (ref 55–135)
ALT SERPL W/O P-5'-P-CCNC: 8 U/L (ref 10–44)
ANION GAP SERPL CALC-SCNC: 8 MMOL/L (ref 8–16)
AST SERPL-CCNC: 16 U/L (ref 10–40)
BASOPHILS # BLD AUTO: 0.07 K/UL (ref 0–0.2)
BASOPHILS NFR BLD: 0.7 % (ref 0–1.9)
BILIRUB SERPL-MCNC: 0.4 MG/DL (ref 0.1–1)
BUN SERPL-MCNC: 21 MG/DL (ref 8–23)
CALCIUM SERPL-MCNC: 9.6 MG/DL (ref 8.7–10.5)
CHLORIDE SERPL-SCNC: 105 MMOL/L (ref 95–110)
CO2 SERPL-SCNC: 26 MMOL/L (ref 23–29)
CREAT SERPL-MCNC: 0.8 MG/DL (ref 0.5–1.4)
CRP SERPL-MCNC: 10.6 MG/L (ref 0–8.2)
DIFFERENTIAL METHOD BLD: ABNORMAL
EOSINOPHIL # BLD AUTO: 0.8 K/UL (ref 0–0.5)
EOSINOPHIL NFR BLD: 8.1 % (ref 0–8)
ERYTHROCYTE [DISTWIDTH] IN BLOOD BY AUTOMATED COUNT: 13.1 % (ref 11.5–14.5)
EST. GFR  (NO RACE VARIABLE): >60 ML/MIN/1.73 M^2
GLUCOSE SERPL-MCNC: 93 MG/DL (ref 70–110)
HCT VFR BLD AUTO: 37.6 % (ref 37–48.5)
HCV AB SERPL QL IA: NORMAL
HGB BLD-MCNC: 12.3 G/DL (ref 12–16)
HIV 1+2 AB+HIV1 P24 AG SERPL QL IA: NORMAL
IMM GRANULOCYTES # BLD AUTO: 0.04 K/UL (ref 0–0.04)
IMM GRANULOCYTES NFR BLD AUTO: 0.4 % (ref 0–0.5)
LYMPHOCYTES # BLD AUTO: 2.3 K/UL (ref 1–4.8)
LYMPHOCYTES NFR BLD: 23 % (ref 18–48)
MCH RBC QN AUTO: 28.5 PG (ref 27–31)
MCHC RBC AUTO-ENTMCNC: 32.7 G/DL (ref 32–36)
MCV RBC AUTO: 87 FL (ref 82–98)
MONOCYTES # BLD AUTO: 0.6 K/UL (ref 0.3–1)
MONOCYTES NFR BLD: 5.9 % (ref 4–15)
NEUTROPHILS # BLD AUTO: 6.3 K/UL (ref 1.8–7.7)
NEUTROPHILS NFR BLD: 61.9 % (ref 38–73)
NRBC BLD-RTO: 0 /100 WBC
PLATELET # BLD AUTO: 399 K/UL (ref 150–450)
PMV BLD AUTO: 8.9 FL (ref 9.2–12.9)
POTASSIUM SERPL-SCNC: 3.3 MMOL/L (ref 3.5–5.1)
PROCALCITONIN SERPL IA-MCNC: 0.12 NG/ML
PROT SERPL-MCNC: 7.5 G/DL (ref 6–8.4)
RBC # BLD AUTO: 4.31 M/UL (ref 4–5.4)
SODIUM SERPL-SCNC: 139 MMOL/L (ref 136–145)
WBC # BLD AUTO: 10.19 K/UL (ref 3.9–12.7)

## 2024-02-29 PROCEDURE — 25000003 PHARM REV CODE 250: Performed by: STUDENT IN AN ORGANIZED HEALTH CARE EDUCATION/TRAINING PROGRAM

## 2024-02-29 PROCEDURE — 96374 THER/PROPH/DIAG INJ IV PUSH: CPT | Mod: 59

## 2024-02-29 PROCEDURE — 86803 HEPATITIS C AB TEST: CPT | Performed by: STUDENT IN AN ORGANIZED HEALTH CARE EDUCATION/TRAINING PROGRAM

## 2024-02-29 PROCEDURE — G0378 HOSPITAL OBSERVATION PER HR: HCPCS

## 2024-02-29 PROCEDURE — 25000003 PHARM REV CODE 250: Performed by: PHYSICIAN ASSISTANT

## 2024-02-29 PROCEDURE — 84145 PROCALCITONIN (PCT): CPT | Performed by: PHYSICIAN ASSISTANT

## 2024-02-29 PROCEDURE — 25000003 PHARM REV CODE 250: Performed by: EMERGENCY MEDICINE

## 2024-02-29 PROCEDURE — 63600175 PHARM REV CODE 636 W HCPCS

## 2024-02-29 PROCEDURE — 80047 BASIC METABLC PNL IONIZED CA: CPT

## 2024-02-29 PROCEDURE — 87389 HIV-1 AG W/HIV-1&-2 AB AG IA: CPT | Performed by: STUDENT IN AN ORGANIZED HEALTH CARE EDUCATION/TRAINING PROGRAM

## 2024-02-29 PROCEDURE — 96375 TX/PRO/DX INJ NEW DRUG ADDON: CPT | Mod: 59

## 2024-02-29 PROCEDURE — 86140 C-REACTIVE PROTEIN: CPT | Performed by: PHYSICIAN ASSISTANT

## 2024-02-29 PROCEDURE — 63600175 PHARM REV CODE 636 W HCPCS: Performed by: PHYSICIAN ASSISTANT

## 2024-02-29 PROCEDURE — 85025 COMPLETE CBC W/AUTO DIFF WBC: CPT | Performed by: PHYSICIAN ASSISTANT

## 2024-02-29 PROCEDURE — 25000003 PHARM REV CODE 250

## 2024-02-29 PROCEDURE — 99285 EMERGENCY DEPT VISIT HI MDM: CPT | Mod: 25

## 2024-02-29 PROCEDURE — 63600175 PHARM REV CODE 636 W HCPCS: Performed by: EMERGENCY MEDICINE

## 2024-02-29 PROCEDURE — 25500020 PHARM REV CODE 255: Performed by: STUDENT IN AN ORGANIZED HEALTH CARE EDUCATION/TRAINING PROGRAM

## 2024-02-29 PROCEDURE — 96361 HYDRATE IV INFUSION ADD-ON: CPT

## 2024-02-29 PROCEDURE — 96372 THER/PROPH/DIAG INJ SC/IM: CPT | Mod: 59

## 2024-02-29 PROCEDURE — 82330 ASSAY OF CALCIUM: CPT

## 2024-02-29 PROCEDURE — 80053 COMPREHEN METABOLIC PANEL: CPT | Performed by: PHYSICIAN ASSISTANT

## 2024-02-29 RX ORDER — IBUPROFEN 200 MG
24 TABLET ORAL
Status: DISCONTINUED | OUTPATIENT
Start: 2024-02-29 | End: 2024-03-01 | Stop reason: HOSPADM

## 2024-02-29 RX ORDER — SODIUM CHLORIDE 0.9 % (FLUSH) 0.9 %
5 SYRINGE (ML) INJECTION
Status: DISCONTINUED | OUTPATIENT
Start: 2024-02-29 | End: 2024-03-01 | Stop reason: HOSPADM

## 2024-02-29 RX ORDER — ASPIRIN 81 MG/1
81 TABLET ORAL DAILY
Status: DISCONTINUED | OUTPATIENT
Start: 2024-03-01 | End: 2024-03-01 | Stop reason: HOSPADM

## 2024-02-29 RX ORDER — FERROUS SULFATE, DRIED 160(50) MG
1 TABLET, EXTENDED RELEASE ORAL DAILY
Status: DISCONTINUED | OUTPATIENT
Start: 2024-03-01 | End: 2024-03-01 | Stop reason: HOSPADM

## 2024-02-29 RX ORDER — CYCLOBENZAPRINE HCL 5 MG
10 TABLET ORAL 3 TIMES DAILY PRN
Status: DISCONTINUED | OUTPATIENT
Start: 2024-02-29 | End: 2024-03-01 | Stop reason: HOSPADM

## 2024-02-29 RX ORDER — CHLORHEXIDINE GLUCONATE ORAL RINSE 1.2 MG/ML
SOLUTION DENTAL
COMMUNITY
Start: 2024-02-28

## 2024-02-29 RX ORDER — NALOXONE HCL 0.4 MG/ML
0.02 VIAL (ML) INJECTION
Status: DISCONTINUED | OUTPATIENT
Start: 2024-02-29 | End: 2024-03-01 | Stop reason: HOSPADM

## 2024-02-29 RX ORDER — AMLODIPINE BESYLATE 10 MG/1
10 TABLET ORAL DAILY
Status: DISCONTINUED | OUTPATIENT
Start: 2024-03-01 | End: 2024-02-29

## 2024-02-29 RX ORDER — TALC
6 POWDER (GRAM) TOPICAL NIGHTLY PRN
Status: DISCONTINUED | OUTPATIENT
Start: 2024-02-29 | End: 2024-03-01 | Stop reason: HOSPADM

## 2024-02-29 RX ORDER — DIPHENHYDRAMINE HYDROCHLORIDE 50 MG/ML
25 INJECTION INTRAMUSCULAR; INTRAVENOUS EVERY 6 HOURS PRN
Status: DISCONTINUED | OUTPATIENT
Start: 2024-02-29 | End: 2024-03-01 | Stop reason: HOSPADM

## 2024-02-29 RX ORDER — TRIAMCINOLONE ACETONIDE 1 MG/G
PASTE DENTAL
COMMUNITY
Start: 2024-02-28

## 2024-02-29 RX ORDER — ENOXAPARIN SODIUM 100 MG/ML
40 INJECTION SUBCUTANEOUS EVERY 24 HOURS
Status: DISCONTINUED | OUTPATIENT
Start: 2024-02-29 | End: 2024-03-01 | Stop reason: HOSPADM

## 2024-02-29 RX ORDER — CETIRIZINE HYDROCHLORIDE 5 MG/1
10 TABLET ORAL 2 TIMES DAILY
Status: DISCONTINUED | OUTPATIENT
Start: 2024-03-01 | End: 2024-03-01 | Stop reason: HOSPADM

## 2024-02-29 RX ORDER — FAMOTIDINE 10 MG/ML
20 INJECTION INTRAVENOUS 2 TIMES DAILY
Status: DISCONTINUED | OUTPATIENT
Start: 2024-03-01 | End: 2024-03-01 | Stop reason: HOSPADM

## 2024-02-29 RX ORDER — FAMOTIDINE 20 MG/1
20 TABLET, FILM COATED ORAL
Status: COMPLETED | OUTPATIENT
Start: 2024-02-29 | End: 2024-02-29

## 2024-02-29 RX ORDER — SIMETHICONE 80 MG
1 TABLET,CHEWABLE ORAL 4 TIMES DAILY PRN
Status: DISCONTINUED | OUTPATIENT
Start: 2024-02-29 | End: 2024-03-01 | Stop reason: HOSPADM

## 2024-02-29 RX ORDER — ACETAMINOPHEN 500 MG
1000 TABLET ORAL EVERY 8 HOURS PRN
Status: DISCONTINUED | OUTPATIENT
Start: 2024-02-29 | End: 2024-03-01

## 2024-02-29 RX ORDER — CETIRIZINE HYDROCHLORIDE 5 MG/1
10 TABLET ORAL
Status: COMPLETED | OUTPATIENT
Start: 2024-02-29 | End: 2024-02-29

## 2024-02-29 RX ORDER — POLYETHYLENE GLYCOL 3350 17 G/17G
17 POWDER, FOR SOLUTION ORAL 2 TIMES DAILY PRN
Status: DISCONTINUED | OUTPATIENT
Start: 2024-02-29 | End: 2024-03-01 | Stop reason: HOSPADM

## 2024-02-29 RX ORDER — ACETAMINOPHEN 325 MG/1
650 TABLET ORAL EVERY 4 HOURS PRN
Status: DISCONTINUED | OUTPATIENT
Start: 2024-02-29 | End: 2024-03-01

## 2024-02-29 RX ORDER — GLUCAGON 1 MG
1 KIT INJECTION
Status: DISCONTINUED | OUTPATIENT
Start: 2024-02-29 | End: 2024-03-01 | Stop reason: HOSPADM

## 2024-02-29 RX ORDER — ONDANSETRON 8 MG/1
8 TABLET, ORALLY DISINTEGRATING ORAL EVERY 8 HOURS PRN
Status: DISCONTINUED | OUTPATIENT
Start: 2024-02-29 | End: 2024-03-01 | Stop reason: HOSPADM

## 2024-02-29 RX ORDER — INSULIN ASPART 100 [IU]/ML
0-5 INJECTION, SOLUTION INTRAVENOUS; SUBCUTANEOUS
Status: DISCONTINUED | OUTPATIENT
Start: 2024-02-29 | End: 2024-03-01 | Stop reason: HOSPADM

## 2024-02-29 RX ORDER — ALUMINUM HYDROXIDE, MAGNESIUM HYDROXIDE, AND SIMETHICONE 1200; 120; 1200 MG/30ML; MG/30ML; MG/30ML
30 SUSPENSION ORAL 4 TIMES DAILY PRN
Status: DISCONTINUED | OUTPATIENT
Start: 2024-02-29 | End: 2024-03-01 | Stop reason: HOSPADM

## 2024-02-29 RX ORDER — IPRATROPIUM BROMIDE AND ALBUTEROL SULFATE 2.5; .5 MG/3ML; MG/3ML
3 SOLUTION RESPIRATORY (INHALATION) EVERY 4 HOURS PRN
Status: DISCONTINUED | OUTPATIENT
Start: 2024-02-29 | End: 2024-03-01 | Stop reason: HOSPADM

## 2024-02-29 RX ORDER — PROCHLORPERAZINE EDISYLATE 5 MG/ML
5 INJECTION INTRAMUSCULAR; INTRAVENOUS EVERY 6 HOURS PRN
Status: DISCONTINUED | OUTPATIENT
Start: 2024-02-29 | End: 2024-03-01 | Stop reason: HOSPADM

## 2024-02-29 RX ORDER — ATORVASTATIN CALCIUM 20 MG/1
20 TABLET, FILM COATED ORAL DAILY
Status: DISCONTINUED | OUTPATIENT
Start: 2024-03-01 | End: 2024-03-01 | Stop reason: HOSPADM

## 2024-02-29 RX ORDER — NIFEDIPINE 30 MG/1
30 TABLET, EXTENDED RELEASE ORAL DAILY
Status: DISCONTINUED | OUTPATIENT
Start: 2024-03-01 | End: 2024-03-01

## 2024-02-29 RX ORDER — ONDANSETRON HYDROCHLORIDE 2 MG/ML
4 INJECTION, SOLUTION INTRAVENOUS
Status: COMPLETED | OUTPATIENT
Start: 2024-02-29 | End: 2024-02-29

## 2024-02-29 RX ORDER — BISACODYL 10 MG/1
10 SUPPOSITORY RECTAL DAILY PRN
Status: DISCONTINUED | OUTPATIENT
Start: 2024-02-29 | End: 2024-03-01 | Stop reason: HOSPADM

## 2024-02-29 RX ORDER — KETOROLAC TROMETHAMINE 30 MG/ML
15 INJECTION, SOLUTION INTRAMUSCULAR; INTRAVENOUS ONCE
Status: COMPLETED | OUTPATIENT
Start: 2024-02-29 | End: 2024-02-29

## 2024-02-29 RX ORDER — DIPHENHYDRAMINE HCL 25 MG
25 CAPSULE ORAL
Status: DISCONTINUED | OUTPATIENT
Start: 2024-02-29 | End: 2024-02-29

## 2024-02-29 RX ORDER — POTASSIUM CHLORIDE 20 MEQ/1
40 TABLET, EXTENDED RELEASE ORAL ONCE
Status: COMPLETED | OUTPATIENT
Start: 2024-02-29 | End: 2024-02-29

## 2024-02-29 RX ORDER — IBUPROFEN 200 MG
16 TABLET ORAL
Status: DISCONTINUED | OUTPATIENT
Start: 2024-02-29 | End: 2024-03-01 | Stop reason: HOSPADM

## 2024-02-29 RX ORDER — KETOROLAC TROMETHAMINE 30 MG/ML
15 INJECTION, SOLUTION INTRAMUSCULAR; INTRAVENOUS
Status: COMPLETED | OUTPATIENT
Start: 2024-02-29 | End: 2024-02-29

## 2024-02-29 RX ORDER — CETIRIZINE HYDROCHLORIDE 5 MG/1
10 TABLET ORAL 2 TIMES DAILY
Status: DISCONTINUED | OUTPATIENT
Start: 2024-02-29 | End: 2024-02-29

## 2024-02-29 RX ADMIN — IOHEXOL 75 ML: 350 INJECTION, SOLUTION INTRAVENOUS at 05:02

## 2024-02-29 RX ADMIN — CETIRIZINE HYDROCHLORIDE 10 MG: 5 TABLET, FILM COATED ORAL at 04:02

## 2024-02-29 RX ADMIN — ACETAMINOPHEN 650 MG: 325 TABLET ORAL at 10:02

## 2024-02-29 RX ADMIN — FAMOTIDINE 20 MG: 20 TABLET, FILM COATED ORAL at 04:02

## 2024-02-29 RX ADMIN — KETOROLAC TROMETHAMINE 15 MG: 30 INJECTION, SOLUTION INTRAMUSCULAR; INTRAVENOUS at 04:02

## 2024-02-29 RX ADMIN — KETOROLAC TROMETHAMINE 15 MG: 30 INJECTION, SOLUTION INTRAMUSCULAR; INTRAVENOUS at 11:02

## 2024-02-29 RX ADMIN — SODIUM CHLORIDE 500 ML: 9 INJECTION, SOLUTION INTRAVENOUS at 06:02

## 2024-02-29 RX ADMIN — ENOXAPARIN SODIUM 40 MG: 40 INJECTION SUBCUTANEOUS at 11:02

## 2024-02-29 RX ADMIN — POTASSIUM CHLORIDE 40 MEQ: 1500 TABLET, EXTENDED RELEASE ORAL at 11:02

## 2024-02-29 RX ADMIN — ONDANSETRON 4 MG: 2 INJECTION INTRAMUSCULAR; INTRAVENOUS at 06:02

## 2024-02-29 NOTE — ED NOTES
Patient states her gums are swelling, states she was having a reaction to anbesol , states her Dentist gave her Triamcinolone and chlorhexidine once

## 2024-02-29 NOTE — ED NOTES
Patient identifiers verified and correct for  MS Jacob  C/C: Gum swelling SEE NN   APPEARANCE: awake and alert in NAD. PAIN  10/10  SKIN: warm, dry and intact. No breakdown or bruising.  MUSCULOSKELETAL: Patient moving all extremities spontaneously,  Ambulates independently.  RESPIRATORY: Denies shortness of breath.Respirations unlabored.   CARDIAC: Denies CP, 2+ distal pulses; no peripheral edema  ABDOMEN: S/ND/NT, Denies nausea  : voids spontaneously, denies difficulty  Neurologic: AAO x 4; follows commands equal strength in all extremities; denies numbness/tingling. Denies dizziness  Denies new weakness, states she is unable to swallow her saliva

## 2024-02-29 NOTE — FIRST PROVIDER EVALUATION
Medical screening examination initiated.  I have conducted a focused provider triage encounter, findings are as follows:    Brief history of present illness:  triamcinolone paste and chlorhexidine oral rinses following dental visit yesterday with swollen mouth and concern for allergic reaction    Vitals:    02/29/24 1336   BP: (!) 144/65   BP Location: Right arm   Patient Position: Sitting   Pulse: 81   Resp: 16   Temp: 98.3 °F (36.8 °C)   TempSrc: Oral   SpO2: 97%   Weight: 81.6 kg (180 lb)       Pertinent physical exam:  swelling to outside of lip and inside the R oropharynx with poor dentition, no fluctuance, no purulence, speaking in full sentences, back of oropharynx normal, normal breath sounds, + murmur    Unclear if allergic vs related to primary process of dental infection?  No ACEI on list, need observation to see if swelling worsens or spreads    Brief workup plan:  benadryl/pepcid for now, defer remainder of intervention/eval to ED team.    Preliminary workup initiated; this workup will be continued and followed by the physician or advanced practice provider that is assigned to the patient when roomed.

## 2024-02-29 NOTE — ED PROVIDER NOTES
Encounter Date: 2/29/2024       History     Chief Complaint   Patient presents with    Allergic Reaction     Swelling to right side of face, swelling in gums from antimicrobial mouthwash given by dentist. No compromise to airway      The history is provided by the patient and medical records. No  was used.     Lesly Jacob is a 78 y.o. female with medical history of HTN, HLD, T2DM presenting to the ED with the chief complaint of facial swelling.     Patient developed R upper gum pain a few days ago. Used her family members benzocaine ointment. Awoke the next day with R lower facial swelling. Saw her dentist yesterday and given Kenalog dental ointment and chlorhexidene mouth wash. Reports today the swelling is worse and involves her R lower lip. Reports excessive saliva production. Denies difficulty swallowing, voice change, neck pain, neck stiffness. She is edentulous on the top. Denies rash to other parts of her body. Denies pruritus. No fever, chills, diaphoresis.      Review of patient's allergies indicates:   Allergen Reactions    Anbesol [benzocaine] Swelling     Oral swelling     Amoxicillin Nausea And Vomiting     Past Medical History:   Diagnosis Date    Bronchitis     Diabetes mellitus     Hyperlipidemia     Hypertension     Pneumonia     Pneumonia      Past Surgical History:   Procedure Laterality Date    HYSTERECTOMY       Family History   Problem Relation Age of Onset    Heart failure Mother     Hypertension Mother     Heart attack Brother      Social History     Tobacco Use    Smoking status: Never    Smokeless tobacco: Never   Substance Use Topics    Alcohol use: No    Drug use: No     Review of Systems   Genitourinary:  Positive for genital sores.       Physical Exam     Initial Vitals [02/29/24 1336]   BP Pulse Resp Temp SpO2   (!) 144/65 81 16 98.3 °F (36.8 °C) 97 %      MAP       --         Physical Exam    Constitutional: She appears well-developed and  well-nourished. She is not diaphoretic. No distress.   HENT:   Head: Normocephalic and atraumatic.   Mouth/Throat: Oropharynx is clear and moist. No oropharyngeal exudate.   R lower mandibular and submandibular swelling. R lower lateral lip swelling. No sublingual induration. Edentulous on top and R lower. White lesions on hard palate. TTP to lower gingiva    Eyes: Conjunctivae and EOM are normal. Pupils are equal, round, and reactive to light. No scleral icterus.   Neck: Neck supple.   Normal range of motion.  Cardiovascular:  Normal rate and regular rhythm.           Pulmonary/Chest: Breath sounds normal. No respiratory distress. She has no wheezes.   Abdominal: Abdomen is soft. She exhibits no distension. There is no abdominal tenderness. There is no rebound.   Musculoskeletal:         General: No tenderness or edema. Normal range of motion.      Cervical back: Normal range of motion and neck supple.     Neurological: She is alert and oriented to person, place, and time. She has normal strength. No sensory deficit.   Skin: Skin is warm and dry. No rash noted. No erythema.   Psychiatric: She has a normal mood and affect.         ED Course   Procedures  Labs Reviewed   CBC W/ AUTO DIFFERENTIAL - Abnormal; Notable for the following components:       Result Value    MPV 8.9 (*)     Eos # 0.8 (*)     Eosinophil % 8.1 (*)     All other components within normal limits    Narrative:     Release to patient->Immediate   COMPREHENSIVE METABOLIC PANEL - Abnormal; Notable for the following components:    Potassium 3.3 (*)     Albumin 3.3 (*)     ALT 8 (*)     All other components within normal limits    Narrative:     Release to patient->Immediate   C-REACTIVE PROTEIN - Abnormal; Notable for the following components:    CRP 10.6 (*)     All other components within normal limits    Narrative:     Release to patient->Immediate   HIV 1 / 2 ANTIBODY    Narrative:     Release to patient->Immediate   HEPATITIS C ANTIBODY     Narrative:     Release to patient->Immediate   PROCALCITONIN    Narrative:     Release to patient->Immediate   SEDIMENTATION RATE   URINALYSIS, REFLEX TO URINE CULTURE   POCT GLUCOSE, HAND-HELD DEVICE   ISTAT CHEM8          Imaging Results               CT Soft Tissue Neck With Contrast (Final result)  Result time 02/29/24 18:50:02      Final result by Reji Segura MD (02/29/24 18:50:02)                   Impression:      Nonspecific swelling/soft tissue edema about the right greater than left aspect of the chin.  No fluid collection identified or underlying mass lesion.  The mandible is intact without evidence of osseous destructive infectious process.    Few prominent cervical lymph nodes, none of which are definitively enlarged.    Extensive calcifications of the carotid vessels.  Dedicated outpatient carotid ultrasound is suggested for further evaluation.    This report was flagged in Epic as abnormal.    Electronically signed by resident: Miri Seymour  Date:    02/29/2024  Time:    18:08    Electronically signed by: Reji Segura MD  Date:    02/29/2024  Time:    18:50               Narrative:    EXAMINATION:  CT SOFT TISSUE NECK WITH CONTRAST    CLINICAL HISTORY:  Neck abscess, deep tissue;    TECHNIQUE:  Axial CT images obtained throughout the region of the neck after the administration of 75 ml omnipaque 350 intravenous contrast. Axial, sagittal and coronal reconstructions were performed.    COMPARISON:  CT 02/05/2017.    FINDINGS:  There is mild nonspecific swelling/generalized soft tissue edema about the right greater than left aspect of the chin (2-91).  No drainable or rim enhancing fluid collection identified.  No definite mass or phlegmonous change.  The underlying mandible is intact without evidence of osseous destructive infectious process.    No abnormal soft tissue mass is identified within the neck. There is no evidence of pathologic lymph node enlargement.  There are few prominent cervical lymph  nodes, none of which are definitively enlarged (for example there is a 1.0 cm somewhat rounded right retromandibular lymph node (2-93), which may be reactive.  There are no prior images of the neck available for direct comparison.    The soft tissues of the nasopharynx, oropharynx, hypopharynx and larynx are within normal limits. The parotid, submandibular, and thyroid glands demonstrate nothing unusual.    There is mucosal thickening of the paranasal sinuses, most pronounced at the inferior aspect of the bilateral maxillary antra and within the anterior ethmoid sinuses.  The mastoid air cells are clear.    Intracranial compartment: Partially visualized.  Mild generalized cerebral volume loss.  The proximal major branch vessels appear patent.    Lung apices are clear.  There are minor osseous degenerative changes, but no lytic or blastic lesions are evident.    There are extensive calcifications of the carotid vessels.                                       Medications   sodium chloride 0.9% flush 5 mL (has no administration in time range)   albuterol-ipratropium 2.5 mg-0.5 mg/3 mL nebulizer solution 3 mL (has no administration in time range)   melatonin tablet 6 mg (has no administration in time range)   ondansetron disintegrating tablet 8 mg (has no administration in time range)   prochlorperazine injection Soln 5 mg (has no administration in time range)   polyethylene glycol packet 17 g (has no administration in time range)   bisacodyL suppository 10 mg (has no administration in time range)   simethicone chewable tablet 80 mg (has no administration in time range)   aluminum-magnesium hydroxide-simethicone 200-200-20 mg/5 mL suspension 30 mL (has no administration in time range)   acetaminophen tablet 650 mg (has no administration in time range)   acetaminophen tablet 1,000 mg (has no administration in time range)   naloxone 0.4 mg/mL injection 0.02 mg (has no administration in time range)   glucose chewable tablet  16 g (has no administration in time range)   glucose chewable tablet 24 g (has no administration in time range)   glucagon (human recombinant) injection 1 mg (has no administration in time range)   enoxaparin injection 40 mg (has no administration in time range)   insulin aspart U-100 pen 0-5 Units (has no administration in time range)   dextrose 10% bolus 125 mL 125 mL (has no administration in time range)   dextrose 10% bolus 250 mL 250 mL (has no administration in time range)   famotidine tablet 20 mg (20 mg Oral Given 2/29/24 1609)   cetirizine tablet 10 mg (10 mg Oral Given 2/29/24 1609)   ketorolac injection 15 mg (15 mg Intravenous Given 2/29/24 1612)   iohexoL (OMNIPAQUE 350) injection 75 mL (75 mLs Intravenous Given 2/29/24 1721)   ondansetron injection 4 mg (4 mg Intravenous Given 2/29/24 1807)   sodium chloride 0.9% bolus 500 mL 500 mL (0 mLs Intravenous Stopped 2/29/24 1922)     Medical Decision Making  78 y.o. female with medical history of HTN, HLD, T2DM presenting to the ED c/o R lower facial swelling for the past few days. Concerned for allergic reaction to benzocaine ointment she was using for gum pain. Saw dentist yesterday and given RX For kenalog oral paste and chlorhexidene rinse.     DDx includes but not limited to angioedema, carlos manuel's angina, dental abscess, gingivitis, medication allergic reaction, sialoadenitis     Amount and/or Complexity of Data Reviewed  External Data Reviewed: labs and notes.  Labs: ordered. Decision-making details documented in ED Course.  Radiology: ordered and independent interpretation performed.  Discussion of management or test interpretation with external provider(s):    regarding admission    Risk  OTC drugs.  Prescription drug management.               ED Course as of 02/29/24 2127   Thu Feb 29, 2024 2010 Procalcitonin: 0.12 [BA]   2010 WBC: 10.19 [BA]   2010 CRP(!): 10.6  Slight elevation [BA]   2010 Creatinine: 0.8 [BA]   2010 CT showing Nonspecific  swelling/soft tissue edema about the right greater than left aspect of the chin.  No fluid collection identified or underlying mass lesion.  The mandible is intact without evidence of osseous destructive infectious process. Few prominent lymph nodes. [BA]   2012 Patient notes mild improvement of swelling after Zyrtec and Pepcid. Avoiding steroids given DM status.  [BA]   2013 No clear infectious etiology causing patient's swelling. No deep space abscess. Suspect atypical angioedema as possible cause? No airway compromise. Discussed with HM, placed in observation for further management to ensure swelling improves. Patient expresses understanding and agreeable to the plan. I have discussed the care of this patient with my supervising physician.  [BA]      ED Course User Index  [BA] Benny Elliott PA-C                           Clinical Impression:  Final diagnoses:  [R22.0] Facial swelling          ED Disposition Condition    Observation                 Benny Elliott PA-C  02/29/24 2127

## 2024-02-29 NOTE — ED NOTES
I-STAT Chem-8+ Results:   Value Reference Range   Sodium 139 136-145 mmol/L   Potassium  3.2 3.5-5.1 mmol/L   Chloride 102  mmol/L   Ionized Calcium 1.18 1.06-1.42 mmol/L   CO2 (measured) 25 23-29 mmol/L   Glucose 95  mg/dL   BUN 20 6-30 mg/dL   Creatinine 0.7 0.5-1.4 mg/dL   Hematocrit 38 36-54%

## 2024-03-01 VITALS
SYSTOLIC BLOOD PRESSURE: 131 MMHG | BODY MASS INDEX: 28.19 KG/M2 | TEMPERATURE: 98 F | OXYGEN SATURATION: 97 % | DIASTOLIC BLOOD PRESSURE: 67 MMHG | RESPIRATION RATE: 18 BRPM | HEART RATE: 70 BPM | WEIGHT: 180 LBS

## 2024-03-01 PROBLEM — B37.0 ORAL THRUSH: Status: ACTIVE | Noted: 2024-03-01

## 2024-03-01 PROBLEM — T78.3XXA ANGIOEDEMA: Status: ACTIVE | Noted: 2024-03-01

## 2024-03-01 LAB
ANION GAP SERPL CALC-SCNC: 10 MMOL/L (ref 8–16)
BUN SERPL-MCNC: 18 MG/DL (ref 8–23)
C4 SERPL-MCNC: 51 MG/DL (ref 11–44)
CALCIUM SERPL-MCNC: 9.5 MG/DL (ref 8.7–10.5)
CHLORIDE SERPL-SCNC: 103 MMOL/L (ref 95–110)
CO2 SERPL-SCNC: 24 MMOL/L (ref 23–29)
CREAT SERPL-MCNC: 0.9 MG/DL (ref 0.5–1.4)
ERYTHROCYTE [DISTWIDTH] IN BLOOD BY AUTOMATED COUNT: 12.9 % (ref 11.5–14.5)
EST. GFR  (NO RACE VARIABLE): >60 ML/MIN/1.73 M^2
ESTIMATED AVG GLUCOSE: 126 MG/DL (ref 68–131)
GLUCOSE SERPL-MCNC: 81 MG/DL (ref 70–110)
GLUCOSE SERPL-MCNC: 84 MG/DL (ref 70–110)
HBA1C MFR BLD: 6 % (ref 4–5.6)
HCT VFR BLD AUTO: 37.6 % (ref 37–48.5)
HGB BLD-MCNC: 12.2 G/DL (ref 12–16)
MAGNESIUM SERPL-MCNC: 1.9 MG/DL (ref 1.6–2.6)
MCH RBC QN AUTO: 28.8 PG (ref 27–31)
MCHC RBC AUTO-ENTMCNC: 32.4 G/DL (ref 32–36)
MCV RBC AUTO: 89 FL (ref 82–98)
PLATELET # BLD AUTO: 392 K/UL (ref 150–450)
PMV BLD AUTO: 8.8 FL (ref 9.2–12.9)
POCT GLUCOSE: 82 MG/DL (ref 70–110)
POTASSIUM SERPL-SCNC: 3.3 MMOL/L (ref 3.5–5.1)
RBC # BLD AUTO: 4.23 M/UL (ref 4–5.4)
SODIUM SERPL-SCNC: 137 MMOL/L (ref 136–145)
WBC # BLD AUTO: 8.34 K/UL (ref 3.9–12.7)

## 2024-03-01 PROCEDURE — G0378 HOSPITAL OBSERVATION PER HR: HCPCS

## 2024-03-01 PROCEDURE — 83036 HEMOGLOBIN GLYCOSYLATED A1C: CPT

## 2024-03-01 PROCEDURE — 85027 COMPLETE CBC AUTOMATED: CPT

## 2024-03-01 PROCEDURE — 25000003 PHARM REV CODE 250

## 2024-03-01 PROCEDURE — 96375 TX/PRO/DX INJ NEW DRUG ADDON: CPT

## 2024-03-01 PROCEDURE — 63600175 PHARM REV CODE 636 W HCPCS

## 2024-03-01 PROCEDURE — 82962 GLUCOSE BLOOD TEST: CPT

## 2024-03-01 PROCEDURE — 86160 COMPLEMENT ANTIGEN: CPT

## 2024-03-01 PROCEDURE — 83735 ASSAY OF MAGNESIUM: CPT

## 2024-03-01 PROCEDURE — 80048 BASIC METABOLIC PNL TOTAL CA: CPT

## 2024-03-01 PROCEDURE — 96376 TX/PRO/DX INJ SAME DRUG ADON: CPT

## 2024-03-01 RX ORDER — NYSTATIN 100000 [USP'U]/ML
500000 SUSPENSION ORAL 4 TIMES DAILY
Qty: 200 ML | Refills: 0 | Status: SHIPPED | OUTPATIENT
Start: 2024-03-01 | End: 2024-03-11

## 2024-03-01 RX ORDER — PREDNISONE 20 MG/1
40 TABLET ORAL DAILY
Status: DISCONTINUED | OUTPATIENT
Start: 2024-03-01 | End: 2024-03-01 | Stop reason: HOSPADM

## 2024-03-01 RX ORDER — CETIRIZINE HYDROCHLORIDE 10 MG/1
10 TABLET ORAL 2 TIMES DAILY
Qty: 10 TABLET | Refills: 0 | Status: SHIPPED | OUTPATIENT
Start: 2024-03-01 | End: 2024-03-11

## 2024-03-01 RX ORDER — AMLODIPINE BESYLATE 10 MG/1
10 TABLET ORAL DAILY
Status: DISCONTINUED | OUTPATIENT
Start: 2024-03-01 | End: 2024-03-01 | Stop reason: HOSPADM

## 2024-03-01 RX ORDER — NYSTATIN 100000 [USP'U]/ML
500000 SUSPENSION ORAL 4 TIMES DAILY
Status: DISCONTINUED | OUTPATIENT
Start: 2024-03-01 | End: 2024-03-01 | Stop reason: HOSPADM

## 2024-03-01 RX ORDER — ACETAMINOPHEN 500 MG
1000 TABLET ORAL EVERY 8 HOURS
Status: DISCONTINUED | OUTPATIENT
Start: 2024-03-01 | End: 2024-03-01 | Stop reason: HOSPADM

## 2024-03-01 RX ORDER — PREDNISONE 20 MG/1
40 TABLET ORAL DAILY
Qty: 6 TABLET | Refills: 0 | Status: SHIPPED | OUTPATIENT
Start: 2024-03-02 | End: 2024-03-11

## 2024-03-01 RX ORDER — FAMOTIDINE 20 MG/1
20 TABLET, FILM COATED ORAL 2 TIMES DAILY
Qty: 10 TABLET | Refills: 0 | Status: SHIPPED | OUTPATIENT
Start: 2024-03-01 | End: 2024-03-11

## 2024-03-01 RX ORDER — METOPROLOL TARTRATE 25 MG/1
25 TABLET, FILM COATED ORAL 2 TIMES DAILY
Status: DISCONTINUED | OUTPATIENT
Start: 2024-03-01 | End: 2024-03-01 | Stop reason: HOSPADM

## 2024-03-01 RX ORDER — KETOROLAC TROMETHAMINE 30 MG/ML
15 INJECTION, SOLUTION INTRAMUSCULAR; INTRAVENOUS EVERY 6 HOURS PRN
Status: DISCONTINUED | OUTPATIENT
Start: 2024-03-01 | End: 2024-03-01 | Stop reason: HOSPADM

## 2024-03-01 RX ADMIN — PREDNISONE 40 MG: 20 TABLET ORAL at 05:03

## 2024-03-01 RX ADMIN — ASPIRIN 81 MG: 81 TABLET, COATED ORAL at 09:03

## 2024-03-01 RX ADMIN — ACETAMINOPHEN 1000 MG: 500 TABLET ORAL at 06:03

## 2024-03-01 RX ADMIN — CETIRIZINE HYDROCHLORIDE 10 MG: 5 TABLET, FILM COATED ORAL at 09:03

## 2024-03-01 RX ADMIN — METOPROLOL TARTRATE 25 MG: 25 TABLET, FILM COATED ORAL at 09:03

## 2024-03-01 RX ADMIN — ATORVASTATIN CALCIUM 20 MG: 20 TABLET, FILM COATED ORAL at 09:03

## 2024-03-01 RX ADMIN — KETOROLAC TROMETHAMINE 15 MG: 30 INJECTION, SOLUTION INTRAMUSCULAR; INTRAVENOUS at 06:03

## 2024-03-01 RX ADMIN — FAMOTIDINE 20 MG: 10 INJECTION, SOLUTION INTRAVENOUS at 09:03

## 2024-03-01 RX ADMIN — NYSTATIN 500000 UNITS: 100000 SUSPENSION ORAL at 01:03

## 2024-03-01 RX ADMIN — NYSTATIN 500000 UNITS: 100000 SUSPENSION ORAL at 09:03

## 2024-03-01 RX ADMIN — Medication 1 TABLET: at 09:03

## 2024-03-01 RX ADMIN — AMLODIPINE BESYLATE 10 MG: 10 TABLET ORAL at 09:03

## 2024-03-01 NOTE — ED NOTES
Took report from Pedro SUE, and assumed care of pt at this time. Pt resting comfortably and independently repositioned in stretcher with bed locked in lowest position for safety. NAD noted at this time. Respirations even and unlabored and visible chest rise noted.  Patient offered bathroom assistance and denies need at this time. Pt instructed to call if assistance is needed. Call light within reach. No needs at this time. Will continue to monitor.

## 2024-03-01 NOTE — ASSESSMENT & PLAN NOTE
- elevated on admit, but possibly secondary to pain   - continue amlodipine and lopressor  - will further titrate meds as needed

## 2024-03-01 NOTE — ASSESSMENT & PLAN NOTE
"Patient's FSGs are controlled on current medication regimen.  Last A1c reviewed-   Lab Results   Component Value Date    HGBA1C 7.0 (H) 04/17/2012     Most recent fingerstick glucose reviewed- No results for input(s): "POCTGLUCOSE" in the last 24 hours.  Current correctional scale  Low  Maintain anti-hyperglycemic dose as follows-   Antihyperglycemics (From admission, onward)      Start     Stop Route Frequency Ordered    02/29/24 2146  insulin aspart U-100 pen 0-5 Units         -- SubQ Before meals & nightly PRN 02/29/24 2047          Hold Oral hypoglycemics while patient is in the hospital.  - hold oral metformin  - diabetic diet  - repeat A1c  "

## 2024-03-01 NOTE — H&P
[facility] - Emergency Dept  [facility] Medicine  History & Physical    Patient Name: Lesly Jacob  MRN: [id number]  Patient Class: OP- Observation  Admission Date: 2/29/2024  Attending Physician: Kam Perera MD   Primary Care Provider: Paige Hawkins MD         Patient information was obtained from patient and ER records.     Subjective:     Principal Problem:Angioedema    Chief Complaint:   Chief Complaint   Patient presents with    Allergic Reaction     Swelling to right side of face, swelling in gums from antimicrobial mouthwash given by dentist. No compromise to airway         HPI: Lesly Jacob is an 79 yo F with PMHx of HTN, HLD, T2DM who presented to ED for facial swelling. Patient developed R upper gum pain a few days ago so she started using a family member's benzocaine ointment. Awoke the next day with R lower facial swelling. Saw her dentist yesterday and given Kenalog dental ointment and chlorhexidene mouth wash. States that she has been unable to tolerate the chlorhexidine ointment secondary to severe burning when she tries to use it. Reports today the swelling is worse and involves her R lower lip. She states that her lips are not in pain, but endorses 10/10 pain to her gums and side of her tongue. Reports excessive saliva production. Denies difficulty swallowing, voice change, neck pain, neck stiffness, pruritis. She is edentulous on the top. Admits nausea. Denies rash to other parts of her body. Denies fever, chills, chest pain, palpitations, SOB, cough, abdominal pain, and LE swelling. Of note, she was seen in the ED on 02/20 for R arm pain, but states this has since resolved with naproxen and flexeril.     In ED: Afebrile. BP elevated to 187/66. Satting well on RA. CBC without leukocytosis or anemia. CMP only notable for K 3.3, albumin 3.3. CRP 10.6. Procal 0.12. CT soft tissue neck with nonspecific swelling/soft tissue edema about the right greater than left  aspect of the chin.  No fluid collection identified or underlying mass lesion.  The mandible is intact without evidence of osseous destructive infectious process. Given zyrtec 10 mg, famotidine 20 mg, IV toradol, IV zofran and  ml. Placed in observation.     Past Medical History:   Diagnosis Date    Bronchitis     Diabetes mellitus     Hyperlipidemia     Hypertension     Pneumonia     Pneumonia        Past Surgical History:   Procedure Laterality Date    HYSTERECTOMY         Review of patient's allergies indicates:   Allergen Reactions    Anbesol [benzocaine] Swelling     Oral swelling     Amoxicillin Nausea And Vomiting       No current facility-administered medications on file prior to encounter.     Current Outpatient Medications on File Prior to Encounter   Medication Sig    amLODIPine (NORVASC) 10 MG tablet take 1 tablet by Oral route 1 time per day    chlorhexidine (PERIDEX) 0.12 % solution SMARTSIG:By Mouth    metoprolol tartrate (LOPRESSOR) 25 MG tablet Take 1 tablet (25 mg total) by mouth 2 (two) times daily.    triamcinolone acetonide 0.1% (KENALOG) 0.1 % paste Place onto teeth.    aluminum-magnesium hydroxide-simethicone (MAALOX) 200-200-20 mg/5 mL Susp Take 30 mLs by mouth 4 (four) times daily before meals and nightly.    aspirin (ECOTRIN) 81 MG EC tablet Take 81 mg by mouth once daily.    atorvastatin (LIPITOR) 20 MG tablet take 1 tablet (20 mg) by oral route once daily    calcium carbonate-vitamin D3 500 mg-3.125 mcg (125 unit) per tablet Take 1 tablet by mouth once daily.      clotrimazole (LOTRIMIN) 1 % cream Apply to affected area 2 times daily    cyclobenzaprine (FLEXERIL) 10 MG tablet Take 1 tablet (10 mg total) by mouth 3 (three) times daily as needed for Muscle spasms.    esomeprazole (NEXIUM) 40 MG capsule Take 1 capsule (40 mg total) by mouth once daily.    FLUoxetine (PROZAC) 20 MG capsule take 1 capsule (20 mg) by oral route once daily    gabapentin (NEURONTIN) 100 MG capsule Take 1  capsule (100 mg total) by mouth 3 (three) times daily.    hydrOXYzine HCl (ATARAX) 25 MG tablet Take 25 mg by mouth 3 (three) times daily as needed for Itching.    ketorolac 0.5% (ACULAR) 0.5 % Drop     LIDOcaine (LIDODERM) 5 % Place 1 patch onto the skin once daily. Remove & Discard patch within 12 hours or as directed by MD    losartan-hydrochlorothiazide 100-25 mg (HYZAAR) 100-25 mg per tablet Take 1 tablet by mouth once daily.     losartan-hydrochlorothiazide 100-25 mg (HYZAAR) 100-25 mg per tablet take 1 tablet by oral route once daily    metFORMIN (GLUCOPHAGE) 500 MG tablet take 1 tablet by Oral route 2 times per day with morning and evening meals    naproxen (NAPROSYN) 500 MG tablet Take 1 tablet (500 mg total) by mouth 2 (two) times daily with meals.    neomycin-bacitracin-polymyxin (NEOSPORIN) ointment Apply topically 3 (three) times daily.    ofloxacin (OCUFLOX) 0.3 % ophthalmic solution     prednisoLONE acetate (PRED FORTE) 1 % DrpS      Family History       Problem Relation (Age of Onset)    Heart attack Brother    Heart failure Mother    Hypertension Mother          Tobacco Use    Smoking status: Never    Smokeless tobacco: Never   Substance and Sexual Activity    Alcohol use: No    Drug use: No    Sexual activity: Not on file     Review of Systems   Constitutional:  Negative for activity change, chills and fever.   HENT:  Positive for dental problem and facial swelling (lips (R>L) and R side of face). Negative for congestion, sinus pressure, sinus pain, sore throat and trouble swallowing.         Tongue pain   Eyes:  Negative for photophobia and visual disturbance.   Respiratory:  Negative for cough, chest tightness and shortness of breath.    Cardiovascular:  Negative for chest pain, palpitations and leg swelling.   Gastrointestinal:  Positive for nausea. Negative for abdominal pain, constipation, diarrhea and vomiting.   Genitourinary:  Negative for dysuria, frequency and hematuria.    Musculoskeletal:  Negative for back pain, gait problem and neck pain.   Skin:  Negative for rash and wound.   Neurological:  Negative for dizziness, syncope, speech difficulty and light-headedness.   Psychiatric/Behavioral:  Negative for agitation and confusion. The patient is not nervous/anxious.      Objective:     Vital Signs (Most Recent):  Temp: 97.7 °F (36.5 °C) (02/29/24 2311)  Pulse: 60 (02/29/24 2311)  Resp: 16 (02/29/24 2311)  BP: (!) 149/80 (02/29/24 2311)  SpO2: 95 % (02/29/24 2311) Vital Signs (24h Range):  Temp:  [97.7 °F (36.5 °C)-98.3 °F (36.8 °C)] 97.7 °F (36.5 °C)  Pulse:  [60-81] 60  Resp:  [16] 16  SpO2:  [95 %-98 %] 95 %  BP: (144-187)/(65-80) 149/80     Weight: 81.6 kg (180 lb)  Body mass index is 28.19 kg/m².     Physical Exam  Vitals and nursing note reviewed.   Constitutional:       General: She is not in acute distress.     Appearance: She is well-developed.   HENT:      Head: Normocephalic and atraumatic. No right periorbital erythema or left periorbital erythema.      Jaw: Tenderness and swelling present. No trismus.      Mouth/Throat:      Mouth: Angioedema (worse to R lower lip) present.      Dentition: Abnormal dentition.      Comments: Examination somewhat limited 2/2 pain. Edentulous to top gums. White patches to tongue and R buccal mucosa. R mandible very ttp.   Eyes:      Conjunctiva/sclera: Conjunctivae normal.      Pupils: Pupils are equal, round, and reactive to light.   Cardiovascular:      Rate and Rhythm: Normal rate and regular rhythm.      Heart sounds: Normal heart sounds.   Pulmonary:      Effort: Pulmonary effort is normal. No respiratory distress.      Breath sounds: Normal breath sounds. No wheezing.   Abdominal:      General: Bowel sounds are normal. There is no distension.      Palpations: Abdomen is soft.      Tenderness: There is no abdominal tenderness.   Musculoskeletal:         General: Normal range of motion.      Cervical back: Normal range of motion and  neck supple.      Right lower leg: No edema.      Left lower leg: No edema.   Skin:     General: Skin is warm and dry.      Capillary Refill: Capillary refill takes less than 2 seconds.      Findings: No rash.   Neurological:      Mental Status: She is alert and oriented to person, place, and time.      Cranial Nerves: No cranial nerve deficit.      Sensory: No sensory deficit.      Coordination: Coordination normal.   Psychiatric:         Behavior: Behavior normal.         Thought Content: Thought content normal.         Judgment: Judgment normal.              CRANIAL NERVES     CN III, IV, VI   Pupils are equal, round, and reactive to light.       Significant Labs: All pertinent labs within the past 24 hours have been reviewed.  CBC:   Recent Labs   Lab 02/29/24  1517   WBC 10.19   HGB 12.3   HCT 37.6        CMP:   Recent Labs   Lab 02/29/24  1517      K 3.3*      CO2 26   GLU 93   BUN 21   CREATININE 0.8   CALCIUM 9.6   PROT 7.5   ALBUMIN 3.3*   BILITOT 0.4   ALKPHOS 69   AST 16   ALT 8*   ANIONGAP 8       Significant Imaging: I have reviewed all pertinent imaging results/findings within the past 24 hours.  Imaging Results               CT Soft Tissue Neck With Contrast (Final result)  Result time 02/29/24 18:50:02      Final result by Reji Segura MD (02/29/24 18:50:02)                   Impression:      Nonspecific swelling/soft tissue edema about the right greater than left aspect of the chin.  No fluid collection identified or underlying mass lesion.  The mandible is intact without evidence of osseous destructive infectious process.    Few prominent cervical lymph nodes, none of which are definitively enlarged.    Extensive calcifications of the carotid vessels.  Dedicated outpatient carotid ultrasound is suggested for further evaluation.    This report was flagged in Epic as abnormal.    Electronically signed by resident: Miri  Lion  Date:    02/29/2024  Time:    18:08    Electronically signed by: Reji Segura MD  Date:    02/29/2024  Time:    18:50               Narrative:    EXAMINATION:  CT SOFT TISSUE NECK WITH CONTRAST    CLINICAL HISTORY:  Neck abscess, deep tissue;    TECHNIQUE:  Axial CT images obtained throughout the region of the neck after the administration of 75 ml omnipaque 350 intravenous contrast. Axial, sagittal and coronal reconstructions were performed.    COMPARISON:  CT 02/05/2017.    FINDINGS:  There is mild nonspecific swelling/generalized soft tissue edema about the right greater than left aspect of the chin (2-91).  No drainable or rim enhancing fluid collection identified.  No definite mass or phlegmonous change.  The underlying mandible is intact without evidence of osseous destructive infectious process.    No abnormal soft tissue mass is identified within the neck. There is no evidence of pathologic lymph node enlargement.  There are few prominent cervical lymph nodes, none of which are definitively enlarged (for example there is a 1.0 cm somewhat rounded right retromandibular lymph node (2-93), which may be reactive.  There are no prior images of the neck available for direct comparison.    The soft tissues of the nasopharynx, oropharynx, hypopharynx and larynx are within normal limits. The parotid, submandibular, and thyroid glands demonstrate nothing unusual.    There is mucosal thickening of the paranasal sinuses, most pronounced at the inferior aspect of the bilateral maxillary antra and within the anterior ethmoid sinuses.  The mastoid air cells are clear.    Intracranial compartment: Partially visualized.  Mild generalized cerebral volume loss.  The proximal major branch vessels appear patent.    Lung apices are clear.  There are minor osseous degenerative changes, but no lytic or blastic lesions are evident.    There are extensive calcifications of the carotid vessels.                                 "    Assessment/Plan:     * Angioedema  Facial Swelling   79 yo F with PMHx of HTN, HLD, T2DM who presented to ED for R lower facial swelling with gum and tongue pain that developed after using benzocaine ointment. Given Kenalog dental ointment and chlorhexidene mouth wash, but unable to tolerate. Satting well on RA. No SOB, pruritus, or rashes.     - Afebrile and without leukocytosis. Procal 0.12. Low concern for cellulitis   - CRP 10.6, ESR and C4 pending   - CT soft tissue neck with nonspecific swelling/soft tissue edema about the right greater than left aspect of the chin.  No fluid collection identified or underlying mass lesion.  The mandible is intact without evidence of osseous destructive infectious process.   - high suspicion that presentation is 2/2 allergic reaction to benzocaine  - start zyrtec 10 mg BID and IV pepcid BID  - start prednisone 40 mg daily  - prn benadryl   - MM pain management     Oral thrush  - reports she noticed white patches in her mouth over the past few days  - start nystatin swish and swallow QID    Other hyperlipidemia  - continue statin    Type 2 diabetes mellitus without complication, without long-term current use of insulin  Patient's FSGs are controlled on current medication regimen.  Last A1c reviewed-   Lab Results   Component Value Date    HGBA1C 7.0 (H) 04/17/2012     Most recent fingerstick glucose reviewed- No results for input(s): "POCTGLUCOSE" in the last 24 hours.  Current correctional scale  Low  Maintain anti-hyperglycemic dose as follows-   Antihyperglycemics (From admission, onward)      Start     Stop Route Frequency Ordered    02/29/24 2146  insulin aspart U-100 pen 0-5 Units         -- SubQ Before meals & nightly PRN 02/29/24 2047          Hold Oral hypoglycemics while patient is in the hospital.  - hold oral metformin  - diabetic diet  - repeat A1c    HTN (hypertension)  - elevated on admit, but possibly secondary to pain   - continue amlodipine and " lopressor  - will further titrate meds as needed      VTE Risk Mitigation (From admission, onward)           Ordered     enoxaparin injection 40 mg  Daily         02/29/24 2047     IP VTE HIGH RISK PATIENT  Once         02/29/24 2047                         On 03/01/2024, patient should be placed in hospital observation services under my care in collaboration with Dr. Esequiel Villar.           Karla Jennings PA-C  Department of Hospital Medicine  [facility] - Emergency Dept

## 2024-03-01 NOTE — PLAN OF CARE
[facility] - Emergency Dept  Initial Discharge Assessment       Primary Care Provider: Paige Hawkins MD    Admission Diagnosis: Facial swelling [R22.0]    Admission Date: 2/29/2024  Expected Discharge Date:     Pt stated she is independent with her ambulation and ADL's and does not require assistance or equipment.      Pt to d/c home with no needs.  Pt understands she can go through PCP office for some post acute services if required after d/c     Transition of Care Barriers: (P) None    Payor: [employer] [facility] / Plan: [employer] CHOICES / Product Type: Medicare Advantage /     Extended Emergency Contact Information  Primary Emergency Contact: Felecia Jacob  Address: [facility] [address] [place], [place] [id number] [place] [address]  Home Phone: [phone]  Mobile Phone: [phone]  Relation: Daughter    Discharge Plan A: (P) Home  Discharge Plan B: (P) Home      [id number] - [place], [place] - [address]  [address]  [place] [address]  Phone: [phone] Fax: [phone]      Initial Assessment (most recent)       Adult Discharge Assessment - 03/01/24 1157          Discharge Assessment    Assessment Type Discharge Planning Assessment (P)      Confirmed/corrected address, phone number and insurance Yes (P)      Confirmed Demographics Correct on Facesheet (P)      Source of Information patient (P)      Reason For Admission Angioedema (P)      People in Home alone (P)      Facility Arrived From: home (P)      Do you expect to return to your current living situation? Yes (P)      Do you have help at home or someone to help you manage your care at home? No (P)      Prior to hospitilization cognitive status: Alert/Oriented;No Deficits (P)      Current cognitive status: Alert/Oriented;No Deficits (P)      Walking or Climbing Stairs Difficulty no (P)      Dressing/Bathing Difficulty no (P)      Home Accessibility not wheelchair accessible (P)       Home Layout Able to live on 1st floor (P)      Equipment Currently Used at Home none (P)      Patient currently being followed by outpatient case management? No (P)      Do you currently have service(s) that help you manage your care at home? No (P)      Do you have any problems affording any of your prescribed medications? No (P)      Is the patient taking medications as prescribed? yes (P)      Who is going to help you get home at discharge? family/friends (P)      How do you get to doctors appointments? car, drives self (P)      Are you on dialysis? No (P)      Do you take coumadin? No (P)      Discharge Plan A Home (P)      Discharge Plan B Home (P)      DME Needed Upon Discharge  none (P)      Discharge Plan discussed with: Patient (P)      Transition of Care Barriers None (P)         Physical Activity    On average, how many days per week do you engage in moderate to strenuous exercise (like a brisk walk)? 0 days (P)      On average, how many minutes do you engage in exercise at this level? 0 min (P)         Financial Resource Strain    How hard is it for you to pay for the very basics like food, housing, medical care, and heating? Not very hard (P)         Housing Stability    In the last 12 months, was there a time when you were not able to pay the mortgage or rent on time? No (P)      In the last 12 months, was there a time when you did not have a steady place to sleep or slept in a shelter (including now)? No (P)         Transportation Needs    In the past 12 months, has lack of transportation kept you from medical appointments or from getting medications? No (P)      In the past 12 months, has lack of transportation kept you from meetings, work, or from getting things needed for daily living? No (P)         Food Insecurity    Within the past 12 months, you worried that your food would run out before you got the money to buy more. Never true (P)      Within the past 12 months, the food you bought just  didn't last and you didn't have money to get more. Never true (P)         Stress    Do you feel stress - tense, restless, nervous, or anxious, or unable to sleep at night because your mind is troubled all the time - these days? To some extent (P)         Social Connections    In a typical week, how many times do you talk on the phone with family, friends, or neighbors? More than three times a week (P)      How often do you get together with friends or relatives? More than three times a week (P)      How often do you attend Rastafari or Mandaeism services? More than 4 times per year (P)      Do you belong to any clubs or organizations such as Rastafari groups, unions, fraternal or athletic groups, or school groups? No (P)      How often do you attend meetings of the clubs or organizations you belong to? Never (P)      Are you , , , , never , or living with a partner?  (P)         Alcohol Use    Q1: How often do you have a drink containing alcohol? Never (P)      Q2: How many drinks containing alcohol do you have on a typical day when you are drinking? Patient does not drink (P)      Q3: How often do you have six or more drinks on one occasion? Never (P)                    Mony Ahn CD, MSW, LMSW, RSW   Case Management  [facility]  Email: [email or web address]

## 2024-03-01 NOTE — ASSESSMENT & PLAN NOTE
Facial Swelling   77 yo F with PMHx of HTN, HLD, T2DM who presented to ED for R lower facial swelling with gum and tongue pain that developed after using benzocaine ointment. Given Kenalog dental ointment and chlorhexidene mouth wash, but unable to tolerate. Satting well on RA. No SOB, pruritus, or rashes.     - Afebrile and without leukocytosis. Procal 0.12. Low concern for cellulitis   - CRP 10.6, ESR and C4 pending   - CT soft tissue neck with nonspecific swelling/soft tissue edema about the right greater than left aspect of the chin.  No fluid collection identified or underlying mass lesion.  The mandible is intact without evidence of osseous destructive infectious process.   - high suspicion that presentation is 2/2 allergic reaction to benzocaine  - start zyrtec 10 mg BID and IV pepcid BID  - start prednisone 40 mg daily  - prn benadryl   - MM pain management

## 2024-03-01 NOTE — PLAN OF CARE
Patient laying in bed resting comfortably at this time in no acute distress. She is alert and oriented x4. Vital signs are stable. Safety measures in place and call bell within reach.     Problem: Adult Inpatient Plan of Care  Goal: Plan of Care Review  Outcome: Ongoing, Progressing  Goal: Patient-Specific Goal (Individualized)  Outcome: Ongoing, Progressing  Goal: Absence of Hospital-Acquired Illness or Injury  Outcome: Ongoing, Progressing  Goal: Optimal Comfort and Wellbeing  Outcome: Ongoing, Progressing  Goal: Readiness for Transition of Care  Outcome: Ongoing, Progressing     Problem: Infection  Goal: Absence of Infection Signs and Symptoms  Outcome: Ongoing, Progressing

## 2024-03-01 NOTE — ASSESSMENT & PLAN NOTE
- reports she noticed white patches in her mouth over the past few days  - start nystatin swish and swallow QID

## 2024-03-01 NOTE — SUBJECTIVE & OBJECTIVE
Past Medical History:   Diagnosis Date    Bronchitis     Diabetes mellitus     Hyperlipidemia     Hypertension     Pneumonia     Pneumonia        Past Surgical History:   Procedure Laterality Date    HYSTERECTOMY         Review of patient's allergies indicates:   Allergen Reactions    Anbesol [benzocaine] Swelling     Oral swelling     Amoxicillin Nausea And Vomiting       No current facility-administered medications on file prior to encounter.     Current Outpatient Medications on File Prior to Encounter   Medication Sig    amLODIPine (NORVASC) 10 MG tablet take 1 tablet by Oral route 1 time per day    chlorhexidine (PERIDEX) 0.12 % solution SMARTSIG:By Mouth    metoprolol tartrate (LOPRESSOR) 25 MG tablet Take 1 tablet (25 mg total) by mouth 2 (two) times daily.    triamcinolone acetonide 0.1% (KENALOG) 0.1 % paste Place onto teeth.    aluminum-magnesium hydroxide-simethicone (MAALOX) 200-200-20 mg/5 mL Susp Take 30 mLs by mouth 4 (four) times daily before meals and nightly.    aspirin (ECOTRIN) 81 MG EC tablet Take 81 mg by mouth once daily.    atorvastatin (LIPITOR) 20 MG tablet take 1 tablet (20 mg) by oral route once daily    calcium carbonate-vitamin D3 500 mg-3.125 mcg (125 unit) per tablet Take 1 tablet by mouth once daily.      clotrimazole (LOTRIMIN) 1 % cream Apply to affected area 2 times daily    cyclobenzaprine (FLEXERIL) 10 MG tablet Take 1 tablet (10 mg total) by mouth 3 (three) times daily as needed for Muscle spasms.    esomeprazole (NEXIUM) 40 MG capsule Take 1 capsule (40 mg total) by mouth once daily.    FLUoxetine (PROZAC) 20 MG capsule take 1 capsule (20 mg) by oral route once daily    gabapentin (NEURONTIN) 100 MG capsule Take 1 capsule (100 mg total) by mouth 3 (three) times daily.    hydrOXYzine HCl (ATARAX) 25 MG tablet Take 25 mg by mouth 3 (three) times daily as needed for Itching.    ketorolac 0.5% (ACULAR) 0.5 % Drop     LIDOcaine (LIDODERM) 5 % Place 1 patch onto the skin once daily.  Remove & Discard patch within 12 hours or as directed by MD    losartan-hydrochlorothiazide 100-25 mg (HYZAAR) 100-25 mg per tablet Take 1 tablet by mouth once daily.     losartan-hydrochlorothiazide 100-25 mg (HYZAAR) 100-25 mg per tablet take 1 tablet by oral route once daily    metFORMIN (GLUCOPHAGE) 500 MG tablet take 1 tablet by Oral route 2 times per day with morning and evening meals    naproxen (NAPROSYN) 500 MG tablet Take 1 tablet (500 mg total) by mouth 2 (two) times daily with meals.    neomycin-bacitracin-polymyxin (NEOSPORIN) ointment Apply topically 3 (three) times daily.    ofloxacin (OCUFLOX) 0.3 % ophthalmic solution     prednisoLONE acetate (PRED FORTE) 1 % DrpS      Family History       Problem Relation (Age of Onset)    Heart attack Brother    Heart failure Mother    Hypertension Mother          Tobacco Use    Smoking status: Never    Smokeless tobacco: Never   Substance and Sexual Activity    Alcohol use: No    Drug use: No    Sexual activity: Not on file     Review of Systems   Constitutional:  Negative for activity change, chills and fever.   HENT:  Positive for dental problem and facial swelling (lips (R>L) and R side of face). Negative for congestion, sinus pressure, sinus pain, sore throat and trouble swallowing.         Tongue pain   Eyes:  Negative for photophobia and visual disturbance.   Respiratory:  Negative for cough, chest tightness and shortness of breath.    Cardiovascular:  Negative for chest pain, palpitations and leg swelling.   Gastrointestinal:  Positive for nausea. Negative for abdominal pain, constipation, diarrhea and vomiting.   Genitourinary:  Negative for dysuria, frequency and hematuria.   Musculoskeletal:  Negative for back pain, gait problem and neck pain.   Skin:  Negative for rash and wound.   Neurological:  Negative for dizziness, syncope, speech difficulty and light-headedness.   Psychiatric/Behavioral:  Negative for agitation and confusion. The patient is not  nervous/anxious.      Objective:     Vital Signs (Most Recent):  Temp: 97.7 °F (36.5 °C) (02/29/24 2311)  Pulse: 60 (02/29/24 2311)  Resp: 16 (02/29/24 2311)  BP: (!) 149/80 (02/29/24 2311)  SpO2: 95 % (02/29/24 2311) Vital Signs (24h Range):  Temp:  [97.7 °F (36.5 °C)-98.3 °F (36.8 °C)] 97.7 °F (36.5 °C)  Pulse:  [60-81] 60  Resp:  [16] 16  SpO2:  [95 %-98 %] 95 %  BP: (144-187)/(65-80) 149/80     Weight: 81.6 kg (180 lb)  Body mass index is 28.19 kg/m².     Physical Exam  Vitals and nursing note reviewed.   Constitutional:       General: She is not in acute distress.     Appearance: She is well-developed.   HENT:      Head: Normocephalic and atraumatic. No right periorbital erythema or left periorbital erythema.      Jaw: Tenderness and swelling present. No trismus.      Mouth/Throat:      Mouth: Angioedema (worse to R lower lip) present.      Dentition: Abnormal dentition.      Comments: Examination somewhat limited 2/2 pain. Edentulous to top gums. White patches to tongue and R buccal mucosa. R mandible very ttp.   Eyes:      Conjunctiva/sclera: Conjunctivae normal.      Pupils: Pupils are equal, round, and reactive to light.   Cardiovascular:      Rate and Rhythm: Normal rate and regular rhythm.      Heart sounds: Normal heart sounds.   Pulmonary:      Effort: Pulmonary effort is normal. No respiratory distress.      Breath sounds: Normal breath sounds. No wheezing.   Abdominal:      General: Bowel sounds are normal. There is no distension.      Palpations: Abdomen is soft.      Tenderness: There is no abdominal tenderness.   Musculoskeletal:         General: Normal range of motion.      Cervical back: Normal range of motion and neck supple.      Right lower leg: No edema.      Left lower leg: No edema.   Skin:     General: Skin is warm and dry.      Capillary Refill: Capillary refill takes less than 2 seconds.      Findings: No rash.   Neurological:      Mental Status: She is alert and oriented to person,  place, and time.      Cranial Nerves: No cranial nerve deficit.      Sensory: No sensory deficit.      Coordination: Coordination normal.   Psychiatric:         Behavior: Behavior normal.         Thought Content: Thought content normal.         Judgment: Judgment normal.              CRANIAL NERVES     CN III, IV, VI   Pupils are equal, round, and reactive to light.       Significant Labs: All pertinent labs within the past 24 hours have been reviewed.  CBC:   Recent Labs   Lab 02/29/24  1517   WBC 10.19   HGB 12.3   HCT 37.6        CMP:   Recent Labs   Lab 02/29/24  1517      K 3.3*      CO2 26   GLU 93   BUN 21   CREATININE 0.8   CALCIUM 9.6   PROT 7.5   ALBUMIN 3.3*   BILITOT 0.4   ALKPHOS 69   AST 16   ALT 8*   ANIONGAP 8       Significant Imaging: I have reviewed all pertinent imaging results/findings within the past 24 hours.  Imaging Results               CT Soft Tissue Neck With Contrast (Final result)  Result time 02/29/24 18:50:02      Final result by Reji Segura MD (02/29/24 18:50:02)                   Impression:      Nonspecific swelling/soft tissue edema about the right greater than left aspect of the chin.  No fluid collection identified or underlying mass lesion.  The mandible is intact without evidence of osseous destructive infectious process.    Few prominent cervical lymph nodes, none of which are definitively enlarged.    Extensive calcifications of the carotid vessels.  Dedicated outpatient carotid ultrasound is suggested for further evaluation.    This report was flagged in Epic as abnormal.    Electronically signed by resident: Miri Seymour  Date:    02/29/2024  Time:    18:08    Electronically signed by: Reji Segura MD  Date:    02/29/2024  Time:    18:50               Narrative:    EXAMINATION:  CT SOFT TISSUE NECK WITH CONTRAST    CLINICAL HISTORY:  Neck abscess, deep tissue;    TECHNIQUE:  Axial CT images obtained throughout the region of the neck after the  administration of 75 ml omnipaque 350 intravenous contrast. Axial, sagittal and coronal reconstructions were performed.    COMPARISON:  CT 02/05/2017.    FINDINGS:  There is mild nonspecific swelling/generalized soft tissue edema about the right greater than left aspect of the chin (2-91).  No drainable or rim enhancing fluid collection identified.  No definite mass or phlegmonous change.  The underlying mandible is intact without evidence of osseous destructive infectious process.    No abnormal soft tissue mass is identified within the neck. There is no evidence of pathologic lymph node enlargement.  There are few prominent cervical lymph nodes, none of which are definitively enlarged (for example there is a 1.0 cm somewhat rounded right retromandibular lymph node (2-93), which may be reactive.  There are no prior images of the neck available for direct comparison.    The soft tissues of the nasopharynx, oropharynx, hypopharynx and larynx are within normal limits. The parotid, submandibular, and thyroid glands demonstrate nothing unusual.    There is mucosal thickening of the paranasal sinuses, most pronounced at the inferior aspect of the bilateral maxillary antra and within the anterior ethmoid sinuses.  The mastoid air cells are clear.    Intracranial compartment: Partially visualized.  Mild generalized cerebral volume loss.  The proximal major branch vessels appear patent.    Lung apices are clear.  There are minor osseous degenerative changes, but no lytic or blastic lesions are evident.    There are extensive calcifications of the carotid vessels.

## 2024-03-01 NOTE — ED NOTES
Granddaughter at bedside. Discharge teaching and education discussed with patient and granddaughter - verbalized understanding.

## 2024-03-01 NOTE — HPI
Lesly Jacob is an 79 yo F with PMHx of HTN, HLD, T2DM who presented to ED for facial swelling. Patient developed R upper gum pain a few days ago so she started using a family member's benzocaine ointment. Awoke the next day with R lower facial swelling. Saw her dentist yesterday and given Kenalog dental ointment and chlorhexidene mouth wash. States that she has been unable to tolerate the chlorhexidine ointment secondary to severe burning when she tries to use it. Reports today the swelling is worse and involves her R lower lip. She states that her lips are not in pain, but endorses 10/10 pain to her gums and side of her tongue. Reports excessive saliva production. Denies difficulty swallowing, voice change, neck pain, neck stiffness, pruritis. She is edentulous on the top. Admits nausea. Denies rash to other parts of her body. Denies fever, chills, chest pain, palpitations, SOB, cough, abdominal pain, and LE swelling. Of note, she was seen in the ED on 02/20 for R arm pain, but states this has since resolved with naproxen and flexeril.     In ED: Afebrile. BP elevated to 187/66. Satting well on RA. CBC without leukocytosis or anemia. CMP only notable for K 3.3, albumin 3.3. CRP 10.6. Procal 0.12. CT soft tissue neck with nonspecific swelling/soft tissue edema about the right greater than left aspect of the chin.  No fluid collection identified or underlying mass lesion.  The mandible is intact without evidence of osseous destructive infectious process. Given zyrtec 10 mg, famotidine 20 mg, IV toradol, IV zofran and  ml. Placed in observation.

## 2024-03-02 NOTE — HOSPITAL COURSE
Patient with improvement of facial swelling. No difficulty swallowing or breathing. Discharged to continue H2 blocker therapy for 4  additional days and complete course of thrust treatment. Primary care follow up requested.     I personally saw, performed physical exam, and evaluated patient on day of discharge.

## 2024-03-02 NOTE — DISCHARGE SUMMARY
[facility] Emergency Dept  Hospital Medicine  Discharge Summary      Patient Name: Lesly Jacob  MRN: [id number]  SATISH: [id number]  Patient Class: OP- Observation  Admission Date: 2/29/2024  Hospital Length of Stay: 0 days  Discharge Date and Time: 3/1/2024  1:43 PM  Attending Physician: No att. providers found   Discharging Provider: Kam Perera MD  Primary Care Provider: Paige Hawkins MD  Hospital Medicine Team: [facility] HOSP MED  Kam Perera MD  Primary Care Team: [facility]    HPI:   Lesly Jacob is an 79 yo F with PMHx of HTN, HLD, T2DM who presented to ED for facial swelling. Patient developed R upper gum pain a few days ago so she started using a family member's benzocaine ointment. Awoke the next day with R lower facial swelling. Saw her dentist yesterday and given Kenalog dental ointment and chlorhexidene mouth wash. States that she has been unable to tolerate the chlorhexidine ointment secondary to severe burning when she tries to use it. Reports today the swelling is worse and involves her R lower lip. She states that her lips are not in pain, but endorses 10/10 pain to her gums and side of her tongue. Reports excessive saliva production. Denies difficulty swallowing, voice change, neck pain, neck stiffness, pruritis. She is edentulous on the top. Admits nausea. Denies rash to other parts of her body. Denies fever, chills, chest pain, palpitations, SOB, cough, abdominal pain, and LE swelling. Of note, she was seen in the ED on 02/20 for R arm pain, but states this has since resolved with naproxen and flexeril.     In ED: Afebrile. BP elevated to 187/66. Satting well on RA. CBC without leukocytosis or anemia. CMP only notable for K 3.3, albumin 3.3. CRP 10.6. Procal 0.12. CT soft tissue neck with nonspecific swelling/soft tissue edema about the right greater than left aspect of the chin.  No fluid collection identified or underlying mass lesion.  The mandible  is intact without evidence of osseous destructive infectious process. Given zyrtec 10 mg, famotidine 20 mg, IV toradol, IV zofran and  ml. Placed in observation.     * No surgery found *      Hospital Course:   Patient with improvement of facial swelling. No difficulty swallowing or breathing. Discharged to continue H2 blocker therapy for 4  additional days and complete course of thrust treatment. Primary care follow up requested.     I personally saw, performed physical exam, and evaluated patient on day of discharge.       Goals of Care Treatment Preferences:  Code Status: Full Code      Consults:     No new Assessment & Plan notes have been filed under this hospital service since the last note was generated.  Service: Hospital Medicine    Final Active Diagnoses:    Diagnosis Date Noted POA    PRINCIPAL PROBLEM:  Angioedema [T78.3XXA] 03/01/2024 Yes    Oral thrush [B37.0] 03/01/2024 Yes    Facial swelling [R22.0] 02/29/2024 Yes    Type 2 diabetes mellitus without complication, without long-term current use of insulin [E11.9] 11/17/2017 Yes    Other hyperlipidemia [E78.49] 11/17/2017 Yes    HTN (hypertension) [I10] 02/27/2013 Yes      Problems Resolved During this Admission:       Discharged Condition: good    Disposition: Home or Self Care    Follow Up:    Patient Instructions:      Ambulatory referral/consult to Internal Medicine   Standing Status: Future   Referral Priority: Routine Referral Type: Consultation   Referral Reason: Specialty Services Required   Requested Specialty: Internal Medicine   Number of Visits Requested: 1     Diet Adult Regular     Notify your health care provider if you experience any of the following:  increased confusion or weakness     Notify your health care provider if you experience any of the following:  persistent dizziness, light-headedness, or visual disturbances     Notify your health care provider if you experience any of the following:  worsening rash     Notify your  health care provider if you experience any of the following:  severe persistent headache     Notify your health care provider if you experience any of the following:  difficulty breathing or increased cough     Notify your health care provider if you experience any of the following:  redness, tenderness, or signs of infection (pain, swelling, redness, odor or green/yellow discharge around incision site)     Notify your health care provider if you experience any of the following:  severe uncontrolled pain     Notify your health care provider if you experience any of the following:  persistent nausea and vomiting or diarrhea     Notify your health care provider if you experience any of the following:  temperature >100.4     Activity as tolerated       Significant Diagnostic Studies: Labs: All labs within the past 24 hours have been reviewed    Pending Diagnostic Studies:       Procedure Component Value Units Date/Time    Sedimentation rate [[id number]] Collected: 02/29/24 1517    Order Status: Sent Lab Status: In process Updated: 02/29/24 1517    Specimen: Blood            Medications:  Reconciled Home Medications:      Medication List        START taking these medications      cetirizine 10 MG tablet  Commonly known as: ZYRTEC  Take 1 tablet (10 mg total) by mouth 2 (two) times a day. for 5 days     famotidine 20 MG tablet  Commonly known as: PEPCID  Take 1 tablet (20 mg total) by mouth 2 (two) times daily. for 5 days     nystatin 100,000 unit/mL suspension  Commonly known as: MYCOSTATIN  Take 5 mLs (500,000 Units total) by mouth 4 (four) times daily. for 10 days     predniSONE 20 MG tablet  Commonly known as: DELTASONE  Take 2 tablets (40 mg total) by mouth once daily. for 3 days            CHANGE how you take these medications      losartan-hydrochlorothiazide 100-25 mg 100-25 mg per tablet  Commonly known as: HYZAAR  Take 1 tablet by mouth once daily.  What changed: Another medication with the same name was  removed. Continue taking this medication, and follow the directions you see here.            CONTINUE taking these medications      aluminum-magnesium hydroxide-simethicone 200-200-20 mg/5 mL Susp  Commonly known as: MAALOX  Take 30 mLs by mouth 4 (four) times daily before meals and nightly.     amLODIPine 10 MG tablet  Commonly known as: NORVASC  take 1 tablet by Oral route 1 time per day     aspirin 81 MG EC tablet  Commonly known as: ECOTRIN  Take 81 mg by mouth once daily.     atorvastatin 20 MG tablet  Commonly known as: LIPITOR  take 1 tablet (20 mg) by oral route once daily     calcium carbonate-vitamin D3 500 mg-3.125 mcg (125 unit) per tablet  Take 1 tablet by mouth once daily.     chlorhexidine 0.12 % solution  Commonly known as: PERIDEX  SMARTSIG:By Mouth     clotrimazole 1 % cream  Commonly known as: LOTRIMIN  Apply to affected area 2 times daily     FLUoxetine 20 MG capsule  take 1 capsule (20 mg) by oral route once daily     hydrOXYzine HCL 25 MG tablet  Commonly known as: ATARAX  Take 25 mg by mouth 3 (three) times daily as needed for Itching.     ketorolac 0.5% 0.5 % Drop  Commonly known as: ACULAR     LIDOcaine 5 %  Commonly known as: LIDODERM  Place 1 patch onto the skin once daily. Remove & Discard patch within 12 hours or as directed by MD     metFORMIN 500 MG tablet  Commonly known as: GLUCOPHAGE  take 1 tablet by Oral route 2 times per day with morning and evening meals     metoprolol tartrate 25 MG tablet  Commonly known as: LOPRESSOR  Take 1 tablet (25 mg total) by mouth 2 (two) times daily.     naproxen 500 MG tablet  Commonly known as: NAPROSYN  Take 1 tablet (500 mg total) by mouth 2 (two) times daily with meals.     neomycin-bacitracin-polymyxin ointment  Commonly known as: NEOSPORIN  Apply topically 3 (three) times daily.     ofloxacin 0.3 % ophthalmic solution  Commonly known as: OCUFLOX     prednisoLONE acetate 1 % Drps  Commonly known as: PRED FORTE     triamcinolone acetonide 0.1%  0.1 % paste  Commonly known as: KENALOG  Place onto teeth.            STOP taking these medications      esomeprazole 40 MG capsule  Commonly known as: NEXIUM     gabapentin 100 MG capsule  Commonly known as: NEURONTIN            ASK your doctor about these medications      cyclobenzaprine 10 MG tablet  Commonly known as: FLEXERIL  Take 1 tablet (10 mg total) by mouth 3 (three) times daily as needed for Muscle spasms.  Ask about: Should I take this medication?              Indwelling Lines/Drains at time of discharge:   Lines/Drains/Airways       None                   Time spent on the discharge of patient: 35 minutes         Kam Perera MD  Department of Hospital Medicine  [facility] - Emergency Dept

## 2024-03-05 ENCOUNTER — PATIENT OUTREACH (OUTPATIENT)
Dept: ADMINISTRATIVE | Facility: CLINIC | Age: 79
End: 2024-03-05
Payer: MEDICARE

## 2024-03-05 NOTE — PROGRESS NOTES
C3 nurse attempted to contact Lesly Jacob for a TCC post hospital discharge follow up call. No answer. Left voicemail with callback information. The patient has a scheduled HOSFU appointment with Cheo Arrington MD on 03/11/24 @ [id number].

## 2024-03-06 ENCOUNTER — DOCUMENTATION ONLY (OUTPATIENT)
Dept: REHABILITATION | Facility: OTHER | Age: 79
End: 2024-03-06

## 2024-03-06 ENCOUNTER — TELEPHONE (OUTPATIENT)
Dept: OPHTHALMOLOGY | Facility: CLINIC | Age: 79
End: 2024-03-06
Payer: MEDICARE

## 2024-03-06 NOTE — TELEPHONE ENCOUNTER
----- Message from Anju Holloway sent at 3/6/2024  2:14 PM CST -----  Regarding: ED F/U  Patient called to schedule ED F/U.     Call back-[phone]

## 2024-03-11 ENCOUNTER — OFFICE VISIT (OUTPATIENT)
Dept: INTERNAL MEDICINE | Facility: CLINIC | Age: 79
End: 2024-03-11
Payer: MEDICARE

## 2024-03-11 VITALS
BODY MASS INDEX: 28.18 KG/M2 | OXYGEN SATURATION: 99 % | DIASTOLIC BLOOD PRESSURE: 72 MMHG | WEIGHT: 179.88 LBS | SYSTOLIC BLOOD PRESSURE: 127 MMHG | HEART RATE: 62 BPM

## 2024-03-11 DIAGNOSIS — Z09 HOSPITAL DISCHARGE FOLLOW-UP: Primary | ICD-10-CM

## 2024-03-11 DIAGNOSIS — B37.0 ORAL THRUSH: ICD-10-CM

## 2024-03-11 DIAGNOSIS — I10 HYPERTENSION, UNSPECIFIED TYPE: ICD-10-CM

## 2024-03-11 DIAGNOSIS — R22.0 FACIAL SWELLING: ICD-10-CM

## 2024-03-11 PROCEDURE — 99204 OFFICE O/P NEW MOD 45 MIN: CPT | Mod: S$GLB,,, | Performed by: STUDENT IN AN ORGANIZED HEALTH CARE EDUCATION/TRAINING PROGRAM

## 2024-03-11 PROCEDURE — 99999 PR PBB SHADOW E&M-EST. PATIENT-LVL V: CPT | Mod: PBBFAC,,, | Performed by: STUDENT IN AN ORGANIZED HEALTH CARE EDUCATION/TRAINING PROGRAM

## 2024-03-11 NOTE — PROGRESS NOTES
SUBJECTIVE     Chief Complaint   Patient presents with    Facial Swelling       HPI  Lesly Jacob is a 78 y.o. female with  T2DM, HTN, HLD, PACs, angioedema  that presents for hospital discharge follow-up.     Recent admission to observation for facial swelling.     Per discharge summary:     Lesly Jacob is an 77 yo F with PMHx of HTN, HLD, T2DM who presented to ED for facial swelling. Patient developed R upper gum pain a few days ago so she started using a family member's benzocaine ointment. Awoke the next day with R lower facial swelling. Saw her dentist yesterday and given Kenalog dental ointment and chlorhexidene mouth wash. States that she has been unable to tolerate the chlorhexidine ointment secondary to severe burning when she tries to use it. Reports today the swelling is worse and involves her R lower lip. She states that her lips are not in pain, but endorses 10/10 pain to her gums and side of her tongue. Reports excessive saliva production. Denies difficulty swallowing, voice change, neck pain, neck stiffness, pruritis. She is edentulous on the top. Admits nausea. Denies rash to other parts of her body. Denies fever, chills, chest pain, palpitations, SOB, cough, abdominal pain, and LE swelling. Of note, she was seen in the ED on 02/20 for R arm pain, but states this has since resolved with naproxen and flexeril.      In ED: Afebrile. BP elevated to 187/66. Satting well on RA. CBC without leukocytosis or anemia. CMP only notable for K 3.3, albumin 3.3. CRP 10.6. Procal 0.12. CT soft tissue neck with nonspecific swelling/soft tissue edema about the right greater than left aspect of the chin.  No fluid collection identified or underlying mass lesion.  The mandible is intact without evidence of osseous destructive infectious process. Given zyrtec 10 mg, famotidine 20 mg, IV toradol, IV zofran and  ml. Placed in observation.      * No surgery found *       Hospital Course:    Patient with improvement of facial swelling. No difficulty swallowing or breathing. Discharged to continue H2 blocker therapy for 4  additional days and complete course of thrust treatment. Primary care follow up requested.     Patient doing well today. No swelling. She has completed course of Famotidine 20 mg BID x 5 days, Zyrtec 10 mg BID x 5 days, and prednisone 40 mg qd x 3 days. No recurrence of swelling. Denies difficulty swallowing, SOB, wheezing, itching, rash.     Had been prescribed course of nystatin for oral thrush. Symptoms resolved per patient.     No other complaints today.     Sees PCP at [facility], Dr. Hawkins.     PAST MEDICAL HISTORY:  Past Medical History:   Diagnosis Date    Bronchitis     Diabetes mellitus     Hyperlipidemia     Hypertension     Pneumonia     Pneumonia        PAST SURGICAL HISTORY:  Past Surgical History:   Procedure Laterality Date    HYSTERECTOMY         FAMILY HISTORY:  Family History   Problem Relation Age of Onset    Heart failure Mother     Hypertension Mother     Heart attack Brother        ALLERGIES AND MEDICATIONS: updated and reviewed.  Review of patient's allergies indicates:   Allergen Reactions    Anbesol [benzocaine] Swelling     Oral swelling     Amoxicillin Nausea And Vomiting     Current Outpatient Medications   Medication Sig Dispense Refill    aluminum-magnesium hydroxide-simethicone (MAALOX) 200-200-20 mg/5 mL Susp Take 30 mLs by mouth 4 (four) times daily before meals and nightly. 354 mL 0    amLODIPine (NORVASC) 10 MG tablet take 1 tablet by Oral route 1 time per day      aspirin (ECOTRIN) 81 MG EC tablet Take 81 mg by mouth once daily.      atorvastatin (LIPITOR) 20 MG tablet take 1 tablet (20 mg) by oral route once daily      calcium carbonate-vitamin D3 500 mg-3.125 mcg (125 unit) per tablet Take 1 tablet by mouth once daily.        cetirizine (ZYRTEC) 10 MG tablet Take 1 tablet (10 mg total) by mouth 2 (two) times a day. for 5 days 10 tablet 0     chlorhexidine (PERIDEX) 0.12 % solution SMARTSIG:By Mouth      clotrimazole (LOTRIMIN) 1 % cream Apply to affected area 2 times daily 60 g 2    famotidine (PEPCID) 20 MG tablet Take 1 tablet (20 mg total) by mouth 2 (two) times daily. for 5 days 10 tablet 0    FLUoxetine (PROZAC) 20 MG capsule take 1 capsule (20 mg) by oral route once daily      hydrOXYzine HCl (ATARAX) 25 MG tablet Take 25 mg by mouth 3 (three) times daily as needed for Itching.      ketorolac 0.5% (ACULAR) 0.5 % Drop       LIDOcaine (LIDODERM) 5 % Place 1 patch onto the skin once daily. Remove & Discard patch within 12 hours or as directed by MD 30 patch 0    losartan-hydrochlorothiazide 100-25 mg (HYZAAR) 100-25 mg per tablet Take 1 tablet by mouth once daily.       metFORMIN (GLUCOPHAGE) 500 MG tablet take 1 tablet by Oral route 2 times per day with morning and evening meals      metoprolol tartrate (LOPRESSOR) 25 MG tablet Take 1 tablet (25 mg total) by mouth 2 (two) times daily. 60 tablet 0    naproxen (NAPROSYN) 500 MG tablet Take 1 tablet (500 mg total) by mouth 2 (two) times daily with meals. 20 tablet 0    neomycin-bacitracin-polymyxin (NEOSPORIN) ointment Apply topically 3 (three) times daily.      nystatin (MYCOSTATIN) 100,000 unit/mL suspension Take 5 mLs (500,000 Units total) by mouth 4 (four) times daily. for 10 days 200 mL 0    ofloxacin (OCUFLOX) 0.3 % ophthalmic solution       prednisoLONE acetate (PRED FORTE) 1 % DrpS       triamcinolone acetonide 0.1% (KENALOG) 0.1 % paste Place onto teeth.       No current facility-administered medications for this visit.       ROS  Review of Systems   Constitutional:  Negative for activity change, chills and fever.   HENT:  Negative for congestion and hearing loss.    Eyes:  Negative for pain and visual disturbance.   Respiratory:  Negative for cough and shortness of breath.    Cardiovascular:  Negative for chest pain and palpitations.   Gastrointestinal:  Negative for abdominal pain,  constipation, diarrhea, nausea and vomiting.   Endocrine: Negative.    Genitourinary: Negative.    Musculoskeletal:  Negative for arthralgias and myalgias.   Skin: Negative.    Allergic/Immunologic: Negative.    Neurological:  Negative for dizziness, light-headedness and headaches.   Hematological: Negative.          OBJECTIVE     Physical Exam  There were no vitals filed for this visit. There is no height or weight on file to calculate BMI.            Physical Exam  Vitals reviewed.   Constitutional:       General: She is not in acute distress.     Appearance: Normal appearance.   HENT:      Head: Normocephalic and atraumatic.      Jaw: No swelling.      Comments: No swelling appreciated over face.      Mouth/Throat:      Mouth: Mucous membranes are moist.      Pharynx: Oropharynx is clear.      Comments: Edentulous in the maxillary jaw  Eyes:      Extraocular Movements: Extraocular movements intact.      Conjunctiva/sclera: Conjunctivae normal.      Pupils: Pupils are equal, round, and reactive to light.   Cardiovascular:      Rate and Rhythm: Normal rate and regular rhythm.      Pulses: Normal pulses.      Heart sounds: Normal heart sounds.   Pulmonary:      Effort: Pulmonary effort is normal.      Breath sounds: Normal breath sounds.   Abdominal:      General: There is no distension.   Musculoskeletal:         General: Normal range of motion.      Cervical back: Normal range of motion and neck supple.   Skin:     General: Skin is warm and dry.   Neurological:      General: No focal deficit present.      Mental Status: She is alert.           Health Maintenance         Date Due Completion Date    Diabetes Urine Screening Never done ---    Pneumococcal Vaccines (Age 65+) (1 of 2 - PCV) Never done ---    Eye Exam Never done ---    TETANUS VACCINE Never done ---    DEXA Scan Never done ---    Shingles Vaccine (1 of 2) Never done ---    Lipid Panel 04/24/2024 4/24/2023    Hemoglobin A1c 09/01/2024 3/1/2024               ASSESSMENT     78 y.o. female with     1. Hospital discharge follow-up    2. Facial swelling    3. Oral thrush    4. Hypertension, unspecified type        PLAN:     1. Hospital discharge follow-up  - Discharge summary reviewed, discharge medication reconciliation reviewed with patient in clinic today.    2. Facial swelling  - No swelling appreciated on exam today.   - Recommend continuing 2nd generation antihistamine.   - Return precautions given. Patient verbalized understanding.   - Ambulatory referral/consult to Internal Medicine  - Ambulatory referral/consult to Allergy; Future    3. Oral thrush  - Resolved after course of Nystatin.     4. Hypertension, unspecified type  - Controlled on current regimen. Continue.         RTC PRN, can follow up with PCP       Cheo Arrington MD  Family Medicine  [facility]  03/11/2024    This document was created using voice recognition software ([employer]). Although it may be edited, this document may contain errors related to incorrect recognition of the spoken word. Please call the physician if clarification is needed.       No follow-ups on file.

## 2024-03-11 NOTE — PATIENT INSTRUCTIONS
I recommend taking a daily antihistamine: Zyrtec, Allegra, Claritin, or Xyzal (choose one of these medications, take once a day. They are are available over the counter).     Recommend following up with an Allergy specialist for further evaluation.

## 2024-03-18 ENCOUNTER — DOCUMENTATION ONLY (OUTPATIENT)
Dept: ALLERGY | Facility: CLINIC | Age: 79
End: 2024-03-18
Payer: MEDICARE

## 2024-03-18 ENCOUNTER — OFFICE VISIT (OUTPATIENT)
Dept: ALLERGY | Facility: CLINIC | Age: 79
End: 2024-03-18
Payer: MEDICARE

## 2024-03-18 VITALS — WEIGHT: 177.5 LBS | BODY MASS INDEX: 27.86 KG/M2 | HEIGHT: 67 IN

## 2024-03-18 DIAGNOSIS — E78.49 OTHER HYPERLIPIDEMIA: ICD-10-CM

## 2024-03-18 DIAGNOSIS — R22.0 FACIAL SWELLING: ICD-10-CM

## 2024-03-18 DIAGNOSIS — I10 HYPERTENSION, UNSPECIFIED TYPE: ICD-10-CM

## 2024-03-18 DIAGNOSIS — E11.9 TYPE 2 DIABETES MELLITUS WITHOUT COMPLICATION, WITHOUT LONG-TERM CURRENT USE OF INSULIN: ICD-10-CM

## 2024-03-18 DIAGNOSIS — M15.9 PRIMARY OSTEOARTHRITIS INVOLVING MULTIPLE JOINTS: ICD-10-CM

## 2024-03-18 DIAGNOSIS — L25.9 CONTACT DERMATITIS, UNSPECIFIED CONTACT DERMATITIS TYPE, UNSPECIFIED TRIGGER: Primary | ICD-10-CM

## 2024-03-18 PROCEDURE — 99999 PR PBB SHADOW E&M-EST. PATIENT-LVL IV: CPT | Mod: PBBFAC,,, | Performed by: ALLERGY & IMMUNOLOGY

## 2024-03-18 PROCEDURE — 99205 OFFICE O/P NEW HI 60 MIN: CPT | Mod: S$GLB,,, | Performed by: ALLERGY & IMMUNOLOGY
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
## 2024-04-03 ENCOUNTER — TELEPHONE (OUTPATIENT)
Dept: DERMATOLOGY | Facility: CLINIC | Age: 79
End: 2024-04-03
Payer: MEDICARE

## 2024-04-03 NOTE — TELEPHONE ENCOUNTER
Called and spoke to pt regarding rescheduling appt in July due to Dr. Guerrero being out of office. Pt stated she is no longer having issues with her skin and to cancel her appointment. Informed pt to call office back if anything changes to get rescheduled.

## 2024-04-18 ENCOUNTER — HOSPITAL ENCOUNTER (EMERGENCY)
Facility: HOSPITAL | Age: 79
Discharge: HOME | End: 2024-04-19
Attending: EMERGENCY MEDICINE
Payer: MEDICARE

## 2024-04-18 DIAGNOSIS — R42 DIZZINESS: ICD-10-CM

## 2024-04-18 DIAGNOSIS — E86.0 DEHYDRATION: Primary | ICD-10-CM

## 2024-04-18 DIAGNOSIS — R53.81 MALAISE: ICD-10-CM

## 2024-04-18 LAB
ALBUMIN SERPL BCP-MCNC: 3.6 G/DL (ref 3.5–5.2)
ALP SERPL-CCNC: 75 U/L (ref 55–135)
ALT SERPL W/O P-5'-P-CCNC: 12 U/L (ref 10–44)
ANION GAP SERPL CALC-SCNC: 11 MMOL/L (ref 8–16)
AST SERPL-CCNC: 18 U/L (ref 10–40)
BASOPHILS # BLD AUTO: 0.07 K/UL (ref 0–0.2)
BASOPHILS NFR BLD: 0.8 % (ref 0–1.9)
BILIRUB SERPL-MCNC: 0.4 MG/DL (ref 0.1–1)
BUN SERPL-MCNC: 35 MG/DL (ref 8–23)
CALCIUM SERPL-MCNC: 9.3 MG/DL (ref 8.7–10.5)
CHLORIDE SERPL-SCNC: 104 MMOL/L (ref 95–110)
CO2 SERPL-SCNC: 23 MMOL/L (ref 23–29)
CREAT SERPL-MCNC: 1.5 MG/DL (ref 0.5–1.4)
DIFFERENTIAL METHOD BLD: ABNORMAL
EOSINOPHIL # BLD AUTO: 0.2 K/UL (ref 0–0.5)
EOSINOPHIL NFR BLD: 2 % (ref 0–8)
ERYTHROCYTE [DISTWIDTH] IN BLOOD BY AUTOMATED COUNT: 13.2 % (ref 11.5–14.5)
EST. GFR  (NO RACE VARIABLE): 35 ML/MIN/1.73 M^2
GLUCOSE SERPL-MCNC: 104 MG/DL (ref 70–110)
HCT VFR BLD AUTO: 37.4 % (ref 37–48.5)
HGB BLD-MCNC: 12.3 G/DL (ref 12–16)
IMM GRANULOCYTES # BLD AUTO: 0.05 K/UL (ref 0–0.04)
IMM GRANULOCYTES NFR BLD AUTO: 0.5 % (ref 0–0.5)
LYMPHOCYTES # BLD AUTO: 3.2 K/UL (ref 1–4.8)
LYMPHOCYTES NFR BLD: 34.3 % (ref 18–48)
MAGNESIUM SERPL-MCNC: 2.1 MG/DL (ref 1.6–2.6)
MCH RBC QN AUTO: 28.7 PG (ref 27–31)
MCHC RBC AUTO-ENTMCNC: 32.9 G/DL (ref 32–36)
MCV RBC AUTO: 87 FL (ref 82–98)
MONOCYTES # BLD AUTO: 0.8 K/UL (ref 0.3–1)
MONOCYTES NFR BLD: 8.9 % (ref 4–15)
NEUTROPHILS # BLD AUTO: 5 K/UL (ref 1.8–7.7)
NEUTROPHILS NFR BLD: 53.5 % (ref 38–73)
NRBC BLD-RTO: 0 /100 WBC
PLATELET # BLD AUTO: 358 K/UL (ref 150–450)
PMV BLD AUTO: 9 FL (ref 9.2–12.9)
POTASSIUM SERPL-SCNC: 3.3 MMOL/L (ref 3.5–5.1)
PROT SERPL-MCNC: 8.2 G/DL (ref 6–8.4)
RBC # BLD AUTO: 4.29 M/UL (ref 4–5.4)
SODIUM SERPL-SCNC: 138 MMOL/L (ref 136–145)
TROPONIN I SERPL DL<=0.01 NG/ML-MCNC: 0.01 NG/ML (ref 0–0.03)
WBC # BLD AUTO: 9.33 K/UL (ref 3.9–12.7)

## 2024-04-18 PROCEDURE — 99285 EMERGENCY DEPT VISIT HI MDM: CPT | Mod: 25

## 2024-04-18 PROCEDURE — 84443 ASSAY THYROID STIM HORMONE: CPT | Performed by: EMERGENCY MEDICINE

## 2024-04-18 PROCEDURE — 94761 N-INVAS EAR/PLS OXIMETRY MLT: CPT

## 2024-04-18 PROCEDURE — 84484 ASSAY OF TROPONIN QUANT: CPT | Performed by: EMERGENCY MEDICINE

## 2024-04-18 PROCEDURE — 93005 ELECTROCARDIOGRAM TRACING: CPT

## 2024-04-18 PROCEDURE — 93010 ELECTROCARDIOGRAM REPORT: CPT | Mod: ,,, | Performed by: INTERNAL MEDICINE

## 2024-04-18 PROCEDURE — 85025 COMPLETE CBC W/AUTO DIFF WBC: CPT | Performed by: EMERGENCY MEDICINE

## 2024-04-18 PROCEDURE — 80053 COMPREHEN METABOLIC PANEL: CPT | Performed by: EMERGENCY MEDICINE

## 2024-04-18 PROCEDURE — 83735 ASSAY OF MAGNESIUM: CPT | Performed by: EMERGENCY MEDICINE

## 2024-04-18 PROCEDURE — 81001 URINALYSIS AUTO W/SCOPE: CPT | Performed by: EMERGENCY MEDICINE

## 2024-04-19 VITALS
DIASTOLIC BLOOD PRESSURE: 67 MMHG | SYSTOLIC BLOOD PRESSURE: 151 MMHG | RESPIRATION RATE: 18 BRPM | BODY MASS INDEX: 27.72 KG/M2 | HEART RATE: 57 BPM | WEIGHT: 177 LBS | TEMPERATURE: 98 F | OXYGEN SATURATION: 95 %

## 2024-04-19 LAB
BACTERIA #/AREA URNS AUTO: NORMAL /HPF
BILIRUB UR QL STRIP: NEGATIVE
CLARITY UR REFRACT.AUTO: CLEAR
COLOR UR AUTO: YELLOW
GLUCOSE UR QL STRIP: NEGATIVE
HGB UR QL STRIP: NEGATIVE
HYALINE CASTS UR QL AUTO: 1 /LPF
KETONES UR QL STRIP: NEGATIVE
LEUKOCYTE ESTERASE UR QL STRIP: ABNORMAL
MICROSCOPIC COMMENT: NORMAL
NITRITE UR QL STRIP: NEGATIVE
OHS QRS DURATION: 86 MS
OHS QTC CALCULATION: 427 MS
PH UR STRIP: 5 [PH] (ref 5–8)
PROT UR QL STRIP: NEGATIVE
RBC #/AREA URNS AUTO: 1 /HPF (ref 0–4)
SP GR UR STRIP: 1.01 (ref 1–1.03)
SQUAMOUS #/AREA URNS AUTO: 1 /HPF
TSH SERPL DL<=0.005 MIU/L-ACNC: 0.7 UIU/ML (ref 0.4–4)
URN SPEC COLLECT METH UR: ABNORMAL
WBC #/AREA URNS AUTO: 2 /HPF (ref 0–5)

## 2024-04-19 PROCEDURE — 25000003 PHARM REV CODE 250: Performed by: EMERGENCY MEDICINE

## 2024-04-19 PROCEDURE — 96360 HYDRATION IV INFUSION INIT: CPT

## 2024-04-19 RX ORDER — ONDANSETRON 4 MG/1
4 TABLET, ORALLY DISINTEGRATING ORAL
Status: COMPLETED | OUTPATIENT
Start: 2024-04-19 | End: 2024-04-19

## 2024-04-19 RX ORDER — ONDANSETRON 4 MG/1
4 TABLET, ORALLY DISINTEGRATING ORAL EVERY 6 HOURS PRN
Qty: 30 TABLET | Refills: 0 | Status: SHIPPED | OUTPATIENT
Start: 2024-04-19

## 2024-04-19 RX ADMIN — SODIUM CHLORIDE 500 ML: 9 INJECTION, SOLUTION INTRAVENOUS at 01:04

## 2024-04-19 RX ADMIN — ONDANSETRON 4 MG: 4 TABLET, ORALLY DISINTEGRATING ORAL at 01:04

## 2024-04-19 NOTE — DISCHARGE INSTRUCTIONS
Your symptoms are related to decreased oral intake.  Please increase your water intake.  Please take Zofran as needed for nausea.  Follow up with primary care return the ER for any concerning reason.    Thank you for coming in to see us at [facility] It was nice to meet you, and I hope you feel better soon. Please feel free to return to the ER at any time should your symptoms get worse, or if you have different emergent concerns.    Our goal in the emergency department is to always give you outstanding care and exceptional service. You may receive a survey by mail or e-mail in the next week regarding your experience in our ED. We would greatly appreciate your completing and returning the survey. Your feedback provides us with a way to recognize our staff who give very good care and it helps us learn how to improve when your experience was below our aspiration of excellence.      It is important to remember that some problems or medical conditions are difficult to diagnose and may not be found or addressed during your Emergency Department visit.  These conditions often start with non-specific symptoms and can only be diagnosed on follow up visits with your primary care physician or specialist when the symptoms continue or change. Please remember that all medical conditions can change, and we cannot predict how you will be feeling tomorrow or the next day.    Return to the ER with any questions/concerns, new/concerning symptoms, worsening, failure to improve or inability to obtain follow-up.       Be sure to follow up with your primary care doctor and review all labs/imaging/tests that were performed during your ER visit with them. It is very common for us to identify non-emergent incidental findings which must be followed up with your primary care physician.  Some labs/imaging/tests may be outside of the normal range, and require non-emergent follow-up and/or further  investigation/treatment/procedures/testing to help diagnose/exclude/prevent complications or other potentially serious medical conditions. Some abnormalities may not have been discussed or addressed during your ER visit. Some lab results may not return during your ER visit but can be accessible by downloading the free [facility] issac or by visiting [email or web address] . It is important for you to review all labs/imaging/tests which are outside of the normal range with your physician.    An ER visit does not replace a primary care visit, and many screening tests or follow-up tests cannot be ordered by an ER doctor or performed by the ER. Some tests may even require pre-approval.    If you do not have a primary care doctor, you may contact the one listed on your discharge paperwork or you may also call the [facility] Appointment Desk at [phone] , or [employer] at  [phone] to schedule an appointment, or establish care with a primary care doctor or even a specialist and to obtain information about local resources. It is important to your health that you have a primary care doctor.    Please take all medications as directed. We have done our best to select a medication for you that will treat your condition however, all medications may potentially have side-effects and it is impossible to predict which medications may give you side-effects or what those side-effects (if any) those medications may give you.  If you feel that you are having a negative effect or side-effect of any medication you should stop taking those medications immediately and seek medical attention. If you feel that you are having a life-threatening reaction call [phone].        Do not drive, swim, climb to height, take a bath, operate heavy machinery, drink alcohol or take potentially sedating medications, sign any legal documents or make any important decisions for 24 hours if you have received any pain medications, sedatives or  mood altering drugs during your ER visit or within 24 hours of taking them if they have been prescribed to you.     You can find additional resources for Dentists, hearing aids, durable medical equipment, low cost pharmacies and other resources at [email or web address]

## 2024-04-19 NOTE — PROVIDER PROGRESS NOTES - EMERGENCY DEPT.
Encounter Date: 4/18/2024    ED Physician Progress Notes        Received sign-out from previous team plan was follow-up workup and reassess patient.  Patient resting comfortably in bed no acute distress.  She reports that she has been experiencing intermittent dizziness, but it sounds more like near-syncope.  She denies sensation the room is spinning she reports when she goes from seated to standing she feels unwell she also endorses some nausea and decreased oral intake.  Labs obtained and reviewed.  Slight MERLENE noted creatinine 1.5 from baseline around 0.9 likely from slight dehydration.  IV fluids Zofran given.  Will observe, anticipate discharge.  Discussed with patient need to increase p.o. intake

## 2024-04-19 NOTE — ED PROVIDER NOTES
Encounter Date: 4/18/2024       History     Chief Complaint   Patient presents with    Dizziness     Intermittent dizziness x 2 weeks. Reports generalized weakness. +headache      Patient is an 78-year-old female with past medical history of angioedema, diabetes mellitus, hypertension, hyperlipidemia, pneumonia, hysterectomy who presents with complaint of dizziness.  Patient tells me she has not been feeling well when she was getting up from sitting to standing.  She notes in the past she was always gotten up in the morning and sat on the side of the bed to prevent feeling lightheaded.  This normally works for her.  However the past 1 week she was not feeling well.  She has a hard time describing what that feeling is.  She notes she was not lightheaded.  She also states that the room is not spinning and she is not having difficulty walking.  She denies chest pain, shortness of breath.  She does note a mild headache and nausea.  She falso notes a decreased appetite. She has not had trauma, and she has not fainted. She denies vision changes, weakness, numbness, difficulty speaking, word finding difficulty.    The history is provided by the patient and medical records.   Past medical history  Review of patient's allergies indicates:   Allergen Reactions    Anbesol [benzocaine] Swelling     Oral swelling     Amoxicillin Nausea And Vomiting     Past Medical History:   Diagnosis Date    Allergy     Angio-edema     Bronchitis     Diabetes mellitus     Hyperlipidemia     Hypertension     Pneumonia     Pneumonia      Past Surgical History:   Procedure Laterality Date    HYSTERECTOMY      SINUS SURGERY       Family History   Problem Relation Name Age of Onset    Heart failure Mother      Hypertension Mother      Heart attack Brother       Social History     Tobacco Use    Smoking status: Never     Passive exposure: Never    Smokeless tobacco: Never   Substance Use Topics    Alcohol use: No    Drug use: No         Physical  Exam     Initial Vitals [04/18/24 1948]   BP Pulse Resp Temp SpO2   (!) 188/75 68 16 98.1 °F (36.7 °C) 97 %      MAP       --         Physical Exam    Nursing note and vitals reviewed.  Constitutional: She appears well-developed and well-nourished. She is not diaphoretic. No distress.   HENT:   Head: Normocephalic and atraumatic.   Right Ear: External ear normal.   Left Ear: External ear normal.   Eyes: Conjunctivae and EOM are normal. Pupils are equal, round, and reactive to light.   Neck: Neck supple.   Normal range of motion.  Cardiovascular:  Normal rate, regular rhythm and intact distal pulses.           Pulmonary/Chest: No respiratory distress.   Abdominal: Abdomen is soft. She exhibits no distension. There is no abdominal tenderness.   Musculoskeletal:         General: No edema. Normal range of motion.      Cervical back: Normal range of motion and neck supple.     Neurological: She is alert and oriented to person, place, and time. GCS score is 15. GCS eye subscore is 4. GCS verbal subscore is 5. GCS motor subscore is 6.   Skin: Skin is warm and dry.   Psychiatric: She has a normal mood and affect. Her behavior is normal. Judgment and thought content normal.         ED Course   Procedures  Labs Reviewed   COMPREHENSIVE METABOLIC PANEL - Abnormal; Notable for the following components:       Result Value    Potassium 3.3 (*)     BUN 35 (*)     Creatinine 1.5 (*)     eGFR 35.0 (*)     All other components within normal limits   CBC W/ AUTO DIFFERENTIAL - Abnormal; Notable for the following components:    MPV 9.0 (*)     Immature Grans (Abs) 0.05 (*)     All other components within normal limits   URINALYSIS, REFLEX TO URINE CULTURE - Abnormal; Notable for the following components:    Leukocytes, UA Trace (*)     All other components within normal limits    Narrative:     Specimen Source->Urine   MAGNESIUM   TSH   TROPONIN I   URINALYSIS MICROSCOPIC    Narrative:     Specimen Source->Urine     EKG Readings:  (Independently Interpreted)   Initial Reading: No STEMI. Previous EKG: Compared with most recent EKG Rhythm: Normal Sinus Rhythm. Heart Rate: 68. Ectopy: PACs. Axis: Normal.     ECG Results              EKG 12-lead (Final result)        Collection Time Result Time QRS Duration OHS QTC Calculation    04/18/24 20:03:54 04/19/24 10:58:39 86 [id number]                     Final result by Interface, Lab In [facility] (04/19/24 10:58:47)                   Narrative:    Test Reason : R42,    Vent. Rate : 068 BPM     Atrial Rate : 068 BPM     P-R Int : 170 ms          QRS Dur : 086 ms      QT Int : 402 ms       P-R-T Axes : 054 049 029 degrees     QTc Int : 427 ms    Sinus rhythm with Premature atrial complexes  Abnormal R wave progression  Cannot exclude Anterior infarct ,age undetermined  Abnormal ECG  When compared with ECG of 20-FEB-2024 07:49,  Premature atrial complexes are now Present  Nonspecific T wave abnormality no longer evident in Lateral leads  Confirmed by ROSHAN STORM MD ([id number]) on 4/19/2024 10:58:37 AM    Referred By: [email or web address]   SELF           Confirmed By:ROSHAN STORM MD                                  Imaging Results              CT Head Without Contrast (Final result)  Result time 04/18/24 23:55:19      Final result by Ayaz Molina MD (04/18/24 23:55:19)                   Impression:      No acute intracranial process.      Electronically signed by: Ayaz Molina  Date:    04/18/2024  Time:    23:55               Narrative:    EXAMINATION:  CT HEAD WITHOUT CONTRAST    CLINICAL HISTORY:  Dizziness, persistent/recurrent, cardiac or vascular cause suspected;    TECHNIQUE:  Low dose axial CT images obtained throughout the head without intravenous contrast. Sagittal and coronal reconstructions were performed.    COMPARISON:  02/05/2017    FINDINGS:  Intracranial compartment:    Ventricles and sulci are normal in size for age without evidence of hydrocephalus. No extra-axial blood or fluid  collections.    Moderate involutional changes and chronic microvascular ischemic changes in the periventricular white matter.  No parenchymal mass, hemorrhage, edema or major vascular distribution infarct.    Skull/extracranial contents (limited evaluation): No fracture. Mastoid air cells and paranasal sinuses are essentially clear.                                       X-Ray Chest AP Portable (Final result)  Result time 04/18/24 23:08:16      Final result by Ayaz Molina MD (04/18/24 23:08:16)                   Impression:      No acute cardiopulmonary finding.    Electronically signed by resident: Manjit Perla  Date:    04/18/2024  Time:    22:46    Electronically signed by: Ayaz Molina  Date:    04/18/2024  Time:    23:08               Narrative:    EXAMINATION:  XR CHEST AP PORTABLE    CLINICAL HISTORY:  malaise;    TECHNIQUE:  Single frontal view of the chest was performed.    COMPARISON:  Chest radiograph 02/05/2020    FINDINGS:  Mediastinal structures midline.  Stable prominence of the cardiac silhouette.  Aortic arch calcification.    Lungs clear without consolidation.  No pleural effusion or pneumothorax.    No significant osseous abnormality.                                       Medications   ondansetron disintegrating tablet 4 mg (4 mg Oral Given 4/19/24 0132)   sodium chloride 0.9% bolus 500 mL 500 mL (0 mLs Intravenous Stopped 4/19/24 0245)     Medical Decision Making  DDX: orthostatic hypotension, vertigo, UTI, electrolyte abnormality, dehydration, malignancy    Patient with vague symptoms but essentially not feeling well when she gets up and has no focal neurologic symptoms or gait disturbance  CT head to evaluate HA and nausea along with labs for anemia, signs of MERLENE, electrolyte disturbance   Labs, UA, CT pending at change of shift  Care will be transferred to Dr. Rosas.         Amount and/or Complexity of Data Reviewed  Labs: ordered.  Radiology: ordered.                                       Clinical Impression:  Final diagnoses:  [R42] Dizziness  [R53.81] Malaise  [E86.0] Dehydration (Primary)          ED Disposition Condition    Discharge Stable          ED Prescriptions       Medication Sig Dispense Start Date End Date Auth. Provider    ondansetron (ZOFRAN-ODT) 4 MG TbDL Take 1 tablet (4 mg total) by mouth every 6 (six) hours as needed (n/v). 30 tablet 4/19/2024 -- Viviane Rosas MD          Follow-up Information       Follow up With Specialties Details Why Contact Info    Paige Hawkins MD Cardiology Schedule an appointment as soon as possible for a visit  As needed [address]  [facility] [address]  [phone]               Shelly Murrieta MD  04/19/24 3507

## 2024-04-19 NOTE — ED TRIAGE NOTES
Lesly Jacob, a 78 y.o. female presents to the ED w/ complaint of dizziness and HA x2 weeks. Pt reports intermittent dizziness throughout the day but mostly in the morning for past 2 weeks. Pt reports she feels faint when she gets dizzy. HA are intermittent  and usually occurred after dizziness. Also reports has not been eating as much as usual the past 3 days. Denies N/V, falls, CP, SOB, and fever.     Triage note:  Chief Complaint   Patient presents with    Dizziness     Intermittent dizziness x 2 weeks. Reports generalized weakness. +headache      Review of patient's allergies indicates:   Allergen Reactions    Anbesol [benzocaine] Swelling     Oral swelling     Amoxicillin Nausea And Vomiting     Past Medical History:   Diagnosis Date    Allergy     Angio-edema     Bronchitis     Diabetes mellitus     Hyperlipidemia     Hypertension     Pneumonia     Pneumonia

## 2024-05-11 ENCOUNTER — HOSPITAL ENCOUNTER (EMERGENCY)
Facility: HOSPITAL | Age: 79
Discharge: HOME | End: 2024-05-11
Attending: EMERGENCY MEDICINE
Payer: MEDICARE

## 2024-05-11 VITALS
HEIGHT: 66 IN | SYSTOLIC BLOOD PRESSURE: 168 MMHG | DIASTOLIC BLOOD PRESSURE: 71 MMHG | BODY MASS INDEX: 28.93 KG/M2 | TEMPERATURE: 98 F | WEIGHT: 180 LBS | OXYGEN SATURATION: 99 % | HEART RATE: 75 BPM | RESPIRATION RATE: 20 BRPM

## 2024-05-11 DIAGNOSIS — K12.0 APHTHOUS ULCER OF MOUTH: Primary | ICD-10-CM

## 2024-05-11 PROCEDURE — 99284 EMERGENCY DEPT VISIT MOD MDM: CPT

## 2024-05-11 RX ORDER — TRIAMCINOLONE ACETONIDE 1 MG/G
PASTE DENTAL 3 TIMES DAILY
Qty: 5 G | Refills: 0 | Status: SHIPPED | OUTPATIENT
Start: 2024-05-11 | End: 2024-06-10

## 2024-05-11 RX ORDER — DEXTROMETHORPHAN HYDROBROMIDE, GUAIFENESIN 5; 100 MG/5ML; MG/5ML
650 LIQUID ORAL EVERY 8 HOURS PRN
Qty: 20 TABLET | Refills: 0 | Status: SHIPPED | OUTPATIENT
Start: 2024-05-11

## 2024-05-11 NOTE — DISCHARGE INSTRUCTIONS
Apply topical Kenalog paste to ulcers 3 times daily    You may use magic mouthwash for pain relief    You may also take Tylenol for pain relief    If your symptoms worsen or do not improve please follow-up with your primary care provider or return to the emergency department immediately for further evaluation

## 2024-05-11 NOTE — ED PROVIDER NOTES
Encounter Date: 5/11/2024       History     Chief Complaint   Patient presents with    Mouth Lesions     78 y.o. Female with a PMHx of DM, HTN, HLD presents to the ED with mouth sores. She has two, one on her tongue and the other on her inner lip. She noticed them approximately one week ago. She is unsure of inciting events or prior episodes. She has not tried OTC pain relievers. She otherwise feels well and denies fever, malaise, new medications, rash, sore throat.     The history is provided by the patient.     Review of patient's allergies indicates:   Allergen Reactions    Anbesol [benzocaine] Swelling     Oral swelling     Amoxicillin Nausea And Vomiting     Past Medical History:   Diagnosis Date    Allergy     Angio-edema     Bronchitis     Diabetes mellitus     Hyperlipidemia     Hypertension     Pneumonia     Pneumonia      Past Surgical History:   Procedure Laterality Date    EYE SURGERY      HYSTERECTOMY      SINUS SURGERY       Family History   Problem Relation Name Age of Onset    Heart failure Mother      Hypertension Mother      Heart attack Brother       Social History     Tobacco Use    Smoking status: Never     Passive exposure: Never    Smokeless tobacco: Never   Substance Use Topics    Alcohol use: No    Drug use: No     Review of Systems   Constitutional:  Negative for chills, fatigue and fever.   HENT:  Positive for mouth sores. Negative for sore throat.    Skin:  Negative for rash.   Neurological:  Negative for weakness.       Physical Exam     Initial Vitals [05/11/24 0739]   BP Pulse Resp Temp SpO2   (!) 168/71 75 20 98.2 °F (36.8 °C) 99 %      MAP       --         Physical Exam    Nursing note and vitals reviewed.  Constitutional: She appears well-developed and well-nourished. She is not diaphoretic. No distress.   HENT:   Head: Normocephalic and atraumatic.   Nose: Nose normal.   Mouth/Throat: Uvula is midline and oropharynx is clear and moist. Oral lesions present. No uvula swelling. No  posterior oropharyngeal edema.   Flat based ulcerations, one to lateral right side of tongue. Additional ulceration slightly left of midline to inner mucosa of lip.    Eyes: Conjunctivae and EOM are normal.   Neck: Neck supple.   Cardiovascular:  Normal rate.           Pulmonary/Chest: No respiratory distress.   Musculoskeletal:      Cervical back: Neck supple.     Neurological: She is alert and oriented to person, place, and time. Gait normal.   Skin: No rash noted.   Psychiatric: She has a normal mood and affect. Thought content normal.         ED Course   Procedures  Labs Reviewed - No data to display       Imaging Results    None          Medications - No data to display  Medical Decision Making  78 y.o. Female with a PMHx of DM, HTN, HLD presents to the ED with mouth sores. Nontoxic appearing. Hemodynamically stable. Afebrile. Exam as above. I will initiate workup and reassess.      Ddx:  Aphthous ulcerations, thrush, SJS, Behcets syndrome     On examination, she has two flat based and shallow ulcerations approximately 5 mm each.  Based off of exam, most likely Aphthous ulcerations. She denies pervious episodes or triggering factors.  She's  had thrush in the past though this feels different.  There is no white coating in her mouth or tongue to suggest thrush.  She otherwise feels well.  She denies sore throat or difficulty swallowing. She denies new medications or systemic rash concerning for SJS.  Involvement is localized to her mouth with only two small ulcers and as stated previously and she denies prior episodes concerning for Behcets. Symptomatic care sent to pharmacy and choice including oral triamcinolone and magic mouthwash. Strict ED precautions given to return immediately for new, worsening, or concerning symptoms including but not limited to worsening of her pain, systemic rash, increase in the amount of ulcerations, if her symptoms do not improve      Risk  OTC drugs.  Prescription drug  management.                                      Clinical Impression:  Final diagnoses:  [K12.0] Aphthous ulcer of mouth (Primary)          ED Disposition Condition    Discharge Stable          ED Prescriptions       Medication Sig Dispense Start Date End Date Auth. Provider    triamcinolone acetonide 0.1% (KENALOG) 0.1 % paste Place onto teeth 3 (three) times daily. Apply a small amount to the lesion 2 to 4 times daily until healed; do not rinse afterwards and avoid eating or drinking for 30 minutes after application 5 g 5/11/2024 6/10/2024 Kaia Sheppard PA-C    diphenhydrAMINE-aluminum-magnesium hydroxide-simethicone-LIDOcaine viscous HCl 2% Swish and spit 15 mLs every 4 (four) hours as needed (mouth pain). 200 each 5/11/2024 -- Kaia Sheppard PA-C    acetaminophen (TYLENOL) 650 MG TbSR Take 1 tablet (650 mg total) by mouth every 8 (eight) hours as needed (mouth pain). 20 tablet 5/11/2024 -- Kaia Sheppard PA-C          Follow-up Information       Follow up With Specialties Details Why Contact Info    Paige Hawkins MD Cardiology  As needed [address]  [facility] [address]  [phone]      [facility] - Emergency Dept Emergency Medicine  If symptoms worsen [address]  [facility] [address] [phone]             Kaia Sheppard PA-C  05/11/24 8667

## 2024-06-15 ENCOUNTER — HOSPITAL ENCOUNTER (EMERGENCY)
Facility: HOSPITAL | Age: 79
Discharge: HOME | End: 2024-06-15
Attending: EMERGENCY MEDICINE
Payer: MEDICARE

## 2024-06-15 VITALS
HEIGHT: 64 IN | RESPIRATION RATE: 18 BRPM | WEIGHT: 180 LBS | DIASTOLIC BLOOD PRESSURE: 64 MMHG | BODY MASS INDEX: 30.73 KG/M2 | HEART RATE: 70 BPM | SYSTOLIC BLOOD PRESSURE: 129 MMHG | OXYGEN SATURATION: 97 % | TEMPERATURE: 98 F

## 2024-06-15 DIAGNOSIS — M25.551 HIP PAIN, RIGHT: Primary | ICD-10-CM
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
## 2024-06-15 PROBLEM — H25.12 NUCLEAR SCLEROTIC CATARACT OF LEFT EYE: Status: ACTIVE | Noted: 2024-03-29

## 2024-06-15 PROCEDURE — 99283 EMERGENCY DEPT VISIT LOW MDM: CPT | Mod: 25

## 2024-06-15 PROCEDURE — 25000003 PHARM REV CODE 250: Performed by: PHYSICIAN ASSISTANT

## 2024-06-15 RX ORDER — NAPROXEN 250 MG/1
250 TABLET ORAL 2 TIMES DAILY PRN
Qty: 10 TABLET | Refills: 0 | Status: SHIPPED | OUTPATIENT
Start: 2024-06-15 | End: 2024-06-15 | Stop reason: ALTCHOICE

## 2024-06-15 RX ORDER — KETOROLAC TROMETHAMINE 10 MG/1
10 TABLET, FILM COATED ORAL
Status: COMPLETED | OUTPATIENT
Start: 2024-06-15 | End: 2024-06-15

## 2024-06-15 RX ORDER — HYDROCODONE BITARTRATE AND ACETAMINOPHEN 5; 325 MG/1; MG/1
1 TABLET ORAL
Status: DISCONTINUED | OUTPATIENT
Start: 2024-06-15 | End: 2024-06-15 | Stop reason: HOSPADM

## 2024-06-15 RX ORDER — ACETAMINOPHEN 500 MG
1000 TABLET ORAL
Status: COMPLETED | OUTPATIENT
Start: 2024-06-15 | End: 2024-06-15

## 2024-06-15 RX ORDER — ONDANSETRON HYDROCHLORIDE 2 MG/ML
4 INJECTION, SOLUTION INTRAVENOUS
Status: DISCONTINUED | OUTPATIENT
Start: 2024-06-15 | End: 2024-06-15

## 2024-06-15 RX ORDER — DICLOFENAC SODIUM 10 MG/G
2 GEL TOPICAL 2 TIMES DAILY PRN
Qty: 50 G | Refills: 0 | Status: SHIPPED | OUTPATIENT
Start: 2024-06-15 | End: 2024-06-15

## 2024-06-15 RX ORDER — DICLOFENAC SODIUM 10 MG/G
2 GEL TOPICAL 2 TIMES DAILY PRN
Qty: 50 G | Refills: 0 | Status: SHIPPED | OUTPATIENT
Start: 2024-06-15

## 2024-06-15 RX ORDER — NAPROXEN 500 MG/1
250 TABLET ORAL 2 TIMES DAILY PRN
Qty: 10 TABLET | Refills: 0 | OUTPATIENT
Start: 2024-06-15 | End: 2024-06-15

## 2024-06-15 RX ORDER — ONDANSETRON 8 MG/1
8 TABLET, ORALLY DISINTEGRATING ORAL
Status: COMPLETED | OUTPATIENT
Start: 2024-06-15 | End: 2024-06-15

## 2024-06-15 RX ADMIN — ACETAMINOPHEN 1000 MG: 500 TABLET ORAL at 07:06

## 2024-06-15 RX ADMIN — KETOROLAC TROMETHAMINE 10 MG: 10 TABLET, FILM COATED ORAL at 07:06

## 2024-06-15 RX ADMIN — ONDANSETRON 8 MG: 8 TABLET, ORALLY DISINTEGRATING ORAL at 09:06

## 2024-06-15 NOTE — ED PROVIDER NOTES
Encounter Date: 6/15/2024       History     Chief Complaint   Patient presents with    Fall     Lost her balance fell and hit her right leg, now having pain behind her thigh and her hip. Patient is able to ambulate on it with increased pain      78-year-old female with diabetes, hypertension, osteoarthritis presents for right thigh, buttock and low back pain after a fall that occurred prior to arrival.  She was walking up the stairs when she slipped, falling backwards onto her right leg.  Did not hit her head or lose consciousness.  No syncope, lightheadedness or weakness.  Now reporting pain primarily in her posterior right thigh.  She was able to ambulate, albeit with pain.  Denies neck pain, upper back pain or other complaint.  She takes baby aspirin, no other blood thinners.      Review of patient's allergies indicates:   Allergen Reactions    Anbesol [benzocaine] Swelling     Oral swelling     Amoxicillin Nausea And Vomiting     Past Medical History:   Diagnosis Date    Allergy     Angio-edema     Bronchitis     Diabetes mellitus     Hyperlipidemia     Hypertension     Pneumonia     Pneumonia     Syncope 10/3/2013     Past Surgical History:   Procedure Laterality Date    EYE SURGERY      HYSTERECTOMY      SINUS SURGERY       Family History   Problem Relation Name Age of Onset    Heart failure Mother      Hypertension Mother      Heart attack Brother       Social History     Tobacco Use    Smoking status: Never     Passive exposure: Never    Smokeless tobacco: Never   Substance Use Topics    Alcohol use: No    Drug use: No     Review of Systems    Physical Exam     Initial Vitals [06/15/24 0658]   BP Pulse Resp Temp SpO2   130/61 68 14 98.2 °F (36.8 °C) 97 %      MAP       --         Physical Exam    Nursing note and vitals reviewed.  Constitutional: She appears well-developed and well-nourished.   HENT:   Head: Normocephalic and atraumatic.   Neck: Neck supple.   Normal range of motion.  Cardiovascular:  Normal  rate, regular rhythm, normal heart sounds and intact distal pulses.     Exam reveals no gallop and no friction rub.       No murmur heard.  Pulmonary/Chest: Breath sounds normal. No respiratory distress. She has no wheezes. She has no rhonchi. She has no rales. She exhibits no tenderness.   Musculoskeletal:      Right wrist: Normal. No bony tenderness or snuff box tenderness.      Left wrist: Normal. No bony tenderness or snuff box tenderness.      Cervical back: Normal, normal range of motion and neck supple.      Thoracic back: Normal.      Lumbar back: Tenderness and bony tenderness present.      Right hip: Tenderness present. Decreased range of motion.      Left hip: Normal.      Right upper leg: Tenderness present. No bony tenderness.      Left upper leg: Normal.      Right knee: Normal.      Left knee: Normal.      Right lower leg: Normal.      Left lower leg: Normal.     Neurological: She is alert and oriented to person, place, and time.   Skin: Skin is warm and dry.   Psychiatric: She has a normal mood and affect.         ED Course   Procedures  Labs Reviewed - No data to display       Imaging Results              X-Ray Femur 2 AP/LAT Right (Final result)  Result time 06/15/24 10:49:37      Final result by Sindi Williamson MD (06/15/24 10:49:37)                   Impression:      No acute fracture identified.      Electronically signed by: Sindi Williamson MD  Date:    06/15/2024  Time:    10:49               Narrative:    EXAMINATION:  XR FEMUR 2 VIEW RIGHT    CLINICAL HISTORY:  Unspecified fall, initial encounter    TECHNIQUE:  AP and lateral views of the right femur were performed.    COMPARISON:  None    FINDINGS:  The right hip joint space appears normal.  The right femoral head contour is normal.  There is no degenerative change.  There is no fracture, no osseous lesions.    Mild knee joint space narrowing.  Mild sclerosis at the lateral tibial plateau consistent with degenerative change.   Trace of knee joint fluid.    The soft tissues appear normal.  There is mild vascular calcification of the femoral vessels.                                       X-Ray Pelvis Routine AP (Final result)  Result time 06/15/24 10:50:36   Procedure changed from X-Ray Hip 2 or 3 views Right with Pelvis when performed     Final result by Sindi Williamson MD (06/15/24 10:50:36)                   Impression:      No acute fracture identified.      Electronically signed by: Sindi Williamson MD  Date:    06/15/2024  Time:    10:50               Narrative:    EXAMINATION:  XR PELVIS ROUTINE AP    CLINICAL HISTORY:  Fall;  Unspecified fall, initial encounter    TECHNIQUE:  AP view of the pelvis was performed.    COMPARISON:  None.    FINDINGS:  The bilateral hip joint space appear normal.  There is no significant degenerative change of the bilateral hip joint.  There is mild sclerosis at the bilateral sacroiliac joints.  No acute fracture seen, no osseous lesion seen.  The remainder of the visualized osseous structures and soft tissues appear normal.                                       X-Ray Lumbar Spine Ap And Lateral (Final result)  Result time 06/15/24 10:44:56      Final result by Sindi Williamson MD (06/15/24 10:44:56)                   Impression:      Spondylosis of the lumbar spine, no definite acute process seen.    Moderate degenerative changes of the bilateral sacroiliac joints.      Electronically signed by: Sindi Williamson MD  Date:    06/15/2024  Time:    10:44               Narrative:    EXAMINATION:  XR LUMBAR SPINE AP AND LATERAL    CLINICAL HISTORY:  fall;    TECHNIQUE:  AP, lateral and spot images were performed of the lumbar spine.    COMPARISON:  09/21/2023    FINDINGS:  Subtle anterolisthesis of L3 relative to L4 and L4 relative to L5.    The vertebral body heights are well maintained.    Mild disc space narrowing at L4-5.    Small anterior osteophytes noted at L2 through L5.    Mild  sclerosis and osseous hypertrophy of the facet joints at L4-5 and L5-S1.    No fracture, no osseous lesions.    The sacroiliac joints appear symmetrical on the AP view.    The soft tissues appear normal.  Significant atherosclerotic plaque of the abdominal aorta.    Mild sclerosis at the bilateral sacroiliac joints consistent with degenerative change.                                       Medications   HYDROcodone-acetaminophen 5-325 mg per tablet 1 tablet (0 tablets Oral Hold 6/15/24 1040)   acetaminophen tablet 1,000 mg (1,000 mg Oral Given 6/15/24 0759)   ketorolac tablet 10 mg (10 mg Oral Given 6/15/24 0759)   ondansetron disintegrating tablet 8 mg (8 mg Oral Given 6/15/24 0927)     Medical Decision Making  78-year-old female presenting for right hip, thigh and low back pain after mechanical fall.  Her vitals are normal, she appears uncomfortable but nontoxic.    Differential diagnosis:  Muscle strain   Muscle spasm   Pelvic fracture   Lumbar fracture   Hip fracture felt less likely    Will give analgesics, do x-rays reassess.    Pain improved with therapy.  No fractures.  Patient is able to ambulate, albeit with discomfort.  Will discharge with analgesics and instructions to follow up with PCP return to the ED for worsening symptoms. Stressed the importance of follow-up, strict ED return precautions given.  Patient voiced understanding and is comfortable with discharge.    Amount and/or Complexity of Data Reviewed  Radiology: ordered and independent interpretation performed. Decision-making details documented in ED Course.    Risk  OTC drugs.  Prescription drug management.               ED Course as of 06/15/24 1135   Sat Cesario 15, 2024   0902 Patient reports feeling nauseous after medication, pain is improved but [CC]   1029 X-Ray Femur 2 AP/LAT Right  Per my independent interpretation, no acute fracture [CC]   1047 X-Ray Lumbar Spine Ap And Lateral  Degenerative change without acute fracture [CC]   1052 X-Ray  Femur 2 AP/LAT Right  No acute fracture [CC]   1057 Pain improved with therapy.  Patient is able to ambulate.  Will discharge. [CC]      ED Course User Index  [CC] Marylou Rodriguez PA-C                           Clinical Impression:  Final diagnoses:  [W19.XXXA] Fall  [M25.551] Hip pain, right (Primary)          ED Disposition Condition    Discharge Stable          ED Prescriptions       Medication Sig Dispense Start Date End Date Auth. Provider    naproxen (NAPROSYN) 500 MG tablet  (Status: Discontinued) Take 0.5 tablets (250 mg total) by mouth 2 (two) times daily as needed (Pain). 10 tablet 6/15/2024 6/15/2024 Marylou Rodriguez PA-C    naproxen (NAPROSYN) 500 MG tablet  (Status: Discontinued) Take 0.5 tablets (250 mg total) by mouth 2 (two) times daily as needed (Pain). 10 tablet 6/15/2024 6/15/2024 Marylou Rodriguez PA-C    naproxen (NAPROSYN) 250 MG tablet  (Status: Discontinued) Take 1 tablet (250 mg total) by mouth 2 (two) times daily as needed (Pain). 10 tablet 6/15/2024 6/15/2024 Marylou Rodriguez PA-C    diclofenac sodium (VOLTAREN ARTHRITIS PAIN) 1 % Gel  (Status: Discontinued) Apply 2 g topically 2 (two) times daily as needed (pain). 50 g 6/15/2024 6/15/2024 Marylou Rodriguez PA-C    diclofenac sodium (VOLTAREN ARTHRITIS PAIN) 1 % Gel Apply 2 g topically 2 (two) times daily as needed (pain). 50 g 6/15/2024 -- Marylou Rodriguez PA-C          Follow-up Information       Follow up With Specialties Details Why Contact Info    Paige Hawkins MD Cardiology Schedule an appointment as soon as possible for a visit in 1 week  [address]  [facility] [id number] [phone]      [facility] - Emergency Dept Emergency Medicine Go to  If symptoms worsen [address]  [facility] [address] [phone]             Marylou Rodriguez PA-C  06/15/24 1135

## 2024-06-15 NOTE — ED NOTES
States fall going up steps, lost her balance and fell backwards. Pain to posterior R thigh and R hip. Ambulatory with increased pain. No head injury    Patient identifiers for Lesly Stanforder 78 y.o. female checked and correct.  Chief Complaint   Patient presents with    Fall     Lost her balance fell and hit her right leg, now having pain behind her thigh and her hip. Patient is able to ambulate on it with increased pain      Past Medical History:   Diagnosis Date    Allergy     Angio-edema     Bronchitis     Diabetes mellitus     Hyperlipidemia     Hypertension     Pneumonia     Pneumonia      Allergies reported:   Review of patient's allergies indicates:   Allergen Reactions    Anbesol [benzocaine] Swelling     Oral swelling     Amoxicillin Nausea And Vomiting       Appearance: Pt awake, alert & oriented to person, place & time. Pt in no acute distress at present time. Pt is clean and well groomed with clothes appropriately fastened.   Skin: Skin warm, dry & intact. Color consistent with ethnicity. Mucous membranes moist. No breakdown or brusing noted.   Musculoskeletal: Patient moving all extremities well, no obvious swelling or deformities noted.   Respiratory: Respirations spontaneous, even, and non-labored. Visible chest rise noted. Airway is open and patent. No accessory muscle use noted.   Neurologic: Sensation is intact. Speech is clear and appropriate. Eyes open spontaneously, behavior appropriate to situation, follows commands, facial expression symmetrical, bilateral hand grasp equal and even, purposeful motor response noted.  Cardiac: All peripheral pulses present. No Bilateral lower extremity edema. Cap refill is <3 seconds.  Abdomen: Abdomen soft, non distended, non tender to palpation.   : Pt voids independently, denies dysuria, hematuria, frequency.

## 2024-06-15 NOTE — DISCHARGE INSTRUCTIONS
Diagnosis: Fall, hip pain    Your x-rays did not show any broken bones.  They did show arthritis and some degenerative change.  I am prescribing an anti-inflammatory medicine you can take as needed for pain.  Take this with food as it can aggravate your stomach. You should take Tylenol in addition to this as needed for pain up to 3 grams daily which is 6 of the 500 mg extra strength tablets.    Please schedule an appointment with your primary care doctor for follow-up. If you start to have any new or worsening symptoms, please come back to the emergency department.

## 2024-09-30 ENCOUNTER — OFFICE VISIT (OUTPATIENT)
Dept: CARDIOLOGY | Facility: CLINIC | Age: 79
End: 2024-09-30
Payer: MEDICARE

## 2024-09-30 VITALS — HEART RATE: 68 BPM | SYSTOLIC BLOOD PRESSURE: 152 MMHG | DIASTOLIC BLOOD PRESSURE: 66 MMHG | OXYGEN SATURATION: 100 %

## 2024-09-30 DIAGNOSIS — E87.6 HYPOKALEMIA: ICD-10-CM

## 2024-09-30 DIAGNOSIS — R00.2 PALPITATIONS: ICD-10-CM

## 2024-09-30 DIAGNOSIS — E11.22 CONTROLLED TYPE 2 DIABETES MELLITUS WITH STAGE 2 CHRONIC KIDNEY DISEASE, UNSPECIFIED WHETHER LONG TERM INSULIN USE: ICD-10-CM

## 2024-09-30 DIAGNOSIS — I10 PRIMARY HYPERTENSION: ICD-10-CM

## 2024-09-30 DIAGNOSIS — R42 EPISODIC LIGHTHEADEDNESS: Primary | ICD-10-CM

## 2024-09-30 DIAGNOSIS — N18.2 CONTROLLED TYPE 2 DIABETES MELLITUS WITH STAGE 2 CHRONIC KIDNEY DISEASE, UNSPECIFIED WHETHER LONG TERM INSULIN USE: ICD-10-CM

## 2024-09-30 LAB
OHS QRS DURATION: 92 MS
OHS QTC CALCULATION: 418 MS

## 2024-09-30 PROCEDURE — 99999 PR PBB SHADOW E&M-EST. PATIENT-LVL III: CPT | Mod: PBBFAC,GC,, | Performed by: STUDENT IN AN ORGANIZED HEALTH CARE EDUCATION/TRAINING PROGRAM

## 2024-09-30 PROCEDURE — 93000 ELECTROCARDIOGRAM COMPLETE: CPT | Mod: S$GLB,,, | Performed by: INTERNAL MEDICINE

## 2024-09-30 NOTE — PROGRESS NOTES
PCP - Paige Hawkins MD    Subjective:   Patient ID:  Lesly Jacob is a 78 y.o. female who presents to Establish Care      Referring physician: Dr. Paige Cortes C*     HPI  Mrs. Jacob has a PMH DM2, HTN, HLD, PACs, previous smoker (quit 30 yrs ago) who has been referred to see us for palpitations/dizziness evaluation. Patient admits intermittent episodes of mostly lightheadedness that occur after she has been standing for a prolonged time. Episodes last a few minutes and usually resolves after she lays down.  Admits that she has not been physically active for a very long time. She enjoys taking care of her great grandson and does not feel limited by these episodes. Patient denies any associated symptoms such as chest pain, mei, sob, pnd, orthopnea, palpitations, syncope, vertigo, dizziness, claudication or any other symptoms. She usually will get edema after the end of the day around her ankles. Patient had a holter monitor in 2017 which showed NSR with isolated PACs and PVCs. Her las echo was done in 2017 which showed a normal LVEF, mild MR and mild TR. A nuclear exercise stress test was also done during that time which was negative for ischemia.      Past Medical History:   Diagnosis Date    Allergy     Angio-edema     Bronchitis     Diabetes mellitus     Hyperlipidemia     Hypertension     Pneumonia     Pneumonia     Syncope 10/3/2013       Past Surgical History:   Procedure Laterality Date    EYE SURGERY      HYSTERECTOMY      SINUS SURGERY         Current Outpatient Medications on File Prior to Visit   Medication Sig Dispense Refill    acetaminophen (TYLENOL) 650 MG TbSR Take 1 tablet (650 mg total) by mouth every 8 (eight) hours as needed (mouth pain). 20 tablet 0    amLODIPine (NORVASC) 10 MG tablet take 1 tablet by Oral route 1 time per day      aspirin (ECOTRIN) 81 MG EC tablet Take 81 mg by mouth once daily.      atorvastatin (LIPITOR) 20 MG tablet take 1 tablet (20 mg)  by oral route once daily      calcium carbonate-vitamin D3 500 mg-3.125 mcg (125 unit) per tablet Take 1 tablet by mouth once daily.        chlorhexidine (PERIDEX) 0.12 % solution SMARTSIG:By Mouth      clotrimazole (LOTRIMIN) 1 % cream Apply to affected area 2 times daily 60 g 2    diclofenac sodium (VOLTAREN ARTHRITIS PAIN) 1 % Gel Apply 2 g topically 2 (two) times daily as needed (pain). 50 g 0    diphenhydrAMINE-aluminum-magnesium hydroxide-simethicone-LIDOcaine viscous HCl 2% Swish and spit 15 mLs every 4 (four) hours as needed (mouth pain). 200 each 0    FLUoxetine (PROZAC) 20 MG capsule take 1 capsule (20 mg) by oral route once daily      hydrOXYzine HCl (ATARAX) 25 MG tablet Take 25 mg by mouth 3 (three) times daily as needed for Itching.      ketorolac 0.5% (ACULAR) 0.5 % Drop       LIDOcaine (LIDODERM) 5 % Place 1 patch onto the skin once daily. Remove & Discard patch within 12 hours or as directed by MD 30 patch 0    losartan-hydrochlorothiazide 100-25 mg (HYZAAR) 100-25 mg per tablet Take 1 tablet by mouth once daily.       metFORMIN (GLUCOPHAGE) 500 MG tablet take 1 tablet by Oral route 2 times per day with morning and evening meals      neomycin-bacitracin-polymyxin (NEOSPORIN) ointment Apply topically 3 (three) times daily.      ofloxacin (OCUFLOX) 0.3 % ophthalmic solution       ondansetron (ZOFRAN-ODT) 4 MG TbDL Take 1 tablet (4 mg total) by mouth every 6 (six) hours as needed (n/v). 30 tablet 0    prednisoLONE acetate (PRED FORTE) 1 % DrpS       triamcinolone acetonide 0.1% (KENALOG) 0.1 % paste Place onto teeth.      aluminum-magnesium hydroxide-simethicone (MAALOX) 200-200-20 mg/5 mL Susp Take 30 mLs by mouth 4 (four) times daily before meals and nightly. 354 mL 0    metoprolol tartrate (LOPRESSOR) 25 MG tablet Take 1 tablet (25 mg total) by mouth 2 (two) times daily. 60 tablet 0     No current facility-administered medications on file prior to visit.       Review of patient's allergies  indicates:   Allergen Reactions    Anbesol [benzocaine] Swelling     Oral swelling     Amoxicillin Nausea And Vomiting       Social History     Tobacco Use    Smoking status: Never     Passive exposure: Never    Smokeless tobacco: Never   Substance Use Topics    Alcohol use: No    Drug use: No       Family History   Problem Relation Name Age of Onset    Heart failure Mother      Hypertension Mother      Heart attack Brother           Review of Systems   Cardiovascular:  Negative for irregular heartbeat, near-syncope and orthopnea.   Respiratory:  Negative for cough, sleep disturbances due to breathing, snoring and sputum production.    Neurological:  Negative for focal weakness, headaches and weakness.   Psychiatric/Behavioral:  Negative for altered mental status. The patient is not nervous/anxious.    All other systems reviewed and are negative.       Objective:     Vitals:    09/30/24 0854   BP: (!) 152/66   Patient Position: Sitting   Pulse: 68   SpO2: 100%        Physical Exam  Vitals and nursing note reviewed.   Constitutional:       General: She is not in acute distress.     Appearance: Normal appearance. She is not ill-appearing.   HENT:      Head: Normocephalic and atraumatic.      Nose: Nose normal.      Mouth/Throat:      Mouth: Mucous membranes are moist.      Pharynx: Oropharynx is clear.   Eyes:      Extraocular Movements: Extraocular movements intact.      Conjunctiva/sclera: Conjunctivae normal.   Neck:      Vascular: No carotid bruit.   Cardiovascular:      Rate and Rhythm: Normal rate and regular rhythm.      Pulses:           Carotid pulses are 2+ on the right side and 2+ on the left side.       Radial pulses are 2+ on the right side and 2+ on the left side.        Dorsalis pedis pulses are 2+ on the right side and 2+ on the left side.        Posterior tibial pulses are 2+ on the right side and 2+ on the left side.      Heart sounds: No murmur heard.     No friction rub. No gallop.   Pulmonary:      " Effort: Pulmonary effort is normal. No respiratory distress.      Breath sounds: Normal breath sounds. No wheezing or rhonchi.   Abdominal:      General: Abdomen is flat. There is no distension.      Palpations: Abdomen is soft.   Musculoskeletal:         General: No swelling or tenderness.      Cervical back: Normal range of motion and neck supple.   Skin:     General: Skin is warm and dry.      Capillary Refill: Capillary refill takes less than 2 seconds.   Neurological:      General: No focal deficit present.      Mental Status: She is alert and oriented to person, place, and time.      Motor: No weakness.      Gait: Gait normal.   Psychiatric:         Mood and Affect: Mood normal.         Thought Content: Thought content normal.           Labs:     Lab Results   Component Value Date     04/18/2024    K 3.3 (L) 04/18/2024     04/18/2024    CO2 23 04/18/2024    BUN 35 (H) 04/18/2024    CREATININE 1.5 (H) 04/18/2024    ANIONGAP 11 04/18/2024     Lab Results   Component Value Date    HGBA1C 6.0 (H) 03/01/2024     No results found for: "BNP", "BNPTRIAGEBLO"    Lab Results   Component Value Date    WBC 9.33 04/18/2024    HGB 12.3 04/18/2024    HCT 37.4 04/18/2024    HCT 38 07/27/2022     04/18/2024    GRAN 5.0 04/18/2024    GRAN 53.5 04/18/2024     Lab Results   Component Value Date    CHOL 141 10/01/2024    HDL 36 (L) 10/01/2024    LDLCALC 84.4 10/01/2024    TRIG 103 10/01/2024       Lab Results   Component Value Date     04/18/2024    K 3.3 (L) 04/18/2024     04/18/2024    CO2 23 04/18/2024    BUN 35 (H) 04/18/2024    CREATININE 1.5 (H) 04/18/2024    ANIONGAP 11 04/18/2024     Lab Results   Component Value Date    HGBA1C 6.0 (H) 03/01/2024     No results found for: "BNP", "BNPTRIAGEBLO" Lab Results   Component Value Date    WBC 9.33 04/18/2024    HGB 12.3 04/18/2024    HCT 37.4 04/18/2024    HCT 38 07/27/2022     04/18/2024    GRAN 5.0 04/18/2024    GRAN 53.5 04/18/2024     Lab " "Results   Component Value Date    CHOL 141 10/01/2024    HDL 36 (L) 10/01/2024    LDLCALC 84.4 10/01/2024    TRIG 103 10/01/2024                Cardiovascular Imaging:     Echo: No results found for: "EF"    Stress test:     [facility]:    Assessment:       1. Episodic lightheadedness    2. Palpitations    3. Hypokalemia    4. Controlled type 2 diabetes mellitus with stage 2 chronic kidney disease, unspecified whether long term insulin use    5. Primary hypertension         Plan:       Mrs. Jacob was seen today for establish care.Diagnoses and all orders for this visit:    Episodic lightheadedness  - episodes likely related to chronic deconditioning. We will obtain an echo and a 48 holter monitor.   -     Echo; Future      Palpitations  - per past records had some palpitations. Past holter monitor noted to have some PACs and PVCs.   -     IN OFFICE EKG 12-LEAD (to Muse)  -     Echo; Future  -     Holter monitor - 48 hour; Future  -     TSH; Future  -     T4, Free; Future    Hypokalemia  - patient on HCTZ 25mg; we will repeat a CMP and reevaluate.   -     Comprehensive Metabolic Panel; Future  -     Magnesium; Future    Controlled type 2 diabetes mellitus with stage 2 chronic kidney disease, unspecified whether long term insulin use  - A1c: 6; will obtain a lipid panel. Ok to continue current dose of atorvastatin 20mg.   - obtaining repeat chemistry as well.   -     LIPID PANEL; Future    Primary hypertension  - admits not monitoring blood pressure at home and having an old broken monitor. Encourage to monitor BP at least twice a day for a week and to keep a log of her numbers.   - unclear if on metoprolol tartrate. Continue losartan-hctz 100mg-25mg and amlodipine 10mg for now.          Discussed with staff. We would like to see patient back in 6 months.     Signed:  Emely Carmona M.D.  Cardiovascular Fellow  [facility]      "

## 2024-10-01 ENCOUNTER — LAB VISIT (OUTPATIENT)
Dept: LAB | Facility: HOSPITAL | Age: 79
End: 2024-10-01
Payer: MEDICARE

## 2024-10-01 DIAGNOSIS — N18.2 CONTROLLED TYPE 2 DIABETES MELLITUS WITH STAGE 2 CHRONIC KIDNEY DISEASE, UNSPECIFIED WHETHER LONG TERM INSULIN USE: ICD-10-CM

## 2024-10-01 DIAGNOSIS — E11.22 CONTROLLED TYPE 2 DIABETES MELLITUS WITH STAGE 2 CHRONIC KIDNEY DISEASE, UNSPECIFIED WHETHER LONG TERM INSULIN USE: ICD-10-CM

## 2024-10-01 LAB
CHOLEST SERPL-MCNC: 141 MG/DL (ref 120–199)
CHOLEST/HDLC SERPL: 3.9 {RATIO} (ref 2–5)
HDLC SERPL-MCNC: 36 MG/DL (ref 40–75)
HDLC SERPL: 25.5 % (ref 20–50)
LDLC SERPL CALC-MCNC: 84.4 MG/DL (ref 63–159)
NONHDLC SERPL-MCNC: 105 MG/DL
TRIGL SERPL-MCNC: 103 MG/DL (ref 30–150)

## 2024-10-01 PROCEDURE — 36415 COLL VENOUS BLD VENIPUNCTURE: CPT | Performed by: STUDENT IN AN ORGANIZED HEALTH CARE EDUCATION/TRAINING PROGRAM

## 2024-10-01 PROCEDURE — 80061 LIPID PANEL: CPT | Performed by: STUDENT IN AN ORGANIZED HEALTH CARE EDUCATION/TRAINING PROGRAM

## 2024-10-23 ENCOUNTER — HOSPITAL ENCOUNTER (OUTPATIENT)
Dept: CARDIOLOGY | Facility: HOSPITAL | Age: 79
Discharge: HOME | End: 2024-10-23
Attending: STUDENT IN AN ORGANIZED HEALTH CARE EDUCATION/TRAINING PROGRAM
Payer: MEDICARE

## 2024-10-23 ENCOUNTER — CLINICAL SUPPORT (OUTPATIENT)
Dept: CARDIOLOGY | Facility: HOSPITAL | Age: 79
End: 2024-10-23
Attending: STUDENT IN AN ORGANIZED HEALTH CARE EDUCATION/TRAINING PROGRAM
Payer: MEDICARE

## 2024-10-23 VITALS
SYSTOLIC BLOOD PRESSURE: 152 MMHG | BODY MASS INDEX: 30.73 KG/M2 | HEART RATE: 75 BPM | WEIGHT: 180 LBS | HEIGHT: 64 IN | DIASTOLIC BLOOD PRESSURE: 66 MMHG

## 2024-10-23 DIAGNOSIS — R00.2 PALPITATIONS: ICD-10-CM

## 2024-10-23 DIAGNOSIS — R42 EPISODIC LIGHTHEADEDNESS: ICD-10-CM

## 2024-10-23 LAB
ASCENDING AORTA: 3.22 CM
AV AREA BY CONTINUOUS VTI: 2.7 CM2
AV INDEX (PROSTH): 0.81
AV LVOT MEAN GRADIENT: 4 MMHG
AV LVOT PEAK GRADIENT: 7 MMHG
AV MEAN GRADIENT: 6.7 MMHG
AV PEAK GRADIENT: 14.4 MMHG
AV VALVE AREA BY VELOCITY RATIO: 2.1 CM²
AV VALVE AREA: 2.6 CM2
AV VELOCITY RATIO: 0.68
BSA FOR ECHO PROCEDURE: 1.92 M2
CV ECHO LV RWT: 0.47 CM
DOP CALC AO PEAK VEL: 1.9 M/S
DOP CALC AO VTI: 42.1 CM
DOP CALC LVOT AREA: 3.1 CM2
DOP CALC LVOT DIAMETER: 2 CM
DOP CALC LVOT PEAK VEL: 1.3 M/S
DOP CALC LVOT STROKE VOLUME: 107.7 CM3
DOP CALCLVOT PEAK VEL VTI: 34.3 CM
E WAVE DECELERATION TIME: 168.45 MS
E/A RATIO: 0.84
E/E' RATIO: 7.33 M/S
ECHO EF ESTIMATED: 70 %
ECHO LV POSTERIOR WALL: 1 CM (ref 0.6–1.1)
FRACTIONAL SHORTENING: 39.5 % (ref 28–44)
INTERVENTRICULAR SEPTUM: 1.1 CM (ref 0.6–1.1)
IVC DIAMETER: 1.9 CM
IVRT: 71.36 MS
LA MAJOR: 5.8 CM
LA MINOR: 5.12 CM
LA WIDTH: 4.36 CM
LEFT ATRIUM SIZE: 4.01 CM
LEFT ATRIUM VOLUME INDEX MOD: 45.8 ML/M2
LEFT ATRIUM VOLUME INDEX: 43.2 ML/M2
LEFT ATRIUM VOLUME MOD: 85.58 ML
LEFT ATRIUM VOLUME: 80.83 CM3
LEFT INTERNAL DIMENSION IN SYSTOLE: 2.6 CM (ref 2.1–4)
LEFT VENTRICLE DIASTOLIC VOLUME INDEX: 44.42 ML/M2
LEFT VENTRICLE DIASTOLIC VOLUME: 83.06 ML
LEFT VENTRICLE MASS INDEX: 81.6 G/M2
LEFT VENTRICLE SYSTOLIC VOLUME INDEX: 13.3 ML/M2
LEFT VENTRICLE SYSTOLIC VOLUME: 24.85 ML
LEFT VENTRICULAR INTERNAL DIMENSION IN DIASTOLE: 4.3 CM (ref 3.5–6)
LEFT VENTRICULAR MASS: 152.6 G
LV LATERAL E/E' RATIO: 6.6
LV SEPTAL E/E' RATIO: 8.25
MV A" WAVE DURATION": 176.97 MS
MV PEAK A VEL: 0.79 M/S
MV PEAK E VEL: 0.66 M/S
OHS CV RV/LV RATIO: 0.88 CM
PISA TR MAX VEL: 3.02 M/S
PULM VEIN A" WAVE DURATION": 176.97 MS
PULM VEIN S/D RATIO: 2.28
PULMONIC VEIN PEAK A VELOCITY: 0.3 M/S
PV PEAK D VEL: 0.32 M/S
PV PEAK S VEL: 0.73 M/S
RA MAJOR: 5.29 CM
RA PRESSURE ESTIMATED: 3 MMHG
RA WIDTH: 4.05 CM
RIGHT ATRIAL AREA: 21.2 CM2
RIGHT VENTRICLE DIASTOLIC BASEL DIMENSION: 3.8 CM
RV TB RVSP: 6 MMHG
RV TISSUE DOPPLER FREE WALL SYSTOLIC VELOCITY 1 (APICAL 4 CHAMBER VIEW): 13.85 CM/S
SINUS: 2.99 CM
STJ: 2.32 CM
TDI LATERAL: 0.1 M/S
TDI SEPTAL: 0.08 M/S
TDI: 0.09 M/S
TR MAX PG: 36 MMHG
TRICUSPID ANNULAR PLANE SYSTOLIC EXCURSION: 2.46 CM
TV PEAK GRADIENT: 37 MMHG
TV REST PULMONARY ARTERY PRESSURE: 39 MMHG
Z-SCORE OF LEFT VENTRICULAR DIMENSION IN END DIASTOLE: -1.92
Z-SCORE OF LEFT VENTRICULAR DIMENSION IN END SYSTOLE: -1.68

## 2024-10-23 PROCEDURE — C8929 TTE W OR WO FOL WCON,DOPPLER: HCPCS

## 2024-10-23 PROCEDURE — 93225 XTRNL ECG REC<48 HRS REC: CPT

## 2024-10-23 PROCEDURE — 93306 TTE W/DOPPLER COMPLETE: CPT | Mod: 26,,, | Performed by: INTERNAL MEDICINE

## 2024-10-23 PROCEDURE — 25500020 PHARM REV CODE 255: Performed by: STUDENT IN AN ORGANIZED HEALTH CARE EDUCATION/TRAINING PROGRAM

## 2024-10-23 PROCEDURE — 93226 XTRNL ECG REC<48 HR SCAN A/R: CPT

## 2024-10-23 RX ADMIN — HUMAN ALBUMIN MICROSPHERES AND PERFLUTREN 0.66 MG: 10; .22 INJECTION, SOLUTION INTRAVENOUS at 02:10

## 2024-10-23 NOTE — NURSING NOTE
Patient identified by 2 identifiers. Allergies reviewed, procedure explained and pt verbalized understanding.  22 g IV placed to Lt AC, flushed w/ 10cc NS pre & post contrast administration.  3cc Optison administered by sonographer, echo images obtained.  Pt tolerated procedure well.  IV D/C'ed, preasure dsg applied.  Pt D/C'ed to home.

## 2025-05-01 ENCOUNTER — HOSPITAL ENCOUNTER (OUTPATIENT)
Dept: RADIOLOGY | Facility: HOSPITAL | Age: 80
Discharge: HOME | End: 2025-05-01
Attending: INTERNAL MEDICINE
Payer: MEDICARE

## 2025-05-01 ENCOUNTER — RESULTS FOLLOW-UP (OUTPATIENT)
Dept: INTERNAL MEDICINE | Facility: CLINIC | Age: 80
End: 2025-05-01

## 2025-05-01 ENCOUNTER — OFFICE VISIT (OUTPATIENT)
Dept: INTERNAL MEDICINE | Facility: CLINIC | Age: 80
End: 2025-05-01
Payer: MEDICARE

## 2025-05-01 ENCOUNTER — TELEPHONE (OUTPATIENT)
Dept: INTERNAL MEDICINE | Facility: CLINIC | Age: 80
End: 2025-05-01

## 2025-05-01 VITALS
DIASTOLIC BLOOD PRESSURE: 66 MMHG | SYSTOLIC BLOOD PRESSURE: 148 MMHG | HEART RATE: 72 BPM | OXYGEN SATURATION: 98 % | BODY MASS INDEX: 31.09 KG/M2 | HEIGHT: 64 IN | WEIGHT: 182.13 LBS

## 2025-05-01 DIAGNOSIS — M43.17 SPONDYLOLISTHESIS OF LUMBOSACRAL REGION: ICD-10-CM

## 2025-05-01 DIAGNOSIS — E11.9 TYPE 2 DIABETES MELLITUS WITHOUT COMPLICATION, WITHOUT LONG-TERM CURRENT USE OF INSULIN: ICD-10-CM

## 2025-05-01 DIAGNOSIS — M15.0 PRIMARY OSTEOARTHRITIS INVOLVING MULTIPLE JOINTS: ICD-10-CM

## 2025-05-01 DIAGNOSIS — I65.23 CAROTID ATHEROSCLEROSIS, BILATERAL: ICD-10-CM

## 2025-05-01 DIAGNOSIS — Z76.89 ENCOUNTER TO ESTABLISH CARE: Primary | ICD-10-CM

## 2025-05-01 DIAGNOSIS — I49.1 PAC (PREMATURE ATRIAL CONTRACTION): ICD-10-CM

## 2025-05-01 DIAGNOSIS — M54.50 CHRONIC BILATERAL LOW BACK PAIN WITHOUT SCIATICA: ICD-10-CM

## 2025-05-01 DIAGNOSIS — G89.29 CHRONIC BILATERAL LOW BACK PAIN WITHOUT SCIATICA: ICD-10-CM

## 2025-05-01 DIAGNOSIS — F41.9 ANXIETY: ICD-10-CM

## 2025-05-01 DIAGNOSIS — E78.2 MIXED HYPERLIPIDEMIA: ICD-10-CM

## 2025-05-01 DIAGNOSIS — I10 PRIMARY HYPERTENSION: ICD-10-CM

## 2025-05-01 DIAGNOSIS — Z78.0 MENOPAUSE: ICD-10-CM

## 2025-05-01 PROBLEM — E11.618: Status: RESOLVED | Noted: 2022-08-04 | Resolved: 2025-05-01

## 2025-05-01 PROCEDURE — 93880 EXTRACRANIAL BILAT STUDY: CPT | Mod: 26,,, | Performed by: RADIOLOGY

## 2025-05-01 PROCEDURE — 72110 X-RAY EXAM L-2 SPINE 4/>VWS: CPT | Mod: 26,,, | Performed by: RADIOLOGY

## 2025-05-01 PROCEDURE — 93880 EXTRACRANIAL BILAT STUDY: CPT | Mod: TC

## 2025-05-01 PROCEDURE — 99999 PR PBB SHADOW E&M-EST. PATIENT-LVL V: CPT | Mod: PBBFAC,,, | Performed by: INTERNAL MEDICINE

## 2025-05-01 PROCEDURE — 72110 X-RAY EXAM L-2 SPINE 4/>VWS: CPT | Mod: TC

## 2025-05-01 RX ORDER — ATORVASTATIN CALCIUM 20 MG/1
20 TABLET, FILM COATED ORAL DAILY
Qty: 90 TABLET | Refills: 3 | Status: SHIPPED | OUTPATIENT
Start: 2025-05-01

## 2025-05-01 RX ORDER — AMLODIPINE BESYLATE 10 MG/1
10 TABLET ORAL DAILY
Qty: 90 TABLET | Refills: 3 | Status: SHIPPED | OUTPATIENT
Start: 2025-05-01

## 2025-05-01 RX ORDER — LOSARTAN POTASSIUM AND HYDROCHLOROTHIAZIDE 25; 100 MG/1; MG/1
1 TABLET ORAL DAILY
Qty: 90 TABLET | Refills: 3 | Status: SHIPPED | OUTPATIENT
Start: 2025-05-01

## 2025-05-01 RX ORDER — FLUOXETINE HYDROCHLORIDE 20 MG/1
20 CAPSULE ORAL DAILY
Qty: 90 CAPSULE | Refills: 3 | Status: SHIPPED | OUTPATIENT
Start: 2025-05-01

## 2025-05-01 RX ORDER — METFORMIN HYDROCHLORIDE 500 MG/1
500 TABLET, EXTENDED RELEASE ORAL 2 TIMES DAILY WITH MEALS
Qty: 180 TABLET | Refills: 3 | Status: SHIPPED | OUTPATIENT
Start: 2025-05-01 | End: 2026-05-01

## 2025-05-01 NOTE — PATIENT INSTRUCTIONS
Labwork today  Urine today.  X-ray of back today.    Schedule optometry appointment  Schedule back and spine appointment.     Purchase a new blood pressure cuff.   Check your blood pressures at home, twice a day, for 1 week, just to make sure that it is within reasonable range. Goal blood pressure <140/<80 (130s/70s).  Let the office know if your blood pressure is not near goal. Send either a patient message or call the office [phone] to schedule a nurse visit for blood pressure check / BP device check.     Refills of your prescriptions sent to [facility] on [address].     Return to clinic in 3 months or sooner if needed.

## 2025-05-01 NOTE — PROGRESS NOTES
Subjective:       Patient ID: Lesly Jacob is a 79 y.o. female.    Chief Complaint: Establish Care      HPI  Lesly Jacob is a 79 y.o. year old female with T2DM, HTN, HLD, PACs, angioedema, chronic LBP presents for establishment of care. Previously followed by [facility] for years. Reports chronic low back with activity around the home. Takes medications daily, states metformin causes GI issues so she will skip this medication frequently. Unsure if she has tried metformin ER.     Review of Systems   Constitutional:  Negative for activity change, appetite change, fatigue, fever and unexpected weight change.   HENT:  Negative for congestion, hearing loss, postnasal drip, sneezing, sore throat, trouble swallowing and voice change.    Eyes:  Negative for pain and discharge.   Respiratory:  Negative for cough, choking, chest tightness, shortness of breath and wheezing.    Cardiovascular:  Negative for chest pain, palpitations and leg swelling.   Gastrointestinal:  Negative for abdominal distention, abdominal pain, blood in stool, constipation, diarrhea, nausea and vomiting.   Endocrine: Negative for polydipsia and polyuria.   Genitourinary:  Negative for difficulty urinating and flank pain.   Musculoskeletal:  Negative for arthralgias, back pain, joint swelling, myalgias and neck pain.   Skin:  Negative for rash.   Neurological:  Negative for dizziness, tremors, seizures, weakness, numbness and headaches.   Psychiatric/Behavioral:  Negative for agitation. The patient is not nervous/anxious.          Past Medical History:   Diagnosis Date    Allergy     Angio-edema     Bronchitis     Diabetes mellitus     Hyperlipidemia     Hypertension     Pneumonia     Pneumonia     Syncope 10/3/2013        Prior to Admission medications    Medication Sig Start Date End Date Taking? Authorizing Provider   acetaminophen (TYLENOL) 650 MG TbSR Take 1 tablet (650 mg total) by mouth every 8 (eight)  hours as needed (mouth pain). 5/11/24   Kaia Sheppard PA-C   aluminum-magnesium hydroxide-simethicone (MAALOX) 200-200-20 mg/5 mL Susp Take 30 mLs by mouth 4 (four) times daily before meals and nightly. 7/27/22 10/10/23  Nneka Wilkinson MD   amLODIPine (NORVASC) 10 MG tablet take 1 tablet by Oral route 1 time per day 12/4/17   Provider, Historical   aspirin (ECOTRIN) 81 MG EC tablet Take 81 mg by mouth once daily.    Provider, Historical   atorvastatin (LIPITOR) 20 MG tablet take 1 tablet (20 mg) by oral route once daily 12/4/17   Provider, Historical   calcium carbonate-vitamin D3 500 mg-3.125 mcg (125 unit) per tablet Take 1 tablet by mouth once daily.      Provider, Historical   chlorhexidine (PERIDEX) 0.12 % solution SMARTSIG:By Mouth 2/28/24   Provider, Historical   clotrimazole (LOTRIMIN) 1 % cream Apply to affected area 2 times daily 8/17/17   Carlton Erazo PA-C   diclofenac sodium (VOLTAREN ARTHRITIS PAIN) 1 % Gel Apply 2 g topically 2 (two) times daily as needed (pain). 6/15/24   Marylou Rodriguez PA-C   diphenhydrAMINE-aluminum-magnesium hydroxide-simethicone-LIDOcaine viscous HCl 2% Swish and spit 15 mLs every 4 (four) hours as needed (mouth pain). 5/11/24   Kaia Sheppard PA-C   FLUoxetine (PROZAC) 20 MG capsule take 1 capsule (20 mg) by oral route once daily 12/4/17   Provider, Historical   hydrOXYzine HCl (ATARAX) 25 MG tablet Take 25 mg by mouth 3 (three) times daily as needed for Itching.    Provider, Historical   ketorolac 0.5% (ACULAR) 0.5 % Drop  3/9/18   Provider, Historical   LIDOcaine (LIDODERM) 5 % Place 1 patch onto the skin once daily. Remove & Discard patch within 12 hours or as directed by MD 9/21/23   Phil Bazan,    losartan-hydrochlorothiazide 100-25 mg (HYZAAR) 100-25 mg per tablet Take 1 tablet by mouth once daily.  9/27/17   Provider, Historical   metFORMIN (GLUCOPHAGE) 500 MG tablet take 1 tablet by Oral route 2 times per day with morning and evening  "meals 12/4/17   Provider, Historical   metoprolol tartrate (LOPRESSOR) 25 MG tablet Take 1 tablet (25 mg total) by mouth 2 (two) times daily. 4/20/23 5/11/24  Dallas Lilly MD   neomycin-bacitracin-polymyxin (NEOSPORIN) ointment Apply topically 3 (three) times daily.    Provider, Historical   ofloxacin (OCUFLOX) 0.3 % ophthalmic solution  3/9/18   Provider, Historical   ondansetron (ZOFRAN-ODT) 4 MG TbDL Take 1 tablet (4 mg total) by mouth every 6 (six) hours as needed (n/v). 4/19/24   Viviane Rosas MD   prednisoLONE acetate (PRED FORTE) 1 % DrpS  3/9/18   Provider, Historical   triamcinolone acetonide 0.1% (KENALOG) 0.1 % paste Place onto teeth. 2/28/24   Provider, Historical        Past medical history, surgical history, and family medical history reviewed and updated as appropriate.    Medications and allergies reviewed.     Objective:          Vitals:    05/01/25 0800 05/01/25 0834   BP: (!) 164/70 (!) 148/66   BP Location: Right arm    Patient Position: Sitting    Pulse: 72    SpO2: 98%    Weight: 82.6 kg (182 lb 1.6 oz)    Height: 5' 4" (1.626 m)      Body mass index is 31.26 kg/m².  Physical Exam  Constitutional:       Appearance: She is well-developed.   HENT:      Head: Normocephalic and atraumatic.   Eyes:      Extraocular Movements: Extraocular movements intact.   Cardiovascular:      Rate and Rhythm: Normal rate and regular rhythm.      Heart sounds: Normal heart sounds.   Pulmonary:      Effort: Pulmonary effort is normal. No respiratory distress.      Breath sounds: Normal breath sounds. No wheezing.   Abdominal:      General: Bowel sounds are normal. There is no distension.      Palpations: Abdomen is soft.      Tenderness: There is no abdominal tenderness.   Musculoskeletal:         General: No tenderness. Normal range of motion.      Cervical back: Normal range of motion.   Skin:     General: Skin is warm and dry.   Neurological:      Mental Status: She is alert and oriented to person, " place, and time.      Cranial Nerves: No cranial nerve deficit.      Deep Tendon Reflexes: Reflexes are normal and symmetric.         Lab Results   Component Value Date    WBC 9.72 05/01/2025    HGB 12.3 05/01/2025    HCT 40.2 05/01/2025     05/01/2025    CHOL 141 10/01/2024    TRIG 103 10/01/2024    HDL 36 (L) 10/01/2024    ALT 12 04/18/2024    AST 18 04/18/2024     04/18/2024    K 3.3 (L) 04/18/2024     04/18/2024    CREATININE 1.5 (H) 04/18/2024    BUN 35 (H) 04/18/2024    CO2 23 04/18/2024    TSH 0.699 04/18/2024    INR 1.0 10/20/2008    HGBA1C 6.0 (H) 03/01/2024       Assessment:       1. Encounter to establish care    2. Primary hypertension    3. Mixed hyperlipidemia    4. Type 2 diabetes mellitus without complication, without long-term current use of insulin    5. PAC (premature atrial contraction)    6. Anxiety    7. Primary osteoarthritis involving multiple joints    8. Chronic bilateral low back pain without sciatica    9. Carotid atherosclerosis, bilateral    10. Spondylolisthesis of lumbosacral region          Plan:     Lesly was seen today for establish care.    Diagnoses and all orders for this visit:    Encounter to establish care    Primary hypertension  Comments:  on amlodipine 10, losartan-HCTZ 100-25. blood pressure elevated in clinic today.  Orders:  -     CBC Auto Differential; Future  -     Comprehensive Metabolic Panel; Future  -     TSH; Future  -     amLODIPine (NORVASC) 10 MG tablet; Take 1 tablet (10 mg total) by mouth once daily.  -     losartan-hydrochlorothiazide 100-25 mg (HYZAAR) 100-25 mg per tablet; Take 1 tablet by mouth once daily.    Mixed hyperlipidemia  Comments:  on atorvastatin 20 mg daily  Orders:  -     Lipid Panel; Future  -     atorvastatin (LIPITOR) 20 MG tablet; Take 1 tablet (20 mg total) by mouth once daily.    Type 2 diabetes mellitus without complication, without long-term current use of insulin  Comments:  on metformin 500 bid, recheck  a1c  Orders:  -     Hemoglobin A1C; Future  -     Ambulatory referral/consult to Optometry; Future  -     Microalbumin/Creatinine Ratio, Urine; Future  -     metFORMIN (GLUCOPHAGE-XR) 500 MG ER 24hr tablet; Take 1 tablet (500 mg total) by mouth 2 (two) times daily with meals.    PAC (premature atrial contraction)  Comments:  frequent PACs seen on last holter 2024    Anxiety  Comments:  on fluoxetine 20 mg daily  Orders:  -     FLUoxetine 20 MG capsule; Take 1 capsule (20 mg total) by mouth once daily.    Primary osteoarthritis involving multiple joints    Chronic bilateral low back pain without sciatica  Comments:  on going for years, not improved with conservative measures; lidocaine patches ineffective, voltaren gel ineffective. takes tylenol PRN, which does not help  Orders:  -     Ambulatory referral/consult to Spine Care; Future  -     X-Ray Lumbar Spine 5 View; Future    Carotid atherosclerosis, bilateral  Comments:  carotid calcifications seen on CT imaging of neck will obtain ultrasound of carotid arteries.  Orders:  -     US Carotid Bilateral; Future    Spondylolisthesis of lumbosacral region  -     X-Ray Lumbar Spine 5 View; Future    Medication list reviewed with patient, updated to reflect current medications.     Health maintenance reviewed with patient.     Follow up in about 3 months (around 8/1/2025).    Fred Fisher MD  Internal Medicine / Primary Care  [facility]  5/1/2025

## 2025-05-01 NOTE — TELEPHONE ENCOUNTER
----- Message from Fred Fisher MD sent at 5/1/2025  2:46 PM CDT -----  Labs reviewed, no changes to management, follow up in clinic as scheduled.  ----- Message -----  From: Lab, Background User  Sent: 5/1/2025  12:40 PM CDT  To: Fred Fisher MD

## 2025-05-07 NOTE — PROGRESS NOTES
DATE: 5/15/2025  PATIENT: Lesly Jacob    Supervising Physician: Hugh James M.D.    CHIEF COMPLAINT: back pain    HISTORY:  Lesly Jacob is a 79 y.o. female here for initial evaluation of low back and intermittent bilateral leg pain (Back - 10, Leg - 0).  The pain in the low back is what bothers her most.  The pain has been present for years. The patient describes the pain as constant and dull.  The pain is worse with walking and improved by sitting. There is no associated numbness and tingling. There is subjective weakness. She Is only able to walk about 1 block before having to sit down due to pain and weakness. Prior treatments have included Advil and Tylenol, but no PT, LAINE or surgery.  The patient denies myelopathic symptoms such as handwriting changes or difficulty with buttons/coins/keys. Denies perineal paresthesias, bowel/bladder dysfunction.    PAST MEDICAL/SURGICAL HISTORY:  Past Medical History:   Diagnosis Date    Allergy     Angio-edema     Bronchitis     Diabetes mellitus     Hyperlipidemia     Hypertension     Pneumonia     Pneumonia     Syncope 10/3/2013     Past Surgical History:   Procedure Laterality Date    EYE SURGERY      HYSTERECTOMY      SINUS SURGERY         Medications:   Medications Ordered Prior to Encounter[1]    Social History: Social History[2]    REVIEW OF SYSTEMS:  Constitution: Negative. Negative for chills, fever and night sweats.   Cardiovascular: Negative for chest pain and syncope.   Respiratory: Negative for cough and shortness of breath.   Gastrointestinal: See HPI. Negative for nausea/vomiting. Negative for abdominal pain.  Genitourinary: See HPI. Negative for discoloration or dysuria.  Skin: Negative for dry skin, itching and rash.   Hematologic/Lymphatic: Negative for bleeding problem. Does not bruise/bleed easily.   Musculoskeletal: Negative for falls and muscle weakness.   Neurological: See HPI. No seizures.   Endocrine: Negative for polydipsia,  "polyphagia and polyuria.   Allergic/Immunologic: Negative for hives and persistent infections.     EXAM:  Ht 5' 5" (1.651 m)   Wt 81.8 kg (180 lb 5.4 oz)   BMI 30.01 kg/m²     General: The patient is a 79 y.o. female in no apparent distress, the patient is oriented to person, place and time.  Psych: Normal mood and affect  HEENT: Vision grossly intact, hearing intact to the spoken word.  Lungs: Respirations unlabored.  Gait: antalgic station and gait, no difficulty with toe or heel walk.   Skin: Dorsal lumbar skin negative for rashes, lesions, hairy patches and surgical scars. There is moderate lumbar tenderness to palpation.  Range of motion: Lumbar range of motion is acceptable.  Spinal Balance: Global saggital and coronal spinal balance acceptable, not significant for scoliosis and kyphosis.  Musculoskeletal: No pain with the range of motion of the bilateral hips. No trochanteric tenderness to palpation.  Vascular: Bilateral lower extremities warm and well perfused, dorsalis pedis pulses 2+ bilaterally.  Neurological: decreased strength and tone in all major motor groups in the bilateral lower extremities. Normal sensation to light touch in the L2-S1 dermatomes bilaterally.  Deep tendon reflexes symmetric 2+ in the bilateral lower extremities.  Negative Babinski bilaterally. Straight leg raise positive bilaterally.    IMAGING:   Today I personally reviewed AP, Lat and Flex/Ex  upright L-spine films that demonstrate stepwise anterolisthesis from L3-S1      Body mass index is 30.01 kg/m².    Hemoglobin A1C   Date Value Ref Range Status   03/01/2024 6.0 (H) 4.0 - 5.6 % Final     Comment:     ADA Screening Guidelines:  5.7-6.4%  Consistent with prediabetes  >or=6.5%  Consistent with diabetes    High levels of fetal hemoglobin interfere with the HbA1C  assay. Heterozygous hemoglobin variants (HbS, HgC, etc)do  not significantly interfere with this assay.   However, presence of multiple variants may affect " accuracy.     04/17/2012 7.0 (H) 4.0 - 6.2 % Final     Hemoglobin A1c   Date Value Ref Range Status   05/01/2025 6.1 (H) 4.0 - 5.6 % Final     Comment:     ADA Screening Guidelines:  5.7-6.4%  Consistent with prediabetes  >=6.5%  Consistent with diabetes    High levels of fetal hemoglobin interfere with the HbA1C  assay. Heterozygous hemoglobin variants (HbS, HgC, etc)do  not significantly interfere with this assay.   However, presence of multiple variants may affect accuracy.           ASSESSMENT/PLAN:    Lesly was seen today for low-back pain and back pain.    Diagnoses and all orders for this visit:    Dorsalgia, unspecified  -     MRI Lumbar Spine Without Contrast; Future    Chronic bilateral low back pain without sciatica  Comments:  on going for years, not improved with conservative measures; lidocaine patches ineffective, voltaren gel ineffective. takes tylenol PRN, which does not help  Orders:  -     Ambulatory referral/consult to Spine Care  -     WALKER FOR HOME USE  -     X-Ray Lumbar Spine Ap Lateral w/Flex Ext; Future  -     MRI Lumbar Spine Without Contrast; Future  -     gabapentin (NEURONTIN) 100 MG capsule; Take 1 capsule (100 mg total) by mouth 3 (three) times daily.    Spondylolisthesis of lumbar region  -     WALKER FOR HOME USE  -     X-Ray Lumbar Spine Ap Lateral w/Flex Ext; Future  -     MRI Lumbar Spine Without Contrast; Future  -     gabapentin (NEURONTIN) 100 MG capsule; Take 1 capsule (100 mg total) by mouth 3 (three) times daily.      Today we discussed at length all of the different treatment options including anti-inflammatories, acetaminophen, rest, ice, heat, physical therapy including strengthening and stretching exercises, home exercises, ROM, aerobic conditioning, aqua therapy, other modalities including ultrasound, massage, and dry needling, epidural steroid injections and finally surgical intervention.      I also gave the patient precautions regarding cauda equina syndrome  including common symptoms such as perineal paresthesias, urinary retention, incontinence of urine or stool, progressive pain, weakness or paresthesias in the lower extremities. I counseled the patient that if they develop these symptoms they should contact me immediatly or precede to the emergency department.     Sabi ordered  MRI ordered, follow up for results           [1]   Current Outpatient Medications on File Prior to Visit   Medication Sig Dispense Refill    acetaminophen (TYLENOL) 650 MG TbSR Take 1 tablet (650 mg total) by mouth every 8 (eight) hours as needed (mouth pain). 20 tablet 0    amLODIPine (NORVASC) 10 MG tablet Take 1 tablet (10 mg total) by mouth once daily. 90 tablet 3    aspirin (ECOTRIN) 81 MG EC tablet Take 81 mg by mouth once daily.      atorvastatin (LIPITOR) 20 MG tablet Take 1 tablet (20 mg total) by mouth once daily. 90 tablet 3    calcium carbonate-vitamin D3 500 mg-3.125 mcg (125 unit) per tablet Take 1 tablet by mouth once daily.        diclofenac sodium (VOLTAREN ARTHRITIS PAIN) 1 % Gel Apply 2 g topically 2 (two) times daily as needed (pain). 50 g 0    FLUoxetine 20 MG capsule Take 1 capsule (20 mg total) by mouth once daily. 90 capsule 3    HYDROcodone-acetaminophen (NORCO) 5-325 mg per tablet Take 1 tablet by mouth every 6 (six) hours as needed (Moderate to severe pain). 12 tablet 0    losartan-hydrochlorothiazide 100-25 mg (HYZAAR) 100-25 mg per tablet Take 1 tablet by mouth once daily. 90 tablet 3    magic mouthwash diphen/antac/lidoc Swish and spit 5 mLs every 4 (four) hours as needed (pain from mouth sores). for mouth sores 100 mL 0    metFORMIN (GLUCOPHAGE-XR) 500 MG ER 24hr tablet Take 1 tablet (500 mg total) by mouth 2 (two) times daily with meals. 180 tablet 3    methylPREDNISolone (MEDROL DOSEPACK) 4 mg tablet Follow included dosing schedule. 1 each 0    ondansetron (ZOFRAN-ODT) 4 MG TbDL Take 1 tablet (4 mg total) by mouth every 6 (six) hours as needed (nausea). 10  tablet 0     No current facility-administered medications on file prior to visit.   [2]   Social History  Socioeconomic History    Marital status:    Tobacco Use    Smoking status: Never     Passive exposure: Never    Smokeless tobacco: Never   Substance and Sexual Activity    Alcohol use: No    Drug use: No    Sexual activity: Not Currently     Social Drivers of Health     Financial Resource Strain: Low Risk  (3/1/2024)    Overall Financial Resource Strain (CARDIA)     Difficulty of Paying Living Expenses: Not very hard   Food Insecurity: No Food Insecurity (3/1/2024)    Hunger Vital Sign     Worried About Running Out of Food in the Last Year: Never true     Ran Out of Food in the Last Year: Never true   Transportation Needs: No Transportation Needs (3/1/2024)    PRAPARE - Transportation     Lack of Transportation (Medical): No     Lack of Transportation (Non-Medical): No   Physical Activity: Inactive (3/1/2024)    Exercise Vital Sign     Days of Exercise per Week: 0 days     Minutes of Exercise per Session: 0 min   Stress: Stress Concern Present (3/1/2024)    [employer] - Occupational Stress Questionnaire     Feeling of Stress : To some extent   Housing Stability: Low Risk  (3/1/2024)    Housing Stability Vital Sign     Unable to Pay for Housing in the Last Year: No     Number of Places Lived in the Last Year: 1     Unstable Housing in the Last Year: No

## 2025-05-08 ENCOUNTER — PATIENT MESSAGE (OUTPATIENT)
Dept: INTERNAL MEDICINE | Facility: CLINIC | Age: 80
End: 2025-05-08
Payer: MEDICARE

## 2025-05-10 ENCOUNTER — HOSPITAL ENCOUNTER (EMERGENCY)
Facility: HOSPITAL | Age: 80
Discharge: HOME | End: 2025-05-10
Attending: EMERGENCY MEDICINE
Payer: MEDICARE

## 2025-05-10 VITALS
SYSTOLIC BLOOD PRESSURE: 181 MMHG | WEIGHT: 175 LBS | BODY MASS INDEX: 29.88 KG/M2 | RESPIRATION RATE: 16 BRPM | HEART RATE: 62 BPM | DIASTOLIC BLOOD PRESSURE: 74 MMHG | TEMPERATURE: 98 F | OXYGEN SATURATION: 97 % | HEIGHT: 64 IN

## 2025-05-10 DIAGNOSIS — K12.0 APHTHOUS ULCER OF MOUTH: Primary | ICD-10-CM

## 2025-05-10 PROCEDURE — 99283 EMERGENCY DEPT VISIT LOW MDM: CPT

## 2025-05-10 PROCEDURE — 25000003 PHARM REV CODE 250: Performed by: PHYSICIAN ASSISTANT

## 2025-05-10 RX ADMIN — LIDOCAINE HYDROCHLORIDE 5 ML: 20 SOLUTION ORAL; TOPICAL at 08:05

## 2025-05-10 NOTE — ED PROVIDER NOTES
Encounter Date: 5/10/2025       History     Chief Complaint   Patient presents with    Oral Swelling     Arrived to ED pov from home with complaint of tongue swelling x 3 days. Pt was seen recently for same complaint. Denies any sob at this time.      79-year-old female with below medical history presents to the ED for evaluation of tongue pain and swelling.  Patient noticed a painful sore on the underside of her tongue a few days ago.  She states that she has had something similar in the past.  She denies any swelling to her throat, lips.  Denies difficulty swallowing.  She reports some difficulty eating due to the pain on her tongue.  He has attempted treatment with Tylenol with some relief.  She states that she has also switched her mouth with salt water which caused some discomfort.  She states that she was given a medication to swish around her mouth in the past when this occurred previously.       Review of patient's allergies indicates:   Allergen Reactions    Anbesol [benzocaine] Swelling     Oral swelling     Amoxicillin Nausea And Vomiting     Past Medical History:   Diagnosis Date    Allergy     Angio-edema     Bronchitis     Diabetes mellitus     Hyperlipidemia     Hypertension     Pneumonia     Pneumonia     Syncope 10/3/2013     Past Surgical History:   Procedure Laterality Date    EYE SURGERY      HYSTERECTOMY      SINUS SURGERY       Family History   Problem Relation Name Age of Onset    Heart failure Mother      Hypertension Mother      Heart attack Brother       Social History[1]  Review of Systems    Physical Exam     Initial Vitals [05/10/25 0708]   BP Pulse Resp Temp SpO2   (!) 196/80 66 16 98.1 °F (36.7 °C) 98 %      MAP       --         Physical Exam    Nursing note and vitals reviewed.  Constitutional: She appears well-developed and well-nourished. She is not diaphoretic.  Non-toxic appearance. She does not appear ill. No distress.   HENT:   Head: Normocephalic and atraumatic. Mouth/Throat:  Oral lesions (approx 0.5cm ulceration to the inferior aspect of the tongue on the R.) present.   Neck: Neck supple.   Cardiovascular:  Normal rate and regular rhythm.     Exam reveals no gallop and no friction rub.       No murmur heard.  Pulmonary/Chest: Effort normal and breath sounds normal. No accessory muscle usage. No tachypnea. No respiratory distress. She has no decreased breath sounds. She has no wheezes. She has no rhonchi. She has no rales.   Abdominal: She exhibits no distension.   Musculoskeletal:      Cervical back: Neck supple.     Neurological: She is alert.   Skin: No rash noted.   Psychiatric: She has a normal mood and affect. Her behavior is normal.         ED Course   Procedures  Labs Reviewed - No data to display         Imaging Results    None          Medications   (Magic mouthwash) 1:1:1 diphenhydrAMINE(Benadryl) 12.5mg/5ml liq, aluminum & magnesium hydroxide-simethicone (Maalox), LIDOcaine viscous 2% (5 mLs Swish & Spit Given 5/10/25 0818)     Medical Decision Making  79-year-old female presents to the ED with mouth pain/sores.  She is hypertensive at 196/80.  Vitals otherwise within normal limits.  There is a small ulceration to the underside of the tongue on the right.  No oral swelling.  Patient tolerating secretions.  No respiratory distress.   My differential diagnosis includes but is not limited to:  aphthous ulcers, behcet's syndrome, thrush, angioedema    There is no evidence of angioedema or other swelling.  Will give magic mouthwash in the ED and discharge with prescription for same.  Patient also advised to continue salt water swish and spit as well as Tylenol as needed.  Will place a referral to ENT if ulcers are not improving.  ED return precautions given.                                       Clinical Impression:  Final diagnoses:  [K12.0] Aphthous ulcer of mouth (Primary)          ED Disposition Condition    Discharge Stable          ED Prescriptions       Medication Sig  Dispense Start Date End Date Auth. Provider    magic mouthwash diphen/antac/lidoc Swish and spit 5 mLs every 4 (four) hours as needed (pain from mouth sores). for mouth sores 100 mL 5/10/2025 -- Sonia Miller PA-C          Follow-up Information    None              [1]   Social History  Tobacco Use    Smoking status: Never     Passive exposure: Never    Smokeless tobacco: Never   Substance Use Topics    Alcohol use: No    Drug use: No        Sonia Miller PA-C  05/10/25 0915

## 2025-05-10 NOTE — DISCHARGE INSTRUCTIONS
Use the prescription mouthwash as directed.  You can also switch she mouth with warm salt water.  You can also take Tylenol as needed for pain.  Follow-up with your primary doctor or in ear nose and throat clinic if your mouth sores are not healing or if they are worsening.    Return to the ER if you develop any significant swelling to the tongue, throat, lips, difficulty swallowing, fever greater than 100.4° or any other worrisome symptoms.

## 2025-05-10 NOTE — ED TRIAGE NOTES
Oral Swelling (Arrived to ED pov from home with complaint of tongue swelling x 3 days. Pt was seen recently for same complaint. Denies any sob at this time. )

## 2025-05-10 NOTE — ED NOTES
Patient identifiers verified and correct for Lesly Breenhan  LOC: The patient is awake, alert and aware of environment with an appropriate affect, the patient is oriented x 3 and speaking appropriately.   APPEARANCE: Patient appears comfortable and in no acute distress, patient is clean and well groomed.  SKIN: The skin is warm and dry, color consistent with ethnicity, patient has normal skin turgor and moist mucus membranes, skin intact, no breakdown or bruising noted.   MUSCULOSKELETAL: Patient moving all extremities spontaneously, pt has swelling to the tongue. No trouble breathing  RESPIRATORY: Airway is open and patent, respirations are spontaneous, patient has a normal effort and rate, no accessory muscle use noted, O2 Sat 97% on room air.  CARDIAC: Patient has a normal rate and regular rhythm, no edema noted, capillary refill < 3 seconds.   GASTRO: Soft and non tender to palpation, no distention noted, Pt states bowel movements have been regular.  : Pt denies any pain or frequency with urination.  NEURO: Pt opens eyes spontaneously, behavior appropriate to situation, follows commands, facial expression symmetrical, bilateral hand grasp equal and even, purposeful motor response noted, normal sensation in all extremities when touched with a finger.

## 2025-05-12 ENCOUNTER — HOSPITAL ENCOUNTER (EMERGENCY)
Facility: HOSPITAL | Age: 80
Discharge: HOME | End: 2025-05-12
Attending: EMERGENCY MEDICINE
Payer: MEDICARE

## 2025-05-12 VITALS
OXYGEN SATURATION: 98 % | TEMPERATURE: 98 F | SYSTOLIC BLOOD PRESSURE: 154 MMHG | HEART RATE: 62 BPM | HEIGHT: 65 IN | RESPIRATION RATE: 16 BRPM | WEIGHT: 170 LBS | DIASTOLIC BLOOD PRESSURE: 80 MMHG | BODY MASS INDEX: 28.32 KG/M2

## 2025-05-12 DIAGNOSIS — K12.0 APHTHOUS ULCER OF TONGUE: Primary | ICD-10-CM

## 2025-05-12 DIAGNOSIS — R22.0 SWELLING OF BOTH LIPS: ICD-10-CM

## 2025-05-12 PROCEDURE — 96375 TX/PRO/DX INJ NEW DRUG ADDON: CPT

## 2025-05-12 PROCEDURE — 25000003 PHARM REV CODE 250: Performed by: EMERGENCY MEDICINE

## 2025-05-12 PROCEDURE — 96374 THER/PROPH/DIAG INJ IV PUSH: CPT

## 2025-05-12 PROCEDURE — 63600175 PHARM REV CODE 636 W HCPCS: Mod: JZ,TB | Performed by: EMERGENCY MEDICINE

## 2025-05-12 PROCEDURE — 99284 EMERGENCY DEPT VISIT MOD MDM: CPT | Mod: 25

## 2025-05-12 RX ORDER — METHYLPREDNISOLONE SOD SUCC 125 MG
125 VIAL (EA) INJECTION
Status: COMPLETED | OUTPATIENT
Start: 2025-05-12 | End: 2025-05-12

## 2025-05-12 RX ORDER — HYDROCODONE BITARTRATE AND ACETAMINOPHEN 5; 325 MG/1; MG/1
1 TABLET ORAL
Refills: 0 | Status: COMPLETED | OUTPATIENT
Start: 2025-05-12 | End: 2025-05-12

## 2025-05-12 RX ORDER — FAMOTIDINE 10 MG/ML
20 INJECTION, SOLUTION INTRAVENOUS
Status: COMPLETED | OUTPATIENT
Start: 2025-05-12 | End: 2025-05-12

## 2025-05-12 RX ORDER — ONDANSETRON 4 MG/1
4 TABLET, ORALLY DISINTEGRATING ORAL EVERY 6 HOURS PRN
Qty: 10 TABLET | Refills: 0 | Status: SHIPPED | OUTPATIENT
Start: 2025-05-12

## 2025-05-12 RX ORDER — DIPHENHYDRAMINE HYDROCHLORIDE 50 MG/ML
12.5 INJECTION, SOLUTION INTRAMUSCULAR; INTRAVENOUS
Status: COMPLETED | OUTPATIENT
Start: 2025-05-12 | End: 2025-05-12

## 2025-05-12 RX ORDER — HYDROCODONE BITARTRATE AND ACETAMINOPHEN 5; 325 MG/1; MG/1
1 TABLET ORAL EVERY 6 HOURS PRN
Qty: 12 TABLET | Refills: 0 | Status: SHIPPED | OUTPATIENT
Start: 2025-05-12

## 2025-05-12 RX ORDER — METHYLPREDNISOLONE 4 MG/1
TABLET ORAL
Qty: 1 EACH | Refills: 0 | Status: SHIPPED | OUTPATIENT
Start: 2025-05-12

## 2025-05-12 RX ADMIN — METHYLPREDNISOLONE SODIUM SUCCINATE 125 MG: 125 INJECTION, POWDER, FOR SOLUTION INTRAMUSCULAR; INTRAVENOUS at 01:05

## 2025-05-12 RX ADMIN — FAMOTIDINE 20 MG: 10 INJECTION, SOLUTION INTRAVENOUS at 01:05

## 2025-05-12 RX ADMIN — HYDROCODONE BITARTRATE AND ACETAMINOPHEN 1 TABLET: 5; 325 TABLET ORAL at 11:05

## 2025-05-12 RX ADMIN — DIPHENHYDRAMINE HYDROCHLORIDE 12.5 MG: 50 INJECTION, SOLUTION INTRAMUSCULAR; INTRAVENOUS at 01:05

## 2025-05-12 NOTE — ED PROVIDER NOTES
Emergency Department Provider Note    Lesly Jacob   79 y.o. female   [id number]      5/12/2025       History     This history was obtained from the patient without limitations.  She presented by personal transportation.    She is an 79-year-old with the below past medical history who presents with pain to the right side of her tongue that began one week ago and worsened yesterday.  She has associated swelling and discomfort to the right side of her upper and lower lips that began yesterday as well.  She was evaluated here in the ED for these complaints two days ago and prescribed magic mouthwash.  She reports no improvement with magic mouth wash, acetaminophen, and Orajel.  She has associated right-sided earache.  She denies fever and chills.  She denies headache, lightheadedness, and dizziness.  She denies nausea and vomiting.  She denies sore throat and difficulty swallowing.  She denies chest pain, palpitations, and shortness of breath.             Past Medical History:   Diagnosis Date    Allergy     Angio-edema     Bronchitis     Diabetes mellitus     Hyperlipidemia     Hypertension     Pneumonia     Pneumonia     Syncope 10/3/2013      Past Surgical History:   Procedure Laterality Date    EYE SURGERY      HYSTERECTOMY      SINUS SURGERY        Family History   Problem Relation Name Age of Onset    Heart failure Mother      Hypertension Mother      Heart attack Brother        Social History     Socioeconomic History    Marital status:    Tobacco Use    Smoking status: Never     Passive exposure: Never    Smokeless tobacco: Never   Substance and Sexual Activity    Alcohol use: No    Drug use: No    Sexual activity: Not Currently     Social Drivers of Health     Financial Resource Strain: Low Risk  (3/1/2024)    Overall Financial Resource Strain (CARDIA)     Difficulty of Paying Living Expenses: Not very hard   Food Insecurity: No Food Insecurity (3/1/2024)    Hunger Vital Sign     Worried About  Running Out of Food in the Last Year: Never true     Ran Out of Food in the Last Year: Never true   Transportation Needs: No Transportation Needs (3/1/2024)    PRAPARE - Transportation     Lack of Transportation (Medical): No     Lack of Transportation (Non-Medical): No   Physical Activity: Inactive (3/1/2024)    Exercise Vital Sign     Days of Exercise per Week: 0 days     Minutes of Exercise per Session: 0 min   Stress: Stress Concern Present (3/1/2024)    [employer] - Occupational Stress Questionnaire     Feeling of Stress : To some extent   Housing Stability: Low Risk  (3/1/2024)    Housing Stability Vital Sign     Unable to Pay for Housing in the Last Year: No     Number of Places Lived in the Last Year: 1     Unstable Housing in the Last Year: No      Review of patient's allergies indicates:   Allergen Reactions    Anbesol [benzocaine] Swelling     Oral swelling     Amoxicillin Nausea And Vomiting           Physical Examination     Initial Vitals [05/12/25 0952]   BP Pulse Resp Temp SpO2   (!) 155/70 66 15 98.6 °F (37 °C) 98 %      MAP       --           Physical Exam    Nursing note and vitals reviewed.  Constitutional: She is not diaphoretic. No distress.   HENT:   Head: Normocephalic and atraumatic.   Right Ear: Hearing, tympanic membrane, external ear and ear canal normal.   No facial swelling or tenderness.  All maxillary teeth absent.  No gum abnormalities.  There is a shallow ulcer on the undersurface of the right side of the tongue that is very tender.  There was no bleeding from this lesion.  There was no tongue swelling.  There is no pooling of secretions.  Oropharynx is without erythema, swelling, or exudates.  Mild swelling and tenderness to the right side of the upper and lower lips.   Eyes: Conjunctivae are normal. No scleral icterus.   Neck: Phonation normal.   Cardiovascular:  Normal rate, regular rhythm and normal heart sounds.     Exam reveals no gallop and no  friction rub.       No murmur heard.  Pulmonary/Chest: No respiratory distress. She has no wheezes. She has no rhonchi. She has no rales.   Abdominal: Abdomen is soft. There is no abdominal tenderness.     Neurological: She is alert and oriented to person, place, and time. GCS score is 15. GCS eye subscore is 4. GCS verbal subscore is 5. GCS motor subscore is 6.   Skin: Skin is warm and dry. No pallor.            Labs     None       Imaging     Imaging Results    None           ED Course     The patient received the following medications:  Medications   HYDROcodone-acetaminophen 5-325 mg per tablet 1 tablet (1 tablet Oral Given 5/12/25 1103)   methylPREDNISolone sodium succinate injection 125 mg (125 mg Intravenous Given 5/12/25 1307)   famotidine (PF) injection 20 mg (20 mg Intravenous Given 5/12/25 1308)   diphenhydrAMINE injection 12.5 mg (12.5 mg Intravenous Given 5/12/25 1307)           ED Course as of 05/14/25 0114   Mon May 12, 2025   1207 The patient was asleep when I just went to reassess her.  She awakened easily to voice and light touch.  Her tongue pain has improved.  She still feels like her lips are swollen. [LP]   1434 I just reassessed the patient again.  She was asleep and awakened easily.  She reported improvement in her lip swelling. [LP]      ED Course User Index  [LP] Mario Alberto Miller III, MD        Medical Decision Making                 Medical Decision Making  Risk  Prescription drug management.              Diagnoses       ICD-10-CM ICD-9-CM   1. Aphthous ulcer of tongue  K12.0 528.2   2. Swelling of both lips  R22.0 784.2         Dispostion      ED Disposition Condition    Discharge Stable            ED Prescriptions       Medication Sig Dispense Start Date End Date Auth. Provider    HYDROcodone-acetaminophen (NORCO) 5-325 mg per tablet Take 1 tablet by mouth every 6 (six) hours as needed (Moderate to severe pain). 12 tablet 5/12/2025 -- Mario Alberto Miller III, MD    methylPREDNISolone (MEDROL  DOSEPACK) 4 mg tablet Follow included dosing schedule. 1 each 5/12/2025 -- Mario Alberto Miller III, MD    ondansetron (ZOFRAN-ODT) 4 MG TbDL Take 1 tablet (4 mg total) by mouth every 6 (six) hours as needed (nausea). 10 tablet 5/12/2025 -- Mario Alberto Miller III, MD            Follow-up Information       Follow up With Specialties Details Why Contact Info    Fred Fisher MD Internal Medicine  Schedule a close in-person or virtual ER follow-up visit with your primary care provider. [facility] [address]  [phone]      ER   Return to the ER if your condition worsens or you have any other concerns that you feel need immediate attention.               Mario Alberto Miller III, MD  05/14/25 0114

## 2025-05-12 NOTE — ED NOTES
Pt identifiers Juan Carlosa Stripplin Fielder were checked and are correct  LOC: The patient is awake, alert, aware of environment with an appropriate affect. Oriented x4, speaking appropriately  APPEARANCE: Pt rates tongue pain a 10/10 , in no acute distress, pt is clean and well groomed, clothing properly fastened  SKIN: Skin warm, dry and intact, normal skin turgor, moist mucus membranes Wound noted on right side of tongue  RESPIRATORY: Airway is open and patent, respirations are spontaneous, even and unlabored, normal effort and rate  CARDIAC: Normal rate and rhythm, no peripheral edema noted, capillary refill < 3 seconds, bilateral radial pulses 2+  ABDOMEN: Soft, nontender, nondistended. Bowel sounds present   NEUROLOGIC: PERRL, facial expression is symmetrical, patient moving all extremities spontaneously, normal sensation in all extremities when touched with a finger.  Follows all commands appropriately  MUSCULOSKELETAL: No obvious deformities.

## 2025-05-12 NOTE — DISCHARGE INSTRUCTIONS
For pain:  Alternate acetaminophen 650 mg every 4-6 hours and ibuprofen 400-600 mg every 6-8 hours as needed for pain.  Only take the prescribed pain medication (hydrocodone-acetaminophen) for moderate to severe pain that does not improve after attempting treatment with acetaminophen and ibuprofen.    Take Benadryl 25 mg every 4-6 hours as needed for lip swelling.  You were also prescribed a course of steroids (methylprednisolone) for this problem.    We know that you have many choices and are honored that you chose us. We hope that we met or exceeded your expectations and goals for this visit and will keep the [facility] family in mind for your future needs and those of your family and friends.     - Dr. Miller

## 2025-05-12 NOTE — ED TRIAGE NOTES
Pt complains of swelling to tongue and right lower lip starting 5 days ago  States was seen in the ED 5/01/2025 for same symptoms and now the symptoms are worse

## 2025-05-15 ENCOUNTER — OFFICE VISIT (OUTPATIENT)
Dept: ORTHOPEDICS | Facility: CLINIC | Age: 80
End: 2025-05-15
Payer: MEDICARE

## 2025-05-15 VITALS — HEIGHT: 65 IN | WEIGHT: 180.31 LBS | BODY MASS INDEX: 30.04 KG/M2

## 2025-05-15 DIAGNOSIS — M54.50 CHRONIC BILATERAL LOW BACK PAIN WITHOUT SCIATICA: ICD-10-CM

## 2025-05-15 DIAGNOSIS — G89.29 CHRONIC BILATERAL LOW BACK PAIN WITHOUT SCIATICA: ICD-10-CM

## 2025-05-15 DIAGNOSIS — M43.16 SPONDYLOLISTHESIS OF LUMBAR REGION: ICD-10-CM

## 2025-05-15 DIAGNOSIS — M54.9 DORSALGIA, UNSPECIFIED: Primary | ICD-10-CM

## 2025-05-15 PROCEDURE — 1101F PT FALLS ASSESS-DOCD LE1/YR: CPT | Mod: CPTII,S$GLB,, | Performed by: REGISTERED NURSE

## 2025-05-15 PROCEDURE — 1125F AMNT PAIN NOTED PAIN PRSNT: CPT | Mod: CPTII,S$GLB,, | Performed by: REGISTERED NURSE

## 2025-05-15 PROCEDURE — 1159F MED LIST DOCD IN RCRD: CPT | Mod: CPTII,S$GLB,, | Performed by: REGISTERED NURSE

## 2025-05-15 PROCEDURE — 99999 PR PBB SHADOW E&M-EST. PATIENT-LVL IV: CPT | Mod: PBBFAC,,, | Performed by: REGISTERED NURSE

## 2025-05-15 PROCEDURE — 3288F FALL RISK ASSESSMENT DOCD: CPT | Mod: CPTII,S$GLB,, | Performed by: REGISTERED NURSE

## 2025-05-15 PROCEDURE — 99204 OFFICE O/P NEW MOD 45 MIN: CPT | Mod: S$GLB,,, | Performed by: REGISTERED NURSE

## 2025-05-15 RX ORDER — GABAPENTIN 100 MG/1
100 CAPSULE ORAL 3 TIMES DAILY
Qty: 90 CAPSULE | Refills: 11 | Status: SHIPPED | OUTPATIENT
Start: 2025-05-15 | End: 2026-05-15

## 2025-05-15 NOTE — PROGRESS NOTES
"DATE: 5/29/2025  PATIENT: Lesly Jacob    Attending Physician: Hugh James M.D.    HISTORY:  Lesly Jacob is a 79 y.o. female who returns to me today for MRI results.  She was last seen by me 5/15/2025.  Today she is doing well but notes low back pain.  The Patient denies myelopathic symptoms such as handwriting changes or difficulty with buttons/coins/keys. Denies perineal paresthesias, bowel/bladder dysfunction.    EXAM:  Ht 5' 5" (1.651 m)   Wt 82.4 kg (181 lb 10.5 oz)   BMI 30.23 kg/m²   Stable     IMAGING:  Today I personally re- reviewed AP, Lat and Flex/Ex  upright L-spine that demonstrate stepwise anterolisthesis from L3-S1     Lumbar MRI shows moderate stenosis at L3-4 with foraminal narrowing from L3-S1.     Body mass index is 30.23 kg/m².    Hemoglobin A1C   Date Value Ref Range Status   03/01/2024 6.0 (H) 4.0 - 5.6 % Final     Comment:     ADA Screening Guidelines:  5.7-6.4%  Consistent with prediabetes  >or=6.5%  Consistent with diabetes    High levels of fetal hemoglobin interfere with the HbA1C  assay. Heterozygous hemoglobin variants (HbS, HgC, etc)do  not significantly interfere with this assay.   However, presence of multiple variants may affect accuracy.     04/17/2012 7.0 (H) 4.0 - 6.2 % Final     Hemoglobin A1c   Date Value Ref Range Status   05/01/2025 6.1 (H) 4.0 - 5.6 % Final     Comment:     ADA Screening Guidelines:  5.7-6.4%  Consistent with prediabetes  >=6.5%  Consistent with diabetes    High levels of fetal hemoglobin interfere with the HbA1C  assay. Heterozygous hemoglobin variants (HbS, HgC, etc)do  not significantly interfere with this assay.   However, presence of multiple variants may affect accuracy.         ASSESSMENT/PLAN:    Lesly was seen today for low-back pain and follow-up.    Diagnoses and all orders for this visit:    Lumbar radiculopathy      She will be scheduled in the clinic with pain mgmt for RFAs  No surgery warranted at this time  Follow " up as needed

## 2025-05-26 ENCOUNTER — HOSPITAL ENCOUNTER (OUTPATIENT)
Dept: RADIOLOGY | Facility: HOSPITAL | Age: 80
Discharge: HOME | End: 2025-05-26
Attending: REGISTERED NURSE
Payer: MEDICARE

## 2025-05-26 DIAGNOSIS — M54.9 DORSALGIA, UNSPECIFIED: ICD-10-CM

## 2025-05-26 DIAGNOSIS — M54.50 CHRONIC BILATERAL LOW BACK PAIN WITHOUT SCIATICA: ICD-10-CM

## 2025-05-26 DIAGNOSIS — G89.29 CHRONIC BILATERAL LOW BACK PAIN WITHOUT SCIATICA: ICD-10-CM

## 2025-05-26 DIAGNOSIS — M43.16 SPONDYLOLISTHESIS OF LUMBAR REGION: ICD-10-CM

## 2025-05-26 PROCEDURE — 72148 MRI LUMBAR SPINE W/O DYE: CPT | Mod: 26,,, | Performed by: INTERNAL MEDICINE

## 2025-05-26 PROCEDURE — 72110 X-RAY EXAM L-2 SPINE 4/>VWS: CPT | Mod: TC

## 2025-05-26 PROCEDURE — 72110 X-RAY EXAM L-2 SPINE 4/>VWS: CPT | Mod: 26,,, | Performed by: RADIOLOGY

## 2025-05-26 PROCEDURE — 72148 MRI LUMBAR SPINE W/O DYE: CPT | Mod: TC

## 2025-05-29 ENCOUNTER — OFFICE VISIT (OUTPATIENT)
Dept: ORTHOPEDICS | Facility: CLINIC | Age: 80
End: 2025-05-29
Payer: MEDICARE

## 2025-05-29 VITALS — WEIGHT: 181.69 LBS | BODY MASS INDEX: 30.27 KG/M2 | HEIGHT: 65 IN

## 2025-05-29 DIAGNOSIS — M54.16 LUMBAR RADICULOPATHY: Primary | ICD-10-CM

## 2025-05-29 PROCEDURE — 1125F AMNT PAIN NOTED PAIN PRSNT: CPT | Mod: CPTII,S$GLB,, | Performed by: REGISTERED NURSE

## 2025-05-29 PROCEDURE — 1101F PT FALLS ASSESS-DOCD LE1/YR: CPT | Mod: CPTII,S$GLB,, | Performed by: REGISTERED NURSE

## 2025-05-29 PROCEDURE — 1160F RVW MEDS BY RX/DR IN RCRD: CPT | Mod: CPTII,S$GLB,, | Performed by: REGISTERED NURSE

## 2025-05-29 PROCEDURE — 99999 PR PBB SHADOW E&M-EST. PATIENT-LVL III: CPT | Mod: PBBFAC,,, | Performed by: REGISTERED NURSE

## 2025-05-29 PROCEDURE — 99213 OFFICE O/P EST LOW 20 MIN: CPT | Mod: S$GLB,,, | Performed by: REGISTERED NURSE

## 2025-05-29 PROCEDURE — 3288F FALL RISK ASSESSMENT DOCD: CPT | Mod: CPTII,S$GLB,, | Performed by: REGISTERED NURSE

## 2025-05-29 PROCEDURE — 1159F MED LIST DOCD IN RCRD: CPT | Mod: CPTII,S$GLB,, | Performed by: REGISTERED NURSE

## 2025-06-09 ENCOUNTER — TELEPHONE (OUTPATIENT)
Dept: PAIN MEDICINE | Facility: CLINIC | Age: 80
End: 2025-06-09
Payer: MEDICARE

## 2025-06-09 NOTE — TELEPHONE ENCOUNTER
"Left a voicemail on each number listed, confirming appointment location & time on 06/10/25 with Dr. Garcia..      "  You  [place] Taihra Newman now (9:41 AM)     GS  Ms. Jacob,  Good morning. I left a voicemail earlier today, reminding you of your appointment with Dr. Garcia on Tuesday, Michelle 10, 2025 at 9:00 am.       We are located at:  [facility]  9th Floor, [address]  [place] [address]     See you then!     Thank you,  OCHOA Knight LPN    This [facility] [employer] message has not been read.   "  "

## 2025-06-10 ENCOUNTER — OFFICE VISIT (OUTPATIENT)
Dept: PAIN MEDICINE | Facility: CLINIC | Age: 80
End: 2025-06-10
Payer: MEDICARE

## 2025-06-10 VITALS
WEIGHT: 180.75 LBS | TEMPERATURE: 98 F | HEIGHT: 65 IN | BODY MASS INDEX: 30.12 KG/M2 | OXYGEN SATURATION: 100 % | RESPIRATION RATE: 18 BRPM | HEART RATE: 63 BPM | DIASTOLIC BLOOD PRESSURE: 65 MMHG | SYSTOLIC BLOOD PRESSURE: 130 MMHG

## 2025-06-10 DIAGNOSIS — M51.360 DEGENERATION OF INTERVERTEBRAL DISC OF LUMBAR REGION WITH DISCOGENIC BACK PAIN: ICD-10-CM

## 2025-06-10 DIAGNOSIS — M47.816 FACET ARTHRITIS OF LUMBAR REGION: ICD-10-CM

## 2025-06-10 DIAGNOSIS — M47.816 LUMBAR SPONDYLOSIS: Primary | ICD-10-CM

## 2025-06-10 PROCEDURE — 1125F AMNT PAIN NOTED PAIN PRSNT: CPT | Mod: CPTII,S$GLB,, | Performed by: ANESTHESIOLOGY

## 2025-06-10 PROCEDURE — 99999 PR PBB SHADOW E&M-EST. PATIENT-LVL V: CPT | Mod: PBBFAC,,, | Performed by: ANESTHESIOLOGY

## 2025-06-10 PROCEDURE — 99214 OFFICE O/P EST MOD 30 MIN: CPT | Mod: GC,S$GLB,, | Performed by: ANESTHESIOLOGY

## 2025-06-10 PROCEDURE — 3075F SYST BP GE 130 - 139MM HG: CPT | Mod: CPTII,S$GLB,, | Performed by: ANESTHESIOLOGY

## 2025-06-10 PROCEDURE — 1159F MED LIST DOCD IN RCRD: CPT | Mod: CPTII,S$GLB,, | Performed by: ANESTHESIOLOGY

## 2025-06-10 PROCEDURE — 1100F PTFALLS ASSESS-DOCD GE2>/YR: CPT | Mod: CPTII,S$GLB,, | Performed by: ANESTHESIOLOGY

## 2025-06-10 PROCEDURE — G2211 COMPLEX E/M VISIT ADD ON: HCPCS | Mod: S$GLB,,, | Performed by: ANESTHESIOLOGY

## 2025-06-10 PROCEDURE — 1160F RVW MEDS BY RX/DR IN RCRD: CPT | Mod: CPTII,S$GLB,, | Performed by: ANESTHESIOLOGY

## 2025-06-10 PROCEDURE — 3288F FALL RISK ASSESSMENT DOCD: CPT | Mod: CPTII,S$GLB,, | Performed by: ANESTHESIOLOGY

## 2025-06-10 PROCEDURE — 3078F DIAST BP <80 MM HG: CPT | Mod: CPTII,S$GLB,, | Performed by: ANESTHESIOLOGY

## 2025-06-10 RX ORDER — GABAPENTIN 300 MG/1
300 CAPSULE ORAL NIGHTLY
Qty: 30 CAPSULE | Refills: 11 | Status: SHIPPED | OUTPATIENT
Start: 2025-06-10 | End: 2026-06-10

## 2025-06-10 NOTE — H&P (VIEW-ONLY)
Chronic Pain - New Consult    Referring Physician: CHEMO Clayton NP    Chief Complaint:   Chief Complaint   Patient presents with    Low-back Pain     SUBJECTIVE:    Lesly Jacob presents to the clinic for the evaluation of lower back pain with intermittent radiation down her lower extremities alone the L4/5 distribution. The back is worse than the radicular symptoms. The pain started several years ago following and symptoms have been unchanged. The pain is described as aching, burning, crushing, dull, sharp, and shooting and is rated as 10/10.  Her pain is worse with bending backwards.  She can not walk more than 1 block without stopping.  Patient find relief with leaning onto the sink and shopping cart.  The pain is rated with a score of  8/10 on the BEST day and a score of 10/10 on the WORST day.  Symptoms interfere with daily activity, sleeping, and work. The pain is exacerbated by Standing, Bending, Walking, Lifting, and Getting out of bed/chair.  The pain is mitigated by nothing. She reports spending 2 hours per day reclining. The patient reports 2 hours of uninterrupted sleep per night.    Patient denies night fever/night sweats, urinary incontinence, bowel incontinence, significant weight loss, significant motor weakness, and loss of sensations.    Physical Therapy/Home Exercise: yes      Pain Disability Index Review:      6/10/2025     8:51 AM   Last 3 PDI Scores   Pain Disability Index (PDI) 50     Pain Medications: Tylenol   Gabapentin - 100 mg TID   Norco 5-325 - Short term Rx from ED visit.      report:  Reviewed and appears appropriate.     Pain Procedures: None     Imaging:   MRI LUMBAR SPINE WITHOUT CONTRAST     FINDINGS:  The examination was terminated prematurely due to claustrophobia.  Axial T1 sequences were not obtained.     ALIGNMENT: Stepwise grade 1 anterolisthesis spanning L3-S1.     BONE: No pars defect or compression fracture.  No marrow replacing lesions.     JOINT:  Multilevel degenerative changes with disc desiccation, disc height loss, and facet arthropathy.  There is mild endplate edema (Modic 1 change) at T11-12 and at each level from L3-S1.  There is subchondral bone marrow edema involving multiple facet joints bilaterally in the lower lumbar spine.     SPINAL CANAL: The conus medullaris has a normal appearance and terminates at the L1 level.  Cauda equina nerve roots are unremarkable.  No mass or collection.     PARASPINAL SOFT TISSUES: There is a 1.6 cm nodule near the right adrenal gland (9:9), which is nonspecific but unchanged in size from 07/27/2022.  Right renal cyst.  Paraspinal muscle atrophy.     SIGNIFICANT FINDINGS BY LEVEL:     T12-L1: Unremarkable.     L1-2: Unremarkable.     L2-3: Mild disc bulge.  Ligamentum flavum thickening.  No canal stenosis.  Mild bilateral foraminal stenosis.     L3-4: Disc bulge and posterior disc uncovering.  Advanced facet arthropathy and ligamentum flavum thickening.  Small bilateral facet joint effusions/synovitis, with a 1.5 cm extra canalicular synovial cyst on the right.  Mild canal stenosis.  Moderate bilateral foraminal stenosis.     L4-5: Disc bulge and posterior disc uncovering, eccentric to the right.  Advanced facet arthropathy and ligamentum flavum thickening.  Right facet joint effusion/synovitis.  Mild canal stenosis.  Encroachment on the right descending L5 nerve root in the lateral recess.  Severe right and moderate left foraminal stenosis.     L5-S1: Disc bulge and posterior disc uncovering.  Ligamentum flavum thickening.  No canal stenosis.  Mild bilateral foraminal stenosis.     Impression:     The examination was terminated prematurely due to claustrophobia.  Axial T1 sequences were not obtained.     Multilevel degenerative changes, most advanced at L3-4 and L4-5 as detailed above.     Inflammation of several facet joints in the lower lumbar spine with small effusions/synovitis and subchondral edema, likely  degenerative.    Past Medical History:   Diagnosis Date    Allergy     Angio-edema     Bronchitis     Diabetes mellitus     Hyperlipidemia     Hypertension     Pneumonia     Pneumonia     Syncope 10/3/2013     Past Surgical History:   Procedure Laterality Date    EYE SURGERY      HYSTERECTOMY      SINUS SURGERY       Social History[1]  Family History   Problem Relation Name Age of Onset    Heart failure Mother      Hypertension Mother      Heart attack Brother       Review of patient's allergies indicates:   Allergen Reactions    Anbesol [benzocaine] Swelling     Oral swelling     Amoxicillin Nausea And Vomiting     Current Outpatient Medications   Medication Sig    acetaminophen (TYLENOL) 650 MG TbSR Take 1 tablet (650 mg total) by mouth every 8 (eight) hours as needed (mouth pain).    amLODIPine (NORVASC) 10 MG tablet Take 1 tablet (10 mg total) by mouth once daily.    aspirin (ECOTRIN) 81 MG EC tablet Take 81 mg by mouth once daily.    atorvastatin (LIPITOR) 20 MG tablet Take 1 tablet (20 mg total) by mouth once daily.    calcium carbonate-vitamin D3 500 mg-3.125 mcg (125 unit) per tablet Take 1 tablet by mouth once daily.      diclofenac sodium (VOLTAREN ARTHRITIS PAIN) 1 % Gel Apply 2 g topically 2 (two) times daily as needed (pain).    FLUoxetine 20 MG capsule Take 1 capsule (20 mg total) by mouth once daily.    gabapentin (NEURONTIN) 100 MG capsule Take 1 capsule (100 mg total) by mouth 3 (three) times daily.    HYDROcodone-acetaminophen (NORCO) 5-325 mg per tablet Take 1 tablet by mouth every 6 (six) hours as needed (Moderate to severe pain).    losartan-hydrochlorothiazide 100-25 mg (HYZAAR) 100-25 mg per tablet Take 1 tablet by mouth once daily.    magic mouthwash diphen/antac/lidoc Swish and spit 5 mLs every 4 (four) hours as needed (pain from mouth sores). for mouth sores    metFORMIN (GLUCOPHAGE-XR) 500 MG ER 24hr tablet Take 1 tablet (500 mg total) by mouth 2 (two) times daily with meals.     "ondansetron (ZOFRAN-ODT) 4 MG TbDL Take 1 tablet (4 mg total) by mouth every 6 (six) hours as needed (nausea).     No current facility-administered medications for this visit.     REVIEW OF SYSTEMS:    GENERAL:  No weight loss, malaise or fevers.  HEENT:  Negative for frequent or significant headaches.  NECK:  Negative for lumps, goiter, pain and significant neck swelling.  RESPIRATORY:  Negative for cough, wheezing or shortness of breath.  CARDIOVASCULAR:  Negative for chest pain, leg swelling or palpitations.  GI:  Negative for abdominal discomfort, blood in stools or black stools or change in bowel habits.  MUSCULOSKELETAL:  See HPI.  SKIN:  Negative for lesions, rash, and itching.  PSYCH:  Negative for sleep disturbance, mood disorder and recent psychosocial stressors.  HEMATOLOGY/LYMPHOLOGY:  Negative for prolonged bleeding, bruising easily or swollen nodes.  NEURO:   No history of headaches, syncope, paralysis, seizures or tremors.  All other reviewed and negative other than HPI.    OBJECTIVE:    /65 (BP Location: Right arm, Patient Position: Sitting)   Pulse 63   Temp 98 °F (36.7 °C) (Oral)   Resp 18   Ht 5' 5" (1.651 m)   Wt 82 kg (180 lb 12.4 oz)   SpO2 100%   BMI 30.08 kg/m²     PHYSICAL EXAMINATION:    GENERAL: Well appearing, in no acute distress, alert and oriented x3.  PSYCH:  Mood and affect appropriate.  SKIN: Skin color, texture, turgor normal, no rashes or lesions.  HEAD/FACE:  Normocephalic, atraumatic. Cranial nerves grossly intact.  NECK: Limited ROM. Supple. No pain to palpation over the cervical paraspinous muscles. Spurling Negative. No pain with neck flexion, extension, or lateral rotation.   CV: RRR with palpation of the radial artery.  PULM: No evidence of respiratory difficulty, symmetric chest rise.  GI:  Soft and non-distended.  MSK: Straight leg raising is  negative to radicular pain. Pain to palpation over the facet joints of the lumbar spine. Pain with lumbar facet " loading. Positive Mailgram's. No pain over the SI joints. Sacral Thrust is negative. JASKARAN test is positive bilaterally. Gaenslen Test is negative. No pain over the GBT bilaterally.  Limited range of motion of the lumbar spine with pain reproduction.  Peripheral joint ROM is full and pain free without obvious instability or laxity in all four extremities. No obvious deformities, edema, or skin discoloration.  No atrophy or tone abnormalities are noted.   NEURO: Bilateral upper and lower extremity coordination and strength is symmetric.  No loss of sensation is noted.  MENTAL STATUS: A x O x 3, good concentration, speech is fluent and goal directed  MOTOR: 5/5 in all muscle groups  GAIT: Antalgic. Ambulates unassisted.    ASSESSMENT: 79 y.o. year old female with lower pain, consistent with      1. Lumbar spondylosis  Ambulatory Referral/Consult to Physical Therapy    Procedure Order to Pain Management      2. Facet arthritis of lumbar region  Ambulatory Referral/Consult to Physical Therapy    Procedure Order to Pain Management      3. Degeneration of intervertebral disc of lumbar region with discogenic back pain          PLAN:     Images reviewed and discussed with patient.  Schedule patient for Bilateral L3/L4 and L4/L5 facet joint injection and right L3/L4 facet synovial cyst aspiration/Rupture   Referral to physical therapy.  Instructed patient to increase gabapentin to 300 mg at night.  Can continue with 100 mg during the day.  Consider bilateral L3, L4 and L5 MBB and RFA as a future therapy.  Consider caudal epidural for spinal canal stenosis symptoms as a future therapy.  Counseled patient regarding the importance of activity modification, smoking cessation, alcohol cessation, constant sleeping habits, and physical therapy.  Follow up 2 weeks after procedure.    Jason Delgado MD PGY-5  Interventional Pain Medicine Fellow   [facility]     I spent a total of 30 minutes on the day of the  visit.  This includes face to face time and non-face to face time preparing to see the patient by reviewing previous labs/imaging, obtaining and/or reviewing separately obtained history, documenting clinical information in the electronic or other health record, independently interpreting results and communicating results to the patient/family/caregiver. This note was generated with the assistance of ambient listening technology. Verbal consent was obtained by the patient and accompanying visitor(s) for the recording of patient appointment to facilitate this note. I attest to having reviewed and edited the generated note for accuracy, though some syntax or spelling errors may persist. Please contact the author of this note for any clarification.       Johnnie Garcia        [1]   Social History  Socioeconomic History    Marital status:    Tobacco Use    Smoking status: Never     Passive exposure: Never    Smokeless tobacco: Never   Substance and Sexual Activity    Alcohol use: No    Drug use: No    Sexual activity: Not Currently     Social Drivers of Health     Financial Resource Strain: Low Risk  (3/1/2024)    Overall Financial Resource Strain (CARDIA)     Difficulty of Paying Living Expenses: Not very hard   Food Insecurity: No Food Insecurity (3/1/2024)    Hunger Vital Sign     Worried About Running Out of Food in the Last Year: Never true     Ran Out of Food in the Last Year: Never true   Transportation Needs: No Transportation Needs (3/1/2024)    PRAPARE - Transportation     Lack of Transportation (Medical): No     Lack of Transportation (Non-Medical): No   Physical Activity: Inactive (3/1/2024)    Exercise Vital Sign     Days of Exercise per Week: 0 days     Minutes of Exercise per Session: 0 min   Stress: Stress Concern Present (3/1/2024)    [employer] - Occupational Stress Questionnaire     Feeling of Stress : To some extent   Housing Stability: Low Risk  (3/1/2024)     Housing Stability Vital Sign     Unable to Pay for Housing in the Last Year: No     Number of Places Lived in the Last Year: 1     Unstable Housing in the Last Year: No

## 2025-06-10 NOTE — PROGRESS NOTES
Chronic Pain - New Consult    Referring Physician: CHEMO Clayton NP    Chief Complaint:   Chief Complaint   Patient presents with    Low-back Pain     SUBJECTIVE:    Lesly Jacob presents to the clinic for the evaluation of lower back pain with intermittent radiation down her lower extremities alone the L4/5 distribution. The back is worse than the radicular symptoms. The pain started several years ago following and symptoms have been unchanged. The pain is described as aching, burning, crushing, dull, sharp, and shooting and is rated as 10/10.  Her pain is worse with bending backwards.  She can not walk more than 1 block without stopping.  Patient find relief with leaning onto the sink and shopping cart.  The pain is rated with a score of  8/10 on the BEST day and a score of 10/10 on the WORST day.  Symptoms interfere with daily activity, sleeping, and work. The pain is exacerbated by Standing, Bending, Walking, Lifting, and Getting out of bed/chair.  The pain is mitigated by nothing. She reports spending 2 hours per day reclining. The patient reports 2 hours of uninterrupted sleep per night.    Patient denies night fever/night sweats, urinary incontinence, bowel incontinence, significant weight loss, significant motor weakness, and loss of sensations.    Physical Therapy/Home Exercise: yes      Pain Disability Index Review:      6/10/2025     8:51 AM   Last 3 PDI Scores   Pain Disability Index (PDI) 50     Pain Medications: Tylenol   Gabapentin - 100 mg TID   Norco 5-325 - Short term Rx from ED visit.      report:  Reviewed and appears appropriate.     Pain Procedures: None     Imaging:   MRI LUMBAR SPINE WITHOUT CONTRAST     FINDINGS:  The examination was terminated prematurely due to claustrophobia.  Axial T1 sequences were not obtained.     ALIGNMENT: Stepwise grade 1 anterolisthesis spanning L3-S1.     BONE: No pars defect or compression fracture.  No marrow replacing lesions.     JOINT:  Multilevel degenerative changes with disc desiccation, disc height loss, and facet arthropathy.  There is mild endplate edema (Modic 1 change) at T11-12 and at each level from L3-S1.  There is subchondral bone marrow edema involving multiple facet joints bilaterally in the lower lumbar spine.     SPINAL CANAL: The conus medullaris has a normal appearance and terminates at the L1 level.  Cauda equina nerve roots are unremarkable.  No mass or collection.     PARASPINAL SOFT TISSUES: There is a 1.6 cm nodule near the right adrenal gland (9:9), which is nonspecific but unchanged in size from 07/27/2022.  Right renal cyst.  Paraspinal muscle atrophy.     SIGNIFICANT FINDINGS BY LEVEL:     T12-L1: Unremarkable.     L1-2: Unremarkable.     L2-3: Mild disc bulge.  Ligamentum flavum thickening.  No canal stenosis.  Mild bilateral foraminal stenosis.     L3-4: Disc bulge and posterior disc uncovering.  Advanced facet arthropathy and ligamentum flavum thickening.  Small bilateral facet joint effusions/synovitis, with a 1.5 cm extra canalicular synovial cyst on the right.  Mild canal stenosis.  Moderate bilateral foraminal stenosis.     L4-5: Disc bulge and posterior disc uncovering, eccentric to the right.  Advanced facet arthropathy and ligamentum flavum thickening.  Right facet joint effusion/synovitis.  Mild canal stenosis.  Encroachment on the right descending L5 nerve root in the lateral recess.  Severe right and moderate left foraminal stenosis.     L5-S1: Disc bulge and posterior disc uncovering.  Ligamentum flavum thickening.  No canal stenosis.  Mild bilateral foraminal stenosis.     Impression:     The examination was terminated prematurely due to claustrophobia.  Axial T1 sequences were not obtained.     Multilevel degenerative changes, most advanced at L3-4 and L4-5 as detailed above.     Inflammation of several facet joints in the lower lumbar spine with small effusions/synovitis and subchondral edema, likely  degenerative.    Past Medical History:   Diagnosis Date    Allergy     Angio-edema     Bronchitis     Diabetes mellitus     Hyperlipidemia     Hypertension     Pneumonia     Pneumonia     Syncope 10/3/2013     Past Surgical History:   Procedure Laterality Date    EYE SURGERY      HYSTERECTOMY      SINUS SURGERY       Social History[1]  Family History   Problem Relation Name Age of Onset    Heart failure Mother      Hypertension Mother      Heart attack Brother       Review of patient's allergies indicates:   Allergen Reactions    Anbesol [benzocaine] Swelling     Oral swelling     Amoxicillin Nausea And Vomiting     Current Outpatient Medications   Medication Sig    acetaminophen (TYLENOL) 650 MG TbSR Take 1 tablet (650 mg total) by mouth every 8 (eight) hours as needed (mouth pain).    amLODIPine (NORVASC) 10 MG tablet Take 1 tablet (10 mg total) by mouth once daily.    aspirin (ECOTRIN) 81 MG EC tablet Take 81 mg by mouth once daily.    atorvastatin (LIPITOR) 20 MG tablet Take 1 tablet (20 mg total) by mouth once daily.    calcium carbonate-vitamin D3 500 mg-3.125 mcg (125 unit) per tablet Take 1 tablet by mouth once daily.      diclofenac sodium (VOLTAREN ARTHRITIS PAIN) 1 % Gel Apply 2 g topically 2 (two) times daily as needed (pain).    FLUoxetine 20 MG capsule Take 1 capsule (20 mg total) by mouth once daily.    gabapentin (NEURONTIN) 100 MG capsule Take 1 capsule (100 mg total) by mouth 3 (three) times daily.    HYDROcodone-acetaminophen (NORCO) 5-325 mg per tablet Take 1 tablet by mouth every 6 (six) hours as needed (Moderate to severe pain).    losartan-hydrochlorothiazide 100-25 mg (HYZAAR) 100-25 mg per tablet Take 1 tablet by mouth once daily.    magic mouthwash diphen/antac/lidoc Swish and spit 5 mLs every 4 (four) hours as needed (pain from mouth sores). for mouth sores    metFORMIN (GLUCOPHAGE-XR) 500 MG ER 24hr tablet Take 1 tablet (500 mg total) by mouth 2 (two) times daily with meals.     "ondansetron (ZOFRAN-ODT) 4 MG TbDL Take 1 tablet (4 mg total) by mouth every 6 (six) hours as needed (nausea).     No current facility-administered medications for this visit.     REVIEW OF SYSTEMS:    GENERAL:  No weight loss, malaise or fevers.  HEENT:  Negative for frequent or significant headaches.  NECK:  Negative for lumps, goiter, pain and significant neck swelling.  RESPIRATORY:  Negative for cough, wheezing or shortness of breath.  CARDIOVASCULAR:  Negative for chest pain, leg swelling or palpitations.  GI:  Negative for abdominal discomfort, blood in stools or black stools or change in bowel habits.  MUSCULOSKELETAL:  See HPI.  SKIN:  Negative for lesions, rash, and itching.  PSYCH:  Negative for sleep disturbance, mood disorder and recent psychosocial stressors.  HEMATOLOGY/LYMPHOLOGY:  Negative for prolonged bleeding, bruising easily or swollen nodes.  NEURO:   No history of headaches, syncope, paralysis, seizures or tremors.  All other reviewed and negative other than HPI.    OBJECTIVE:    /65 (BP Location: Right arm, Patient Position: Sitting)   Pulse 63   Temp 98 °F (36.7 °C) (Oral)   Resp 18   Ht 5' 5" (1.651 m)   Wt 82 kg (180 lb 12.4 oz)   SpO2 100%   BMI 30.08 kg/m²     PHYSICAL EXAMINATION:    GENERAL: Well appearing, in no acute distress, alert and oriented x3.  PSYCH:  Mood and affect appropriate.  SKIN: Skin color, texture, turgor normal, no rashes or lesions.  HEAD/FACE:  Normocephalic, atraumatic. Cranial nerves grossly intact.  NECK: Limited ROM. Supple. No pain to palpation over the cervical paraspinous muscles. Spurling Negative. No pain with neck flexion, extension, or lateral rotation.   CV: RRR with palpation of the radial artery.  PULM: No evidence of respiratory difficulty, symmetric chest rise.  GI:  Soft and non-distended.  MSK: Straight leg raising is  negative to radicular pain. Pain to palpation over the facet joints of the lumbar spine. Pain with lumbar facet " loading. Positive Mailgram's. No pain over the SI joints. Sacral Thrust is negative. JASKARAN test is positive bilaterally. Gaenslen Test is negative. No pain over the GBT bilaterally.  Limited range of motion of the lumbar spine with pain reproduction.  Peripheral joint ROM is full and pain free without obvious instability or laxity in all four extremities. No obvious deformities, edema, or skin discoloration.  No atrophy or tone abnormalities are noted.   NEURO: Bilateral upper and lower extremity coordination and strength is symmetric.  No loss of sensation is noted.  MENTAL STATUS: A x O x 3, good concentration, speech is fluent and goal directed  MOTOR: 5/5 in all muscle groups  GAIT: Antalgic. Ambulates unassisted.    ASSESSMENT: 79 y.o. year old female with lower pain, consistent with      1. Lumbar spondylosis  Ambulatory Referral/Consult to Physical Therapy    Procedure Order to Pain Management      2. Facet arthritis of lumbar region  Ambulatory Referral/Consult to Physical Therapy    Procedure Order to Pain Management      3. Degeneration of intervertebral disc of lumbar region with discogenic back pain          PLAN:     Images reviewed and discussed with patient.  Schedule patient for Bilateral L3/L4 and L4/L5 facet joint injection and right L3/L4 facet synovial cyst aspiration/Rupture   Referral to physical therapy.  Instructed patient to increase gabapentin to 300 mg at night.  Can continue with 100 mg during the day.  Consider bilateral L3, L4 and L5 MBB and RFA as a future therapy.  Consider caudal epidural for spinal canal stenosis symptoms as a future therapy.  Counseled patient regarding the importance of activity modification, smoking cessation, alcohol cessation, constant sleeping habits, and physical therapy.  Follow up 2 weeks after procedure.    Jason Delgado MD PGY-5  Interventional Pain Medicine Fellow   [facility]     I spent a total of 30 minutes on the day of the  visit.  This includes face to face time and non-face to face time preparing to see the patient by reviewing previous labs/imaging, obtaining and/or reviewing separately obtained history, documenting clinical information in the electronic or other health record, independently interpreting results and communicating results to the patient/family/caregiver. This note was generated with the assistance of ambient listening technology. Verbal consent was obtained by the patient and accompanying visitor(s) for the recording of patient appointment to facilitate this note. I attest to having reviewed and edited the generated note for accuracy, though some syntax or spelling errors may persist. Please contact the author of this note for any clarification.       Johnnie Garcia        [1]   Social History  Socioeconomic History    Marital status:    Tobacco Use    Smoking status: Never     Passive exposure: Never    Smokeless tobacco: Never   Substance and Sexual Activity    Alcohol use: No    Drug use: No    Sexual activity: Not Currently     Social Drivers of Health     Financial Resource Strain: Low Risk  (3/1/2024)    Overall Financial Resource Strain (CARDIA)     Difficulty of Paying Living Expenses: Not very hard   Food Insecurity: No Food Insecurity (3/1/2024)    Hunger Vital Sign     Worried About Running Out of Food in the Last Year: Never true     Ran Out of Food in the Last Year: Never true   Transportation Needs: No Transportation Needs (3/1/2024)    PRAPARE - Transportation     Lack of Transportation (Medical): No     Lack of Transportation (Non-Medical): No   Physical Activity: Inactive (3/1/2024)    Exercise Vital Sign     Days of Exercise per Week: 0 days     Minutes of Exercise per Session: 0 min   Stress: Stress Concern Present (3/1/2024)    [employer] - Occupational Stress Questionnaire     Feeling of Stress : To some extent   Housing Stability: Low Risk  (3/1/2024)     Housing Stability Vital Sign     Unable to Pay for Housing in the Last Year: No     Number of Places Lived in the Last Year: 1     Unstable Housing in the Last Year: No

## 2025-06-20 ENCOUNTER — HOSPITAL ENCOUNTER (OUTPATIENT)
Facility: OTHER | Age: 80
Discharge: HOME | End: 2025-06-20
Attending: ANESTHESIOLOGY | Admitting: ANESTHESIOLOGY
Payer: MEDICARE

## 2025-06-20 VITALS
SYSTOLIC BLOOD PRESSURE: 182 MMHG | OXYGEN SATURATION: 96 % | DIASTOLIC BLOOD PRESSURE: 73 MMHG | TEMPERATURE: 98 F | HEIGHT: 65 IN | RESPIRATION RATE: 18 BRPM | WEIGHT: 180 LBS | HEART RATE: 71 BPM | BODY MASS INDEX: 29.99 KG/M2

## 2025-06-20 DIAGNOSIS — M47.816 LUMBAR SPONDYLOSIS: ICD-10-CM

## 2025-06-20 DIAGNOSIS — G89.29 CHRONIC PAIN: ICD-10-CM

## 2025-06-20 DIAGNOSIS — M71.38 CYST OF LUMBAR FACET JOINT: Primary | ICD-10-CM

## 2025-06-20 PROCEDURE — 25500020 PHARM REV CODE 255: Performed by: ANESTHESIOLOGY

## 2025-06-20 PROCEDURE — 64494 INJ PARAVERT F JNT L/S 2 LEV: CPT | Mod: 50,,, | Performed by: ANESTHESIOLOGY

## 2025-06-20 PROCEDURE — 64493 INJ PARAVERT F JNT L/S 1 LEV: CPT | Mod: 50 | Performed by: ANESTHESIOLOGY

## 2025-06-20 PROCEDURE — 64494 INJ PARAVERT F JNT L/S 2 LEV: CPT | Mod: 50 | Performed by: ANESTHESIOLOGY

## 2025-06-20 PROCEDURE — 64493 INJ PARAVERT F JNT L/S 1 LEV: CPT | Mod: 50,,, | Performed by: ANESTHESIOLOGY

## 2025-06-20 PROCEDURE — 63600175 PHARM REV CODE 636 W HCPCS: Performed by: ANESTHESIOLOGY

## 2025-06-20 RX ORDER — MIDAZOLAM HYDROCHLORIDE 1 MG/ML
INJECTION INTRAMUSCULAR; INTRAVENOUS
Status: DISCONTINUED | OUTPATIENT
Start: 2025-06-20 | End: 2025-06-20 | Stop reason: HOSPADM

## 2025-06-20 RX ORDER — FENTANYL CITRATE 50 UG/ML
INJECTION, SOLUTION INTRAMUSCULAR; INTRAVENOUS
Status: DISCONTINUED | OUTPATIENT
Start: 2025-06-20 | End: 2025-06-20 | Stop reason: HOSPADM

## 2025-06-20 RX ORDER — SODIUM CHLORIDE 9 MG/ML
INJECTION, SOLUTION INTRAVENOUS CONTINUOUS
Status: DISCONTINUED | OUTPATIENT
Start: 2025-06-20 | End: 2025-06-20 | Stop reason: HOSPADM

## 2025-06-20 RX ORDER — LIDOCAINE HYDROCHLORIDE 20 MG/ML
INJECTION, SOLUTION INFILTRATION; PERINEURAL
Status: DISCONTINUED | OUTPATIENT
Start: 2025-06-20 | End: 2025-06-20 | Stop reason: HOSPADM

## 2025-06-20 RX ORDER — DEXAMETHASONE SODIUM PHOSPHATE 10 MG/ML
INJECTION, SOLUTION INTRA-ARTICULAR; INTRALESIONAL; INTRAMUSCULAR; INTRAVENOUS; SOFT TISSUE
Status: DISCONTINUED | OUTPATIENT
Start: 2025-06-20 | End: 2025-06-20 | Stop reason: HOSPADM

## 2025-06-20 NOTE — DISCHARGE INSTRUCTIONS

## 2025-06-20 NOTE — OP NOTE
Therapeutic Lumbar Facet Joint Injection under Fluoroscopy     The procedure, risks, benefits, and options were discussed with the patient. There are no contraindications to the procedure. The patent expressed understanding and agreed to the procedure. Informed written consent was obtained prior to the start of the procedure and can be found in the patient's chart.    PATIENT NAME: Lesly Jacob   MRN: [id number]     DATE OF PROCEDURE: 06/20/2025    PROCEDURE: Bilateral L3/4 and L4/5 Lumbar Facet Joint Injection under Fluoroscopy      PRE-OP DIAGNOSIS: Lumbar spondylosis [M47.816] Lumbar spondylosis [M47.816]    POST-OP DIAGNOSIS: Same    PHYSICIAN: Johnnie Garcia MD    ASSISTANTS:  Mihai Avendano Pain Fellow    MEDICATIONS INJECTED: Preservative-free Decadron 10mg with 4cc of Bupivacaine 0.25%    LOCAL ANESTHETIC INJECTED: Xylocaine 2%     SEDATION: Versed 1mg and Fentanyl 50mcg                                                                                                                                                                                     Conscious sedation ordered by M.D. Patient re-evaluation prior to administration of conscious sedation. No changes noted in patient's status from initial evaluation. The patient's vital signs were monitored by RN and patient remained hemodynamically stable throughout the procedure.    Event Time In   Sedation Start 1516   Sedation End 1523       ESTIMATED BLOOD LOSS: None    COMPLICATIONS: None    TECHNIQUE: Time-out was performed to identify the patient and procedure to be performed. With the patient laying in a prone position, the surgical area was prepped and draped in the usual sterile fashion using ChloraPrep and a fenestrated drape. The level was determined under fluoroscopy guidance. Skin anesthesia was achieved by injecting Lidocaine 2% over the injection sites. A 22 gauge, 5 inch needle was introduced to each level using AP, lateral and/or contralateral  oblique fluoroscopic imaging. Contrast dye  Omnipaque (300mg/mL) was given until dye spread was in the distribution of the facet joint without any intravascular spread.  After negative aspiration for blood or CSF was confirmed, 1 mL of the medication mixture listed above was injected slowly into each joint with displacement of the contrast confirming that the medication went into the distribution of the facet joints. The needles were removed and bleeding was nil. A sterile dressing was applied. No specimens collected. The patient tolerated the procedure well.    A small right sided L 3/4 facet joint cyst was aspirated prior to facet joint injection at this site.    The patient was monitored after the procedure in the recovery area. They were given post-procedure and discharge instructions to follow at home. The patient was discharged in a stable condition.    Frank Wilcox MD     I reviewed and edited the fellow's note. I conducted my own interview and physical examination. I agree with the findings. I was present and supervising all critical portions of the procedure.     Johnnie Garcia MD

## 2025-06-20 NOTE — DISCHARGE SUMMARY
Discharge Note  Short Stay      SUMMARY     Admit Date: 6/20/2025    Attending Physician: Frank Wilcox      Discharge Physician: Frank Wilcox      Discharge Date: 6/20/2025 3:15 PM    Procedure(s) (LRB):  FACET JOINT INJECTION BILATERAL L3/4 AND L4/5 WITH RIGHT L3/L4 CYST ASPIRATION/RUPTURE (Bilateral)    Final Diagnosis: Lumbar spondylosis [M47.816]    Disposition: Home or self care    Patient Instructions:   Current Discharge Medication List        CONTINUE these medications which have NOT CHANGED    Details   acetaminophen (TYLENOL) 650 MG TbSR Take 1 tablet (650 mg total) by mouth every 8 (eight) hours as needed (mouth pain).  Qty: 20 tablet, Refills: 0      amLODIPine (NORVASC) 10 MG tablet Take 1 tablet (10 mg total) by mouth once daily.  Qty: 90 tablet, Refills: 3    Comments: Place refills on file, fill when appropriate  Associated Diagnoses: Primary hypertension      aspirin (ECOTRIN) 81 MG EC tablet Take 81 mg by mouth once daily.      atorvastatin (LIPITOR) 20 MG tablet Take 1 tablet (20 mg total) by mouth once daily.  Qty: 90 tablet, Refills: 3    Comments: Place refills on file, fill when appropriate  Associated Diagnoses: Mixed hyperlipidemia      calcium carbonate-vitamin D3 500 mg-3.125 mcg (125 unit) per tablet Take 1 tablet by mouth once daily.        diclofenac sodium (VOLTAREN ARTHRITIS PAIN) 1 % Gel Apply 2 g topically 2 (two) times daily as needed (pain).  Qty: 50 g, Refills: 0      FLUoxetine 20 MG capsule Take 1 capsule (20 mg total) by mouth once daily.  Qty: 90 capsule, Refills: 3    Comments: Place refills on file, fill when appropriate  Associated Diagnoses: Anxiety      !! gabapentin (NEURONTIN) 100 MG capsule Take 1 capsule (100 mg total) by mouth 3 (three) times daily.  Qty: 90 capsule, Refills: 11    Associated Diagnoses: Chronic bilateral low back pain without sciatica; Spondylolisthesis of lumbar region      !! gabapentin (NEURONTIN) 300 MG capsule Take 1 capsule (300 mg  total) by mouth nightly.  Qty: 30 capsule, Refills: 11      HYDROcodone-acetaminophen (NORCO) 5-325 mg per tablet Take 1 tablet by mouth every 6 (six) hours as needed (Moderate to severe pain).  Qty: 12 tablet, Refills: 0    Associated Diagnoses: Aphthous ulcer of tongue      losartan-hydrochlorothiazide 100-25 mg (HYZAAR) 100-25 mg per tablet Take 1 tablet by mouth once daily.  Qty: 90 tablet, Refills: 3    Comments: Place refills on file, fill when appropriate  Associated Diagnoses: Primary hypertension      magic mouthwash diphen/antac/lidoc Swish and spit 5 mLs every 4 (four) hours as needed (pain from mouth sores). for mouth sores  Qty: 100 mL, Refills: 0      metFORMIN (GLUCOPHAGE-XR) 500 MG ER 24hr tablet Take 1 tablet (500 mg total) by mouth 2 (two) times daily with meals.  Qty: 180 tablet, Refills: 3    Comments: Change to metformin ER d/t GI issues  Associated Diagnoses: Type 2 diabetes mellitus without complication, without long-term current use of insulin      ondansetron (ZOFRAN-ODT) 4 MG TbDL Take 1 tablet (4 mg total) by mouth every 6 (six) hours as needed (nausea).  Qty: 10 tablet, Refills: 0       !! - Potential duplicate medications found. Please discuss with provider.              Discharge Diagnosis: Lumbar spondylosis [M47.816]  Condition on Discharge: Stable with no complications to procedure   Diet on Discharge: Same as before.  Activity: as per instruction sheet.  Discharge to: Home with a responsible adult.  Follow up: 2-4 weeks       Please call my office or pager at [phone] if experienced any weakness or loss of sensation, fever > 101.5, pain uncontrolled with oral medications, persistent nausea/vomiting/or diarrhea, redness or drainage from the incisions, or any other worrisome concerns. If physician on call was not reached or could not communicate with our office for any reason please go to the nearest emergency department

## 2025-06-23 LAB — POCT GLUCOSE: 77 MG/DL (ref 70–110)

## 2025-06-27 ENCOUNTER — HOSPITAL ENCOUNTER (EMERGENCY)
Facility: HOSPITAL | Age: 80
Discharge: HOME | End: 2025-06-27
Attending: STUDENT IN AN ORGANIZED HEALTH CARE EDUCATION/TRAINING PROGRAM
Payer: MEDICARE

## 2025-06-27 VITALS
HEIGHT: 64 IN | DIASTOLIC BLOOD PRESSURE: 107 MMHG | RESPIRATION RATE: 18 BRPM | OXYGEN SATURATION: 97 % | BODY MASS INDEX: 30.73 KG/M2 | TEMPERATURE: 98 F | HEART RATE: 77 BPM | SYSTOLIC BLOOD PRESSURE: 147 MMHG | WEIGHT: 180 LBS

## 2025-06-27 DIAGNOSIS — R21 RASH: Primary | ICD-10-CM

## 2025-06-27 PROCEDURE — 99284 EMERGENCY DEPT VISIT MOD MDM: CPT

## 2025-06-27 PROCEDURE — 25000003 PHARM REV CODE 250: Performed by: PHYSICIAN ASSISTANT

## 2025-06-27 PROCEDURE — 25000003 PHARM REV CODE 250: Performed by: EMERGENCY MEDICINE

## 2025-06-27 RX ORDER — FLUTICASONE PROPIONATE 50 MCG
1 SPRAY, SUSPENSION (ML) NASAL 2 TIMES DAILY
COMMUNITY
Start: 2025-06-09

## 2025-06-27 RX ORDER — HYDROXYZINE PAMOATE 25 MG/1
25 CAPSULE ORAL EVERY 8 HOURS PRN
Qty: 21 CAPSULE | Refills: 0 | Status: SHIPPED | OUTPATIENT
Start: 2025-06-27 | End: 2025-07-04

## 2025-06-27 RX ORDER — CEPHALEXIN 500 MG/1
500 CAPSULE ORAL
Status: COMPLETED | OUTPATIENT
Start: 2025-06-27 | End: 2025-06-27

## 2025-06-27 RX ORDER — METOPROLOL TARTRATE 25 MG/1
25 TABLET, FILM COATED ORAL 2 TIMES DAILY
COMMUNITY
Start: 2025-05-05

## 2025-06-27 RX ORDER — DIPHENHYDRAMINE HCL 25 MG
25 CAPSULE ORAL
Status: COMPLETED | OUTPATIENT
Start: 2025-06-27 | End: 2025-06-27

## 2025-06-27 RX ORDER — CEPHALEXIN 500 MG/1
500 CAPSULE ORAL 4 TIMES DAILY
Qty: 20 CAPSULE | Refills: 0 | Status: SHIPPED | OUTPATIENT
Start: 2025-06-27 | End: 2025-07-02

## 2025-06-27 RX ADMIN — DIPHENHYDRAMINE HYDROCHLORIDE 25 MG: 25 CAPSULE ORAL at 06:06

## 2025-06-27 RX ADMIN — CEPHALEXIN 500 MG: 500 CAPSULE ORAL at 06:06

## 2025-06-27 NOTE — ED PROVIDER NOTES
Encounter Date: 6/27/2025       History     Chief Complaint   Patient presents with    Rash     Lower back  rash and itching     79-year-old female with past medical history of diabetes, hypertension, hyperlipidemia presents ED for rash.  Noticed a rash with itchiness to her lower back.  States she did have facet joint injections a week ago afterwards noticed it feels slightly itchy but worsened last night.  Denies any fevers/chills.        Review of patient's allergies indicates:   Allergen Reactions    Anbesol [benzocaine] Swelling     Oral swelling     Amoxicillin Nausea And Vomiting     Past Medical History:   Diagnosis Date    Allergy     Angio-edema     Bronchitis     Diabetes mellitus     Hyperlipidemia     Hypertension     Pneumonia     Pneumonia     Syncope 10/3/2013     Past Surgical History:   Procedure Laterality Date    EYE SURGERY      HYSTERECTOMY      INJECTION OF FACET JOINT Bilateral 6/20/2025    Procedure: FACET JOINT INJECTION BILATERAL L3/4 AND L4/5 WITH RIGHT L3/L4 CYST ASPIRATION/RUPTURE;  Surgeon: Johnnie Garcia MD;  Location: [facility];  Service: Pain Management;  Laterality: Bilateral;  2 WK F/U T[facility]ONE    SINUS SURGERY       Family History   Problem Relation Name Age of Onset    Heart failure Mother      Hypertension Mother      Heart attack Brother       Social History[1]  Review of Systems    Physical Exam     Initial Vitals [06/27/25 1830]   BP Pulse Resp Temp SpO2   (!) 147/107 77 18 98 °F (36.7 °C) 97 %      MAP       --         Physical Exam    Nursing note and vitals reviewed.  Constitutional: She appears well-developed and well-nourished.   HENT:   Head: Normocephalic and atraumatic.   Eyes: Conjunctivae are normal. Pupils are equal, round, and reactive to light.   Neck: Neck supple.   Normal range of motion.  Cardiovascular:  Normal rate.           Pulmonary/Chest: Breath sounds normal.   Abdominal: Abdomen is soft. There is no abdominal tenderness.   Musculoskeletal:          General: Normal range of motion.      Cervical back: Normal range of motion and neck supple.     Neurological: She is alert and oriented to person, place, and time. GCS score is 15. GCS eye subscore is 4. GCS verbal subscore is 5. GCS motor subscore is 6.   Skin:   There is a rash to the lower back with excoriations in scattered skin color papules.  No erythema, fluctuance.         ED Course   Procedures  Labs Reviewed - No data to display       Imaging Results    None          Medications   diphenhydrAMINE capsule 25 mg (has no administration in time range)   cephALEXin capsule 500 mg (has no administration in time range)     Medical Decision Making  79-year-old female presents ED with rash to lower back.  Recent joint injection.  On my exam has rash with excoriations concerning for dermatitis.  Given her recent instrumentation will cover for developing cellulitis.  No drainable abscess.  No mucous school involvement or recent antibiotic use and low suspicion for SJS.  Will discharge with Keflex and Vistaril for itching and outpatient referral for Dermatology as needed.  Counseled on strict return ED precautions.    Risk  Prescription drug management.                                      Clinical Impression:  Final diagnoses:  [R21] Rash (Primary)          ED Disposition Condition    Discharge Stable          ED Prescriptions       Medication Sig Dispense Start Date End Date Auth. Provider    hydrOXYzine pamoate (VISTARIL) 25 MG Cap Take 1 capsule (25 mg total) by mouth every 8 (eight) hours as needed (Itching). 21 capsule 6/27/2025 7/4/2025 Rm Haq PA-C    cephALEXin (KEFLEX) 500 MG capsule Take 1 capsule (500 mg total) by mouth 4 (four) times daily. for 5 days 20 capsule 6/27/2025 7/2/2025 Rm Haq PA-C          Follow-up Information    None                [1]   Social History  Tobacco Use    Smoking status: Never     Passive exposure: Never    Smokeless tobacco: Never   Substance Use Topics    Alcohol  use: No    Drug use: No        Rm Haq PA-C  06/27/25 0601

## 2025-06-27 NOTE — DISCHARGE INSTRUCTIONS
You were seen in the ED for a rash.  It appears to be inflammation to the skin to her lower back I have prescribed you an antibiotic due to concerns for developing infection as well as medications to help with itching.  Please use the medication with caution as it may make you drowsy.  If you continue to have symptoms please follow-up with dermatology and I have placed a referral.  If you develop any new or worsening symptoms please return to ED sooner.

## 2025-06-27 NOTE — ED TRIAGE NOTES
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 Rash reported to low back  MUSCULOSKELETAL: Patient moving all extremities spontaneously, no obvious swelling or deformities noted. Ambulates independently.  RESPIRATORY: Denies shortness of breath.Respirations unlabored.   CARDIAC: Denies CP, 2+ distal pulses; no peripheral edema  ABDOMEN: S/ND/NT, Denies nausea  : voids spontaneously, denies difficulty  Neurologic: AAO x 4; follows commands equal strength in all extremities; denies numbness/tingling. Denies dizziness       Had 4 injections in her back 2 weeks ago. Was having some light itching over the past couple of weeks, then yesterday, she started to have more severe itching and rash.

## 2025-06-27 NOTE — FIRST PROVIDER EVALUATION
"Medical screening examination initiated.  I have conducted a focused provider triage encounter, findings are as follows:    Brief history of present illness: Rash in back and itchy.     Back injection 2 weeks ago.     Was driving here (aka is not driving)    Vitals:    06/27/25 1830   BP: (!) 147/107   Pulse: 77   Resp: 18   Temp: 98 °F (36.7 °C)   TempSrc: Oral   SpO2: 97%   Weight: 81.6 kg (180 lb)   Height: 5' 4" (1.626 m)       Pertinent physical exam:  NAD, well appearing, interacting normally. Back with macular/papular rash with excoriations and small areas of induration at the site of injections. No tenderness.    Brief workup plan:  Benadryl.     Preliminary workup initiated; this workup will be continued and followed by the physician or advanced practice provider that is assigned to the patient when roomed.  "

## 2025-07-07 ENCOUNTER — CLINICAL SUPPORT (OUTPATIENT)
Dept: REHABILITATION | Facility: OTHER | Age: 80
End: 2025-07-07
Attending: ANESTHESIOLOGY
Payer: MEDICARE

## 2025-07-07 DIAGNOSIS — M47.816 LUMBAR SPONDYLOSIS: Primary | ICD-10-CM

## 2025-07-07 DIAGNOSIS — M53.86 DECREASED RANGE OF MOTION OF INTERVERTEBRAL DISCS OF LUMBAR SPINE: ICD-10-CM

## 2025-07-07 DIAGNOSIS — G89.29 CHRONIC BILATERAL LOW BACK PAIN WITH BILATERAL SCIATICA: ICD-10-CM

## 2025-07-07 DIAGNOSIS — M47.816 FACET ARTHRITIS OF LUMBAR REGION: ICD-10-CM

## 2025-07-07 DIAGNOSIS — M54.41 CHRONIC BILATERAL LOW BACK PAIN WITH BILATERAL SCIATICA: ICD-10-CM

## 2025-07-07 DIAGNOSIS — M54.42 CHRONIC BILATERAL LOW BACK PAIN WITH BILATERAL SCIATICA: ICD-10-CM

## 2025-07-07 DIAGNOSIS — R29.898 DECREASED STRENGTH OF TRUNK AND BACK: ICD-10-CM

## 2025-07-07 PROCEDURE — 97112 NEUROMUSCULAR REEDUCATION: CPT

## 2025-07-07 PROCEDURE — 97161 PT EVAL LOW COMPLEX 20 MIN: CPT

## 2025-07-07 NOTE — PROGRESS NOTES
Outpatient Rehab    Physical Therapy Evaluation    Patient Name: Lesly Jacob  MRN: [id number]  YOB: 1945  Encounter Date: 7/7/2025    Therapy Diagnosis:   Encounter Diagnoses   Name Primary?    Lumbar spondylosis     Facet arthritis of lumbar region     Decreased range of motion of intervertebral discs of lumbar spine Yes    Chronic bilateral low back pain with bilateral sciatica     Decreased strength of trunk and back      Physician: Johnnie Garcia MD    Physician Orders: Eval and Treat  Medical Diagnosis: Lumbar spondylosis  Facet arthritis of lumbar region  Surgical Diagnosis: Not applicable for this Episode   Surgical Date: Not applicable for this Episode  Days Since Last Surgery: Not applicable for this Episode    Visit # / Visits Authorized:  1 / 1  Insurance Authorization Period: 6/10/2025 to 12/31/2025  Date of Evaluation: 7/7/2025  Plan of Care Certification: 7/7/2025 to 9/7/2025     Time In: 0903   Time Out: 1001  Total Time (in minutes): 58   Total Billable Time (in minutes): 58    Intake Outcome Measure for FOTO Survey    Therapist reviewed FOTO scores for Lesly Jacob on 7/7/2025.   FOTO report - see Media section or FOTO account episode details.     Intake Score: 31%    Precautions:       Subjective   History of Present Illness  Lesly is a 79 y.o. female      The patient reports a medical diagnosis of M47.816 (ICD-10-CM) - Lumbar spondylosis.    Diagnostic tests related to this condition: MRI studies.   MRI Studies Details: 5/15/25: The examination was terminated prematurely due to claustrophobia.  Axial T1 sequences were not obtained.     Multilevel degenerative changes, most advanced at L3-4 and L4-5 as detailed above.     Inflammation of several facet joints in the lower lumbar spine with small effusions/synovitis and subchondral edema, likely degenerative.    History of Present Condition/Illness: Patient complains of bilateral low back pain that travels down  bilateral buttock regions, past the knees and into bilateral posterior lower legs. Her low back pain began about 5 years ago with an insideous onset. She had a facet injection on Michelle 10 th, 2025 which did not provide relief beyond the first few days, but she had a skin reaction which lead to her going to the emergency department last week. She was provided Benadryl and antibiotics which have not helped. Patient reports that her low back pain is aggravated by walking further than 1 block, sitting after standing for prolong periods, standing for prolong periods, lifting legs up from laying down position, forward bending, and carrying laundry.    Activities of Daily Living  Social history was obtained from Patient.          Patient Roles: Caregiver for adult  Patient Responsibilities: Community mobility, Driving, Financial management, Health management, Home management, Laundry, Meal prep, Shopping, Personal ADL    Previously independent with activities of daily living? Yes     Currently independent with activities of daily living? Yes          Previously independent with instrumental activities of daily living? Yes     Currently independent with instrumental activities of daily living? Yes              Pain      Pain at best is reported as 6/10. Pain at worst is reported as 10/10.   Location: Bilateral low back with radiating into posterior upper/lower legs  Clinical Progression (since onset): Stable  Pain Qualities: Radiating, Aching, Tightness  Pain-Relieving Factors: Medications - prescription  Pain-Aggravating Factors: Bending, Lifting, Walking, Standing, Squatting, Transfers, Cooking         Review of Systems  Patient reports: Diabetes and Osteoarthritis  Patient denies: Bladder Incontinence, Bowel Incontinence, Chest Pain, Dizziness, Fainting, Fever, Headache, Lower Extremity Neurological Deficits, Nausea, Night Pain, Saddle Numbness, Shortness of Breath, Weight Gain, Weight Loss, Cancer History, Cardiac  History, Gallbladder History, Immunosuppression History, Kidney History, Recent Infection History, Rheumatoid Arthritis, Stomach History, Trauma History, and Ulcer History  Additional Red Flag Details:  Diabetes mellitus, Hyperlipidemia, Hypertension      Living Arrangements  Living Situation  Housing: Home independently  Living Arrangements: Children  Support Systems: Family members    Home Setup  Type of Structure: House  Home Access: Stairs with rails  Entrance Stairs - Number of Steps: 3  Entrance Stairs - Rails: Both  Number of Levels in Home: One level  Primary Bedroom: 1st floor  Primary Bathroom: 1st floor  Bathroom Toilet: Standard  Kitchen: 1st floor  Laundry: 1st floor        Employment  Patient does not report that: Does the patient's condition impact their ability to work?  Employment Status: Not employed          Past Medical History/Physical Systems Review:   Lesly Jacob  has a past medical history of Allergy, Angio-edema, Bronchitis, Diabetes mellitus, Hyperlipidemia, Hypertension, Pneumonia, Pneumonia, and Syncope.    Lesly Jacob  has a past surgical history that includes Hysterectomy; Sinus surgery; Eye surgery; and Injection of facet joint (Bilateral, 6/20/2025).    Lesly has a current medication list which includes the following prescription(s): acetaminophen, amlodipine, aspirin, atorvastatin, calcium carbonate-vitamin d3, diclofenac sodium, fluoxetine, fluticasone propionate, gabapentin, gabapentin, hydrocodone-acetaminophen, losartan-hydrochlorothiazide 100-25 mg, magic mouthwash diphen/antac/lidoc, metformin, metoprolol tartrate, and ondansetron.    Review of patient's allergies indicates:   Allergen Reactions    Anbesol [benzocaine] Swelling     Oral swelling     Amoxicillin Nausea And Vomiting        Objective   Posture  Patient presents with a Forward head position. Increased thoracic kyphosis and Increased lumbar lordosis is observed.   Shoulders are Rounded and  Asymmetric. Right scapula is: Elevated  Left pelvis characteristics: Anterior Superior Iliac Spine Higher and Posterior Superior Iliac Spine Higher         Right hip is: ABducted  Right knee position is: Genu Valgus  Right ankle/foot exhibits: Foot Pes Cavus       Lower Extremity Sensation  Right Lumbar/Lower Extremity Sensation  Intact: Light Touch and Static Two Point Discrimination       Left Lumbar/Lower Extremity Sensation  Intact: Light Touch and Static Two Point Discrimination                Right Lower Extremity Reflexes  Patellar, L4: Normal (2+)         Achilles, S1: Normal (2+)         Left Lower Extremity Reflexes  Patellar, L4: Normal (2+)          Achilles, S1: Normal (2+)             Spinal Mobility  Hypomobile: Cervical, Thoracic, and Lumbosacral       Spinal Muscle Palpation  Right Spinal Muscle Palpation  Abnormal: Lumbar/Sacral          Left Spinal Muscle Palpation  Abnormal: Lumbar/Sacral          Vertebral Palpation  Vertebral Structures Palpated  Lumbar: Right Transverse Process, Left Transverse Process, Right Facet Joint, and Left Facet Joint  Lumbar Spine Structures Palpated  Upper Lumbar Spine: Right Transverse Process, Left Transverse Process, Right Facet Joint, and Left Facet Joint  Middle Lumbar Spine: Right Transverse Process, Left Transverse Process, and Right Facet Joint  Lower Lumbar Spine: Right Transverse Process, Left Transverse Process, and Right Facet Joint       TTLP bilateral lumbar paraspinals, iliac crests, quadratus lumborums, gluteus medius/maurizio, bilateral sacroiliac joint lines; 4 dark markings around L4-L5 at recent facet injection sites    Hip Palpation  Right Hip Palpation  Abnormal: Lumbar/Sacral Muscle          Left Hip Palpation  Abnormal: Lumbar/Sacral Muscle               Lumbar Range of Motion   Active (deg) Passive (deg) Pain   Flexion 90 95 Yes   Extension 25 30 Yes   Right Lateral Flexion 20 25 Yes   Right Rotation 15 20 Yes   Left Lateral Flexion 15 20 Yes    Left Rotation 15 20 Yes                 Thoracic Trunk Strength  3+    Lumbar Strength   Strength Pain   Flexion 3+     Extension 3+ Yes   Right Lateral Flexion 3+ Yes   Left Lateral Flexion 3+ Yes   Right Rotation 3+ Yes   Left Rotation 3+ Yes   Transverse Abdominus Strength Testing: 3+            Hip Strength - Planes of Motion   Right Strength Right Pain Left Strength Left  Pain   Flexion (L2) 4- Yes 4- Yes   Extension 4-   4-     ABduction 4-   4-     ADduction 4-   4-     Internal Rotation 3+ Yes 3+ Yes   External Rotation 3+ Yes 3+ Yes       Knee Strength   Right Strength Right Pain Left Strength Left  Pain   Flexion (S2) 4-   4- Yes   Prone Flexion           Extension (L3) 4-   4-            Ankle/Foot Strength - Planes of Motion   Right Strength Right Pain Left Strength Left  Pain   Dorsiflexion (L4) 4-   4-     Plantar Flexion (S1) 4-   4-     Inversion           Eversion           Great Toe Flexion           Great Toe Extension (L5)           Lesser Toes Flexion           Lesser Toes Extension                  Nystagmus and Associated Symptom Provocative Tests  Negative: Valsalva           Cervical/Thoracic Special Tests  Thoracic Tests  Positive: Slump  Negative: Repeated Flexion, Repeated Extension, Right Repeated Lateral Bending, Right Repeated Rotation, Left Repeated Lateral Bending, and Left Repeated Rotation         Lumbar/Pelvic Girdle Special Tests  Lumbar Tests - Repeated  Positive: Flexion, Extension, Right Side Glide, and Left Side Glide       Lumbar Tests - SLR and Tension  Positive: Left Crossed Straight Leg Raise  Negative: Right Passive Straight Leg Raise, Left Passive Straight Leg Raise, and Right Crossed Straight Leg Raise       Other Lumbar Tests  Positive: Right Quadrant and Left Quadrant  Negative: Lumbar Vertical Compression and Valsalva            Pelvic Girdle / Sacrum Tests  Positive: Right Sacroiliac Compression and Left Sacroiliac Compression  Negative: Right Gillet's and Left  "Gillet's         Hip Special Tests  Sacroiliac Joint Tests  Positive: Right Compression and Left Compression  Negative: Right Gillet's and Left Gillet's               Gait Analysis  Base of Support: Wide  Gait Pattern: Antalgic, Waddling  Walking Speed: Decreased    Right Side Walking Observations  Decreased: Stance Time and Swing Time       Left Side Walking Observations  Decreased: Stance Time and Swing Time     Trunk Observations During Gait: Left lateral lean over stance limb and Forward lean    Hip Observations During Gait  Bilateral: Decreased Hip Extension  Knee Observations During Gait  Bilateral: Decreased Knee Extension in Initial Contact and Knee Decreased Extension in Midstance  Ankle/Foot Observations During Gait  Bilateral: Ankle Delayed Push Off         Treatment:  Balance/Neuromuscular Re-Education  NMR 1: PPT tactile feedback hold 5" x 20  NMR 2: LTR x 20  NMR 3: EIL forearms hold 5" x 20  NMR 4: Supine YTB clamshells x 20  NMR 5: + NV: Bridges, SOC, EIS, figure-4 stretch, STS    Time Entry(in minutes):  PT Evaluation (Low) Time Entry: 42  Neuromuscular Re-Education Time Entry: 16    Assessment & Plan   Assessment  Lesly presents with a condition of Low complexity.   Presentation of Symptoms: Stable  Will Comorbidities Impact Care: No       Functional Limitations: Activity tolerance, Ambulating on uneven surfaces, Carrying objects, Decreased ambulation distance/endurance, Functional mobility, Gait limitations, Increased risk of fall, Pain with ADLs/IADLs, Painful locomotion/ambulation, Performing household chores, Proprioception, Range of motion, Standing tolerance, Transfers, Squatting  Impairments: Impaired physical strength, Lack of appropriate home exercise program, Pain with functional activity, Abnormal or restricted range of motion, Activity intolerance, Abnormal coordination, Abnormal gait, Abnormal muscle tone, Abnormal muscle firing    Patient Goal for Therapy (PT): "Be able to walk around " "the block."  Prognosis: Good  Assessment Details: Patient is an 79 year old female who complains of bilateral low back pain with radiating into bilateral posterior upper/lower legs that began about 5 years ago with an insidious onset of pain. The patient received facet injections at four sites amongst lumbar spine last week and had a skin reaction that resulted in her going to the emergency department. Patient prescribed medication, but still experiencing integumentary discoloration and itching. Therefore advised to follow-up with provider immediately. Physical examination today reveals: tenderness to palpation of bilateral lumbar paraspinals, iliac crests, quadratus lumborums, gluteus medius/maurizio, bilateral sacroiliac joint lines, lumbar facets and transverse processes; 4 dark markings around L4-L5 at recent facet injection site grossly limited and painful lumbar active range of motion; decreased gross lower quarter strength with pain provocation during bilateral hip internal/external rotation; grossly hypomobile thoracolumbar joint mobility; an abnormal and antalgic gait pattern; and positive bilateral Slump, Repeated Lumbar Extension/Rotation and bilateral Side Bending tests. These impairments are affecting the patient's ability to perform functional transfers, prolong standing, community ambulation, carrying heavy objects, and forward bending which have led to participation limitations in general IADLs, household chores and community participation. Signs and symptoms are most consistent with lumbar spondylosis. Given the severity of the patient's low back, her past medical history, and overall health for her age, a full recovery is expected within 8-10 weeks. Her rehab potential is dependent on compliance with PT recommendations and adherence to her home exercise program. Skilled PT intervention is required to address these key impairments and to provide and progress with an appropriate home exercise program. " This evaluation is of low complexity due to the stable nature of the patient's presentation as well as the comorbidities and medical factors included in this evaluation. The patient has been educated in the evaluation findings, prognosis, and plan of care, HEP and is in agreement and willing to participate in therapy.      Plan  From a physical therapy perspective, the patient would benefit from: Skilled Rehab Services    Planned therapy interventions include: Therapeutic exercise, Therapeutic activities, Manual therapy, and Neuromuscular re-education.    Planned modalities to include: Electrical stimulation - attended, Diathermy, and Cryotherapy (cold pack).        Visit Frequency: 2 times Per Week for 10 Weeks.       This plan was discussed with Patient.   Discussion participants: Agreed Upon Plan of Care             The patient's spiritual, cultural, and educational needs were considered, and the patient is agreeable to the plan of care and goals.           Goals:   Active       Long-term goals       Pt to improve gross lumbar range of motion by 90% to allow for improved functional mobility to allow for improvement in IADL's.        Start:  07/07/25    Expected End:  09/01/25            Increased gross hip internal/external rotation and transverse abdominus MMT 1 to increase tolerance for ADL and work activities.        Start:  07/07/25    Expected End:  09/01/25            Pt will scores report a 46% or greater score on FOTO hip survey  to demonstrate increase in LE function with every day tasks.       Start:  07/07/25    Expected End:  09/01/25            Pt to be Independent with HEP to improve ROM and independence with ADL's.       Start:  07/07/25    Expected End:  09/01/25            Patient will be able to walk at least 1 block with no greater than 3/10 low back pain in order to improve tolerance to community ambulation.       Start:  07/07/25    Expected End:  09/01/25               Short-term goals        Report decreased in pain at worse less than  <  / =  7/10  to increase tolerance for functional activities.       Start:  07/07/25    Expected End:  08/04/25            Pt to improve gross lumbar range of motion by 50% to allow for improved functional mobility to allow for improvement in IADL's.       Start:  07/07/25    Expected End:  08/04/25            Increased gross hip internal/external rotation and transverse abdominus MMT 1/2  to increase tolerance for ADL and work activities.        Start:  07/07/25    Expected End:  08/04/25            Patient will be able to tolerate lift at least a 10 lb. object from knee height to chest height and carry 50 ft. with no greater than 3/10 low back pain.       Start:  07/07/25    Expected End:  08/04/25            Pt to tolerate HEP to improve ROM and independence with ADL's.       Start:  07/07/25    Expected End:  08/04/25                Gbaby Warren, PT

## 2025-07-08 ENCOUNTER — LAB VISIT (OUTPATIENT)
Dept: LAB | Facility: HOSPITAL | Age: 80
End: 2025-07-08
Attending: INTERNAL MEDICINE
Payer: MEDICARE

## 2025-07-08 ENCOUNTER — OFFICE VISIT (OUTPATIENT)
Dept: INTERNAL MEDICINE | Facility: CLINIC | Age: 80
End: 2025-07-08
Payer: MEDICARE

## 2025-07-08 VITALS
RESPIRATION RATE: 20 BRPM | OXYGEN SATURATION: 97 % | BODY MASS INDEX: 32.7 KG/M2 | SYSTOLIC BLOOD PRESSURE: 136 MMHG | HEART RATE: 81 BPM | WEIGHT: 190.5 LBS | DIASTOLIC BLOOD PRESSURE: 60 MMHG

## 2025-07-08 DIAGNOSIS — L03.312 CELLULITIS OF LOWER BACK: ICD-10-CM

## 2025-07-08 DIAGNOSIS — M54.50 CHRONIC BILATERAL LOW BACK PAIN WITHOUT SCIATICA: ICD-10-CM

## 2025-07-08 DIAGNOSIS — I10 PRIMARY HYPERTENSION: ICD-10-CM

## 2025-07-08 DIAGNOSIS — L03.312 CELLULITIS OF LOWER BACK: Primary | ICD-10-CM

## 2025-07-08 DIAGNOSIS — G89.29 CHRONIC BILATERAL LOW BACK PAIN WITHOUT SCIATICA: ICD-10-CM

## 2025-07-08 LAB
ABSOLUTE EOSINOPHIL (OHS): 0.68 K/UL
ABSOLUTE MONOCYTE (OHS): 0.79 K/UL (ref 0.3–1)
ABSOLUTE NEUTROPHIL COUNT (OHS): 4.29 K/UL (ref 1.8–7.7)
ALBUMIN SERPL BCP-MCNC: 3.8 G/DL (ref 3.5–5.2)
ALP SERPL-CCNC: 71 UNIT/L (ref 40–150)
ALT SERPL W/O P-5'-P-CCNC: 13 UNIT/L (ref 10–44)
ANION GAP (OHS): 7 MMOL/L (ref 8–16)
AST SERPL-CCNC: 15 UNIT/L (ref 11–45)
BASOPHILS # BLD AUTO: 0.1 K/UL
BASOPHILS NFR BLD AUTO: 1.2 %
BILIRUB SERPL-MCNC: 0.4 MG/DL (ref 0.1–1)
BUN SERPL-MCNC: 14 MG/DL (ref 8–23)
CALCIUM SERPL-MCNC: 9 MG/DL (ref 8.7–10.5)
CHLORIDE SERPL-SCNC: 107 MMOL/L (ref 95–110)
CO2 SERPL-SCNC: 26 MMOL/L (ref 23–29)
CREAT SERPL-MCNC: 0.8 MG/DL (ref 0.5–1.4)
CRP SERPL-MCNC: 3.5 MG/L
ERYTHROCYTE [DISTWIDTH] IN BLOOD BY AUTOMATED COUNT: 13.6 % (ref 11.5–14.5)
ERYTHROCYTE [SEDIMENTATION RATE] IN BLOOD BY PHOTOMETRIC METHOD: 29 MM/HR
GFR SERPLBLD CREATININE-BSD FMLA CKD-EPI: >60 ML/MIN/1.73/M2
GLUCOSE SERPL-MCNC: 89 MG/DL (ref 70–110)
HCT VFR BLD AUTO: 38.1 % (ref 37–48.5)
HGB BLD-MCNC: 12 GM/DL (ref 12–16)
IMM GRANULOCYTES # BLD AUTO: 0.04 K/UL (ref 0–0.04)
IMM GRANULOCYTES NFR BLD AUTO: 0.5 % (ref 0–0.5)
LYMPHOCYTES # BLD AUTO: 2.27 K/UL (ref 1–4.8)
MCH RBC QN AUTO: 28.5 PG (ref 27–31)
MCHC RBC AUTO-ENTMCNC: 31.5 G/DL (ref 32–36)
MCV RBC AUTO: 91 FL (ref 82–98)
NUCLEATED RBC (/100WBC) (OHS): 0 /100 WBC
PLATELET # BLD AUTO: 323 K/UL (ref 150–450)
PMV BLD AUTO: 9.3 FL (ref 9.2–12.9)
POTASSIUM SERPL-SCNC: 4.3 MMOL/L (ref 3.5–5.1)
PROT SERPL-MCNC: 7.5 GM/DL (ref 6–8.4)
RBC # BLD AUTO: 4.21 M/UL (ref 4–5.4)
RELATIVE EOSINOPHIL (OHS): 8.3 %
RELATIVE LYMPHOCYTE (OHS): 27.8 % (ref 18–48)
RELATIVE MONOCYTE (OHS): 9.7 % (ref 4–15)
RELATIVE NEUTROPHIL (OHS): 52.5 % (ref 38–73)
SODIUM SERPL-SCNC: 140 MMOL/L (ref 136–145)
WBC # BLD AUTO: 8.17 K/UL (ref 3.9–12.7)

## 2025-07-08 PROCEDURE — 1101F PT FALLS ASSESS-DOCD LE1/YR: CPT | Mod: CPTII,S$GLB,, | Performed by: INTERNAL MEDICINE

## 2025-07-08 PROCEDURE — 36415 COLL VENOUS BLD VENIPUNCTURE: CPT

## 2025-07-08 PROCEDURE — 85652 RBC SED RATE AUTOMATED: CPT

## 2025-07-08 PROCEDURE — 86140 C-REACTIVE PROTEIN: CPT

## 2025-07-08 PROCEDURE — 1160F RVW MEDS BY RX/DR IN RCRD: CPT | Mod: CPTII,S$GLB,, | Performed by: INTERNAL MEDICINE

## 2025-07-08 PROCEDURE — 84155 ASSAY OF PROTEIN SERUM: CPT

## 2025-07-08 PROCEDURE — 99214 OFFICE O/P EST MOD 30 MIN: CPT | Mod: S$GLB,,, | Performed by: INTERNAL MEDICINE

## 2025-07-08 PROCEDURE — 85025 COMPLETE CBC W/AUTO DIFF WBC: CPT

## 2025-07-08 PROCEDURE — 3075F SYST BP GE 130 - 139MM HG: CPT | Mod: CPTII,S$GLB,, | Performed by: INTERNAL MEDICINE

## 2025-07-08 PROCEDURE — 1125F AMNT PAIN NOTED PAIN PRSNT: CPT | Mod: CPTII,S$GLB,, | Performed by: INTERNAL MEDICINE

## 2025-07-08 PROCEDURE — 1159F MED LIST DOCD IN RCRD: CPT | Mod: CPTII,S$GLB,, | Performed by: INTERNAL MEDICINE

## 2025-07-08 PROCEDURE — 3288F FALL RISK ASSESSMENT DOCD: CPT | Mod: CPTII,S$GLB,, | Performed by: INTERNAL MEDICINE

## 2025-07-08 PROCEDURE — 99999 PR PBB SHADOW E&M-EST. PATIENT-LVL V: CPT | Mod: PBBFAC,,, | Performed by: INTERNAL MEDICINE

## 2025-07-08 PROCEDURE — 3078F DIAST BP <80 MM HG: CPT | Mod: CPTII,S$GLB,, | Performed by: INTERNAL MEDICINE

## 2025-07-08 RX ORDER — AMLODIPINE BESYLATE 10 MG/1
1 TABLET ORAL DAILY
COMMUNITY

## 2025-07-08 RX ORDER — ATORVASTATIN CALCIUM 20 MG/1
TABLET, FILM COATED ORAL
COMMUNITY

## 2025-07-08 RX ORDER — MUPIROCIN 20 MG/G
OINTMENT TOPICAL 2 TIMES DAILY
Qty: 22 G | Refills: 1 | Status: SHIPPED | OUTPATIENT
Start: 2025-07-08

## 2025-07-08 RX ORDER — SULFAMETHOXAZOLE AND TRIMETHOPRIM 800; 160 MG/1; MG/1
1 TABLET ORAL 2 TIMES DAILY
Qty: 14 TABLET | Refills: 0 | Status: SHIPPED | OUTPATIENT
Start: 2025-07-08

## 2025-07-08 NOTE — PROGRESS NOTES
Subjective:      History of Present Illness    CHIEF COMPLAINT:  Lesly presents for follow-up of back pain after receiving injections and to address a new rash and itching on the back.    HPI:  Lesly received injections in both sides of her back on June 20th, including two facet injections at L3-L4 and L4-L5. Initially, the injections provided some relief during the first week, but the pain has since returned. Following the injections, she developed a rash on her back, extending from the injection sites down to the lower back and up to the bra line. The rash is characterized by itching and small bumps, with the affected area being tender.    On June 27th, she was evaluated at the emergency room due to the rash and itching. Noted to have scratches over skin colored papules. She was prescribed Keflex and antihistamines. She has not been taking the Keflex as prescribed, only taking it once daily instead of 4 times daily.    She has been using various creams to alleviate the itching, including an OTC hydrocortisone cream, which provided some relief. The rash and itching are primarily located on the back, not extending to the belly. The itching has been severe enough to significantly impact her mental state.    She was evaluated by a physical therapist yesterday.    Her home blood pressure readings have been around 130/60-70, with one instance of elevated blood pressure when she was upset.    She denies having any itching before the injections or having shingles.    ROS:  Musculoskeletal: +back pain  Skin: +rash, +lesion, +itching, +lumps/masses               Past medical history, surgical history, and family medical history reviewed and updated as appropriate.    Medications and allergies reviewed.     Objective:          Vitals:    07/08/25 0841 07/08/25 0925   BP: (!) 148/66 136/60   BP Location: Right arm    Patient Position: Sitting    Pulse: 81    Resp: 20    SpO2: 97%    Weight: 86.4 kg (190 lb 7.6 oz)      Body  mass index is 32.7 kg/m².  Physical Exam  Constitutional:       Appearance: She is well-developed.   HENT:      Head: Normocephalic and atraumatic.   Eyes:      Extraocular Movements: Extraocular movements intact.   Cardiovascular:      Rate and Rhythm: Normal rate and regular rhythm.      Heart sounds: Normal heart sounds.   Pulmonary:      Effort: Pulmonary effort is normal. No respiratory distress.      Breath sounds: Normal breath sounds. No wheezing.   Abdominal:      General: Bowel sounds are normal. There is no distension.      Palpations: Abdomen is soft.      Tenderness: There is no abdominal tenderness.   Musculoskeletal:         General: No tenderness. Normal range of motion.      Cervical back: Normal range of motion.   Skin:     General: Skin is warm and dry.      Comments: Cellulitic changes on lower back with indurated injection sites and warmth   Neurological:      Mental Status: She is alert and oriented to person, place, and time.      Cranial Nerves: No cranial nerve deficit.      Deep Tendon Reflexes: Reflexes are normal and symmetric.         Lab Results   Component Value Date    WBC 9.72 05/01/2025    HGB 12.3 05/01/2025    HCT 40.2 05/01/2025     05/01/2025    CHOL 150 05/01/2025    TRIG 96 05/01/2025    HDL 41 05/01/2025    ALT 12 05/01/2025    AST 15 05/01/2025     05/01/2025    K 4.2 05/01/2025     05/01/2025    CREATININE 0.9 05/01/2025    BUN 24 (H) 05/01/2025    CO2 22 (L) 05/01/2025    TSH 1.318 05/01/2025    INR 1.0 10/20/2008    HGBA1C 6.1 (H) 05/01/2025       Assessment:       1. Cellulitis of lower back    2. Primary hypertension    3. Chronic bilateral low back pain without sciatica          Plan:     Lesly was seen today for recurrent skin infections.    Diagnoses and all orders for this visit:    Cellulitis of lower back  Comments:  only took keflex once a day instead of four times a day.  Orders:  -     sulfamethoxazole-trimethoprim 800-160mg (BACTRIM DS)  800-160 mg Tab; Take 1 tablet by mouth 2 (two) times daily.  -     CBC Auto Differential; Future  -     Comprehensive Metabolic Panel; Future  -     Sedimentation rate; Future  -     C-Reactive Protein (In-House); Future  -     mupirocin (BACTROBAN) 2 % ointment; Apply topically 2 (two) times daily.    Primary hypertension  Comments:  136/60 today in clinic, on amlodipine 10, losartan-HCTZ 100-25, metoprolol 25 bid; 130s/60-70s at home. elevated when upset    Chronic bilateral low back pain without sciatica  Comments:  pt had b/l facet injections with PM&R. back pain persists, was supposed to follow up 2 weeks after procedure, message sent to provider to be scheduled        Health maintenance reviewed with patient.   Follow up in about 3 months (around 10/8/2025).    Fred Fisher MD  Internal Medicine / Primary Care  [facility]  7/8/2025    This note was generated with the assistance of ambient listening technology. Verbal consent was obtained by the patient and accompanying visitor(s) for the recording of patient appointment to facilitate this note. I attest to having reviewed and edited the generated note for accuracy, though some syntax or spelling errors may persist. Please contact the author of this note for any clarification.

## 2025-07-08 NOTE — PATIENT INSTRUCTIONS
Labs today.    Take bactrim DS 1 tablet twice a day for 7 days.   Apply mupirocin ointment twice a day for 7 days.    Return to clinic in 3 months.

## 2025-07-08 NOTE — Clinical Note
Pt s/p b/l facet injections, was supposed to f/u 2 weeks after procedure. Procedure done on 6/20/2025 with mild improvement, but now worsening. Has injection site complications (cellulitis) and is being treated with oral antibiotics. Can we please get her scheduled for follow up ASAP. Thank you.

## 2025-07-09 ENCOUNTER — TELEPHONE (OUTPATIENT)
Dept: PAIN MEDICINE | Facility: CLINIC | Age: 80
End: 2025-07-09
Payer: MEDICARE

## 2025-07-09 NOTE — TELEPHONE ENCOUNTER
Staff contacted the patient to assist with getting her scheduled for an evaluation after procedure 06/20/25. Patient did not answer staff left a message for a call back.

## 2025-07-09 NOTE — TELEPHONE ENCOUNTER
----- Message from Fred Fisher MD sent at 7/8/2025  9:20 AM CDT -----  Pt s/p b/l facet injections, was supposed to f/u 2 weeks after procedure. Procedure done on 6/20/2025 with mild improvement, but now worsening. Has injection site complications (cellulitis) and is being treated with oral antibiotics. Can we please get her scheduled for follow up ASAP. Thank you.

## 2025-07-11 ENCOUNTER — TELEPHONE (OUTPATIENT)
Dept: INTERNAL MEDICINE | Facility: CLINIC | Age: 80
End: 2025-07-11
Payer: MEDICARE

## 2025-07-11 NOTE — TELEPHONE ENCOUNTER
Copied from CRM #[id number]. Topic: General Inquiry - Patient Advice  >> Jul 11, 2025  7:47 AM Maris wrote:  .1MEDICALADVICE     Patient is calling for Medical Advice regarding:Patient would like something else for her itching because she says the cream is not working    How long has patient had these symptoms:over a week    Pharmacy name and phone#:[facility]/pharmacy #[id number] - [place] - [address]  [address] [place] [address]  Phone: [phone] Fax: [phone]        Patient wants a call back or thru [facility], provide patient's call back phone number:[phone] (M)    Comments: Please call to confirm when something has been called in today please.

## 2025-07-18 ENCOUNTER — TELEPHONE (OUTPATIENT)
Dept: INTERNAL MEDICINE | Facility: CLINIC | Age: 80
End: 2025-07-18
Payer: MEDICARE

## 2025-07-18 ENCOUNTER — DOCUMENTATION ONLY (OUTPATIENT)
Dept: REHABILITATION | Facility: OTHER | Age: 80
End: 2025-07-18
Payer: MEDICARE

## 2025-07-18 DIAGNOSIS — L29.9 ITCHING: Primary | ICD-10-CM

## 2025-07-18 RX ORDER — CALAMINE, PRAMOXIND HCL 8.6; 20; 1 MG/ML; MG/ML; MG/ML
LOTION TOPICAL 2 TIMES DAILY PRN
Qty: 177 ML | Refills: 0 | Status: SHIPPED | OUTPATIENT
Start: 2025-07-18

## 2025-07-18 NOTE — TELEPHONE ENCOUNTER
Called pt and left message that dr. Fisher sent a rx lotion for her to try and sent it to her pharmacy.

## 2025-07-18 NOTE — PROGRESS NOTES
Patient no call/no show for 2nd straight f/u appointment.    PT, Rory, called patient to assess interest in continuing PT care.  Patient state she is needing to prioritize her itch and rash before progressing with PT.  Patient anticipates improved rash condition next week.  Therefore, it was decided to cancel PT for next week but remain on schedule for future visits.  PT alerted patient to next scheduled appointments on July 28th and 30th at 9 am.  PT advised patient to call [facility] (phone number provided) if patient would not be able to attend.  Patient verbalize appointment times and understanding.        Rory Castaneda, PT

## 2025-07-18 NOTE — TELEPHONE ENCOUNTER
Copied from CRM #[id number]. Topic: General Inquiry - Patient Advice  >> Jul 18, 2025  8:21 AM Sindi wrote:  .[email or web address]     Patient is calling for Medical Advice regarding:Status check on Medication     How long has patient had these symptoms:    Pharmacy name and phone#: [id number] - [place] - [address]  [address]  [place] [address]  Phone: [phone] Fax: [phone]       Patient wants a call back or thru [facility], provide patient's call back phone number:call patient back at:[phone] or [phone]    Comments: pt said that she is calling in regards to needing to check the status of a rx for itching that she requested on last week that the doctor still has not sent to the pharmacy for the itching that she has and the medication she currently has is not working Please advise     Please advise patient replies from provider may take up to 48 hours.

## 2025-07-18 NOTE — TELEPHONE ENCOUNTER
Patient is calling because she is itching and wants something sent to the pharmacy. [facility] [address] [facility] [address] of [facility]. Benadryl did not work. Would like something other that ointment.

## 2025-07-28 ENCOUNTER — CLINICAL SUPPORT (OUTPATIENT)
Dept: REHABILITATION | Facility: OTHER | Age: 80
End: 2025-07-28
Payer: MEDICARE

## 2025-07-28 DIAGNOSIS — M54.41 CHRONIC BILATERAL LOW BACK PAIN WITH BILATERAL SCIATICA: Primary | ICD-10-CM

## 2025-07-28 DIAGNOSIS — M53.86 DECREASED RANGE OF MOTION OF INTERVERTEBRAL DISCS OF LUMBAR SPINE: ICD-10-CM

## 2025-07-28 DIAGNOSIS — M54.42 CHRONIC BILATERAL LOW BACK PAIN WITH BILATERAL SCIATICA: Primary | ICD-10-CM

## 2025-07-28 DIAGNOSIS — G89.29 CHRONIC BILATERAL LOW BACK PAIN WITH BILATERAL SCIATICA: Primary | ICD-10-CM

## 2025-07-28 PROCEDURE — 97110 THERAPEUTIC EXERCISES: CPT | Mod: CQ

## 2025-07-28 NOTE — PROGRESS NOTES
Outpatient Rehab    Physical Therapy Visit    Patient Name: Lesly Jacob  MRN: [id number]  YOB: 1945  Encounter Date: 7/28/2025    Therapy Diagnosis:   Encounter Diagnoses   Name Primary?    Chronic bilateral low back pain with bilateral sciatica Yes    Decreased range of motion of intervertebral discs of lumbar spine      Physician: Johnnie Garcia MD    Physician Orders: Eval and Treat  Medical Diagnosis: Lumbar spondylosis  Facet arthritis of lumbar region  Surgical Diagnosis: Not applicable for this Episode   Surgical Date: Not applicable for this Episode  Days Since Last Surgery: Not applicable for this Episode    Visit # / Visits Authorized:  1 / 20  Insurance Authorization Period: 7/7/2025 to 12/31/2025  Date of Evaluation: 7/7/2025  Plan of Care Certification:       PT/PTA: PTA   Number of PTA visits since last PT visit:1  Time In: 0905   Time Out: 0955  Total Time (in minutes): 50   Total Billable Time (in minutes): 25    FOTO:  Intake Score (%): 31  Survey Score 2 (%): 46  Survey Score 3 (%): Not applicable for this Episode    Precautions:         Subjective   She doesn't think she can keep coming becuase it is too expensive and she has to get a ride here. She might be able to come one time a week. She has been doing her exercises at home..  Pain reported as 8/10.      Objective            Treatment:  Therapeutic Exercise  TE 1: Nu step x 6'  TE 2: Bridges 2 x 10, SL clams YTB 2 x 10  TE 3: Open books x 10 ea  TE 4: Standing hip abd /ext x 10 ea  TE 5: STS from chair 1 airex 2 x 10  Balance/Neuromuscular Re-Education  NMR 2: NA  NMR 4: NA    Time Entry(in minutes):  Therapeutic Exercise Time Entry: 50    Assessment & Plan   Assessment: Pt presents to first follow up with high levels of pain. Reviewed HEP with pt able to perform with mod cues. Pt missed 3 weeks of therapy due to financial issues. Updated HEP with additional exercises to perform between therapy visits as pt can only  come 1 x a week.   Evaluation/Treatment Tolerance: Patient tolerated treatment well    The patient will continue to benefit from skilled outpatient physical therapy in order to address the deficits listed in the problem list on the initial evaluation, provide patient and family education, and maximize the patients level of independence in the home and community environments.     The patient's spiritual, cultural, and educational needs were considered, and the patient is agreeable to the plan of care and goals.           Plan: Continue POC    Goals:   Active       Long-term goals       Pt to improve gross lumbar range of motion by 90% to allow for improved functional mobility to allow for improvement in IADL's.        Start:  07/07/25    Expected End:  09/01/25            Increased gross hip internal/external rotation and transverse abdominus MMT 1 to increase tolerance for ADL and work activities.        Start:  07/07/25    Expected End:  09/01/25            Pt will scores report a 46% or greater score on FOTO hip survey  to demonstrate increase in LE function with every day tasks.       Start:  07/07/25    Expected End:  09/01/25            Pt to be Independent with HEP to improve ROM and independence with ADL's.       Start:  07/07/25    Expected End:  09/01/25            Patient will be able to walk at least 1 block with no greater than 3/10 low back pain in order to improve tolerance to community ambulation.       Start:  07/07/25    Expected End:  09/01/25               Short-term goals       Report decreased in pain at worse less than  <  / =  7/10  to increase tolerance for functional activities.       Start:  07/07/25    Expected End:  08/04/25            Pt to improve gross lumbar range of motion by 50% to allow for improved functional mobility to allow for improvement in IADL's.       Start:  07/07/25    Expected End:  08/04/25            Increased gross hip internal/external rotation and transverse  abdominus MMT 1/2  to increase tolerance for ADL and work activities.        Start:  07/07/25    Expected End:  08/04/25            Patient will be able to tolerate lift at least a 10 lb. object from knee height to chest height and carry 50 ft. with no greater than 3/10 low back pain.       Start:  07/07/25    Expected End:  08/04/25            Pt to tolerate HEP to improve ROM and independence with ADL's.       Start:  07/07/25    Expected End:  08/04/25                Robert Cordero, PTA

## 2025-08-04 NOTE — PROGRESS NOTES
Outpatient Rehab    Physical Therapy Visit    Patient Name: Lesly Jacob  MRN: [id number]  YOB: 1945  Encounter Date: 8/6/2025    Therapy Diagnosis: No diagnosis found.  Physician: Johnnie Garcia MD    Physician Orders: Eval and Treat  Medical Diagnosis: Lumbar spondylosis  Facet arthritis of lumbar region  Surgical Diagnosis: Not applicable for this Episode   Surgical Date: Not applicable for this Episode  Days Since Last Surgery: Not applicable for this Episode    Visit # / Visits Authorized:  1 / 20  Insurance Authorization Period: 7/7/2025 to 12/31/2025  Date of Evaluation:   Plan of Care Certification:       PT/PTA:  PT  Number of PTA visits since last PT visit: NA  Time In:     Time Out:    Total Time (in minutes):     Total Billable Time (in minutes):      FOTO:  Intake Score (%): Not applicable for this Episode  Survey Score 2 (%): Not applicable for this Episode  Survey Score 3 (%): Not applicable for this Episode    Precautions:         Subjective             Objective            Treatment:       Time Entry(in minutes):       Assessment & Plan   Assessment:         The patient will continue to benefit from skilled outpatient physical therapy in order to address the deficits listed in the problem list on the initial evaluation, provide patient and family education, and maximize the patients level of independence in the home and community environments.     The patient's spiritual, cultural, and educational needs were considered, and the patient is agreeable to the plan of care and goals.           Plan:      Goals:     Jason Goyal, PT, DPT

## 2025-08-05 ENCOUNTER — HOSPITAL ENCOUNTER (EMERGENCY)
Facility: HOSPITAL | Age: 80
Discharge: HOME | End: 2025-08-05
Attending: EMERGENCY MEDICINE
Payer: MEDICARE

## 2025-08-05 VITALS
OXYGEN SATURATION: 93 % | HEART RATE: 75 BPM | DIASTOLIC BLOOD PRESSURE: 65 MMHG | SYSTOLIC BLOOD PRESSURE: 152 MMHG | WEIGHT: 186.5 LBS | TEMPERATURE: 98 F | HEIGHT: 64 IN | RESPIRATION RATE: 19 BRPM | BODY MASS INDEX: 31.84 KG/M2

## 2025-08-05 DIAGNOSIS — F40.240 CLAUSTROPHOBIA: ICD-10-CM

## 2025-08-05 DIAGNOSIS — M54.41 ACUTE BILATERAL LOW BACK PAIN WITH BILATERAL SCIATICA: Primary | ICD-10-CM

## 2025-08-05 DIAGNOSIS — M54.42 ACUTE BILATERAL LOW BACK PAIN WITH BILATERAL SCIATICA: Primary | ICD-10-CM

## 2025-08-05 LAB
ABSOLUTE EOSINOPHIL (OHS): 0.52 K/UL
ABSOLUTE MONOCYTE (OHS): 0.97 K/UL (ref 0.3–1)
ABSOLUTE NEUTROPHIL COUNT (OHS): 6.47 K/UL (ref 1.8–7.7)
ALBUMIN SERPL BCP-MCNC: 3.6 G/DL (ref 3.5–5.2)
ALP SERPL-CCNC: 69 UNIT/L (ref 40–150)
ALT SERPL W/O P-5'-P-CCNC: 10 UNIT/L (ref 0–55)
ANION GAP (OHS): 12 MMOL/L (ref 8–16)
AST SERPL-CCNC: 27 UNIT/L (ref 0–50)
BASOPHILS # BLD AUTO: 0.07 K/UL
BASOPHILS NFR BLD AUTO: 0.6 %
BILIRUB SERPL-MCNC: 0.3 MG/DL (ref 0.1–1)
BILIRUB UR QL STRIP.AUTO: NEGATIVE
BUN SERPL-MCNC: 23 MG/DL (ref 8–23)
CALCIUM SERPL-MCNC: 8.8 MG/DL (ref 8.7–10.5)
CHLORIDE SERPL-SCNC: 105 MMOL/L (ref 95–110)
CLARITY UR: CLEAR
CO2 SERPL-SCNC: 21 MMOL/L (ref 23–29)
COLOR UR AUTO: YELLOW
CREAT SERPL-MCNC: 0.9 MG/DL (ref 0.5–1.4)
ERYTHROCYTE [DISTWIDTH] IN BLOOD BY AUTOMATED COUNT: 15.2 % (ref 11.5–14.5)
GFR SERPLBLD CREATININE-BSD FMLA CKD-EPI: >60 ML/MIN/1.73/M2
GLUCOSE SERPL-MCNC: 135 MG/DL (ref 70–110)
GLUCOSE UR QL STRIP: NEGATIVE
HCT VFR BLD AUTO: 35.7 % (ref 37–48.5)
HCV AB SERPL QL IA: NORMAL
HGB BLD-MCNC: 11.5 GM/DL (ref 12–16)
HGB UR QL STRIP: NEGATIVE
HIV 1+2 AB+HIV1 P24 AG SERPL QL IA: NORMAL
IMM GRANULOCYTES # BLD AUTO: 0.05 K/UL (ref 0–0.04)
IMM GRANULOCYTES NFR BLD AUTO: 0.5 % (ref 0–0.5)
KETONES UR QL STRIP: NEGATIVE
LEUKOCYTE ESTERASE UR QL STRIP: NEGATIVE
LYMPHOCYTES # BLD AUTO: 2.89 K/UL (ref 1–4.8)
MCH RBC QN AUTO: 28.4 PG (ref 27–31)
MCHC RBC AUTO-ENTMCNC: 32.2 G/DL (ref 32–36)
MCV RBC AUTO: 88 FL (ref 82–98)
NITRITE UR QL STRIP: NEGATIVE
NUCLEATED RBC (/100WBC) (OHS): 0 /100 WBC
PH UR STRIP: 6 [PH]
PLATELET # BLD AUTO: 216 K/UL (ref 150–450)
PMV BLD AUTO: 9.7 FL (ref 9.2–12.9)
POTASSIUM SERPL-SCNC: 4.6 MMOL/L (ref 3.5–5.1)
PROT SERPL-MCNC: 7.4 GM/DL (ref 6–8.4)
PROT UR QL STRIP: NEGATIVE
RBC # BLD AUTO: 4.05 M/UL (ref 4–5.4)
RELATIVE EOSINOPHIL (OHS): 4.7 %
RELATIVE LYMPHOCYTE (OHS): 26.3 % (ref 18–48)
RELATIVE MONOCYTE (OHS): 8.8 % (ref 4–15)
RELATIVE NEUTROPHIL (OHS): 59.1 % (ref 38–73)
SODIUM SERPL-SCNC: 138 MMOL/L (ref 136–145)
SP GR UR STRIP: 1.01
UROBILINOGEN UR STRIP-ACNC: NEGATIVE EU/DL
WBC # BLD AUTO: 10.97 K/UL (ref 3.9–12.7)

## 2025-08-05 PROCEDURE — 85025 COMPLETE CBC W/AUTO DIFF WBC: CPT | Performed by: EMERGENCY MEDICINE

## 2025-08-05 PROCEDURE — 87389 HIV-1 AG W/HIV-1&-2 AB AG IA: CPT | Performed by: PHYSICIAN ASSISTANT

## 2025-08-05 PROCEDURE — 96374 THER/PROPH/DIAG INJ IV PUSH: CPT

## 2025-08-05 PROCEDURE — 86803 HEPATITIS C AB TEST: CPT | Performed by: PHYSICIAN ASSISTANT

## 2025-08-05 PROCEDURE — 63600175 PHARM REV CODE 636 W HCPCS: Performed by: EMERGENCY MEDICINE

## 2025-08-05 PROCEDURE — 81003 URINALYSIS AUTO W/O SCOPE: CPT | Performed by: EMERGENCY MEDICINE

## 2025-08-05 PROCEDURE — 82374 ASSAY BLOOD CARBON DIOXIDE: CPT | Performed by: EMERGENCY MEDICINE

## 2025-08-05 PROCEDURE — 99284 EMERGENCY DEPT VISIT MOD MDM: CPT | Mod: 25

## 2025-08-05 RX ORDER — HYDROMORPHONE HYDROCHLORIDE 1 MG/ML
1 INJECTION, SOLUTION INTRAMUSCULAR; INTRAVENOUS; SUBCUTANEOUS
Refills: 0 | Status: COMPLETED | OUTPATIENT
Start: 2025-08-05 | End: 2025-08-05

## 2025-08-05 RX ADMIN — HYDROMORPHONE HYDROCHLORIDE 1 MG: 1 INJECTION, SOLUTION INTRAMUSCULAR; INTRAVENOUS; SUBCUTANEOUS at 09:08

## 2025-08-06 ENCOUNTER — CLINICAL SUPPORT (OUTPATIENT)
Dept: REHABILITATION | Facility: OTHER | Age: 80
End: 2025-08-06
Payer: MEDICARE

## 2025-08-06 DIAGNOSIS — G89.29 CHRONIC BILATERAL LOW BACK PAIN WITH SCIATICA, SCIATICA LATERALITY UNSPECIFIED: Primary | ICD-10-CM

## 2025-08-06 DIAGNOSIS — M54.41 CHRONIC BILATERAL LOW BACK PAIN WITH SCIATICA, SCIATICA LATERALITY UNSPECIFIED: Primary | ICD-10-CM

## 2025-08-06 DIAGNOSIS — M54.42 CHRONIC BILATERAL LOW BACK PAIN WITH SCIATICA, SCIATICA LATERALITY UNSPECIFIED: Primary | ICD-10-CM

## 2025-08-06 DIAGNOSIS — M53.86 DECREASED RANGE OF MOTION OF INTERVERTEBRAL DISCS OF LUMBAR SPINE: ICD-10-CM

## 2025-08-06 LAB — HOLD SPECIMEN: NORMAL

## 2025-08-06 PROCEDURE — 97110 THERAPEUTIC EXERCISES: CPT | Performed by: PHYSICAL THERAPIST

## 2025-08-06 PROCEDURE — 97014 ELECTRIC STIMULATION THERAPY: CPT | Performed by: PHYSICAL THERAPIST

## 2025-08-06 PROCEDURE — 97530 THERAPEUTIC ACTIVITIES: CPT | Performed by: PHYSICAL THERAPIST

## 2025-08-06 NOTE — PROGRESS NOTES
Outpatient Rehab    Physical Therapy Progress Note    Patient Name: Lesly Jacob  MRN: [id number]  YOB: 1945  Encounter Date: 8/6/2025    Therapy Diagnosis:   Encounter Diagnoses   Name Primary?    Chronic bilateral low back pain with sciatica, sciatica laterality unspecified Yes    Decreased range of motion of intervertebral discs of lumbar spine      Physician: Johnnie Garcia MD    Physician Orders: Eval and Treat  Medical Diagnosis: Lumbar spondylosis  Facet arthritis of lumbar region  Surgical Diagnosis: Not applicable for this Episode   Surgical Date: Not applicable for this Episode  Days Since Last Surgery: Not applicable for this Episode    Visit # / Visits Authorized:  2 / 20  Insurance Authorization Period: 7/7/2025 to 12/31/2025  Date of Evaluation: 7/7/2025  Plan of Care Certification:    Progress Note Date Range: 7/7/25 to 8/6/2025     PT/PTA:  PT  Number of PTA visits since last PT visit: NA  Time In: 1000   Time Out: 1045  Total Time (in minutes): 45   Total Billable Time (in minutes): 40    FOTO:  Intake Score (%): 31  Survey Score 2 (%): TBD  Survey Score 3 (%): Not applicable for this Episode    Precautions:  Additional Precautions and Protocol Details: Standard: severe pain level    Subjective   Patient reports that she has been having a lot of pain that started the night before last. She ended up having to go to the ER last night because her pain level was so severe. They told her to make sure she came to her session today. She has been trying to keep up with her exercises, but was not able to do them yesterday when her pain level was so high..  Pain reported as 8/10.      Objective        GAIT DEVIATIONS: Lesly displays decreased step length;antalgic gait;flexed posturing    Lumbar Range of Motion:    %   Flexion 50 *     Extension 25 *     Left Side Bending 25 *   Right Side Bending 25 *   Left rotation   50 *   Right Rotation   50 *    *= pain    Palpation: very TTP  to lumbar paraspinals and with gentle lumbar central and unilateral P/As    Treatment:  Therapeutic Exercise  TE 6: Supine hooklying with focus on deep breathing x 3 minutes  TE 7: LTRs x 10 reps with focus on deep breaths  TE 8: HEP review  Therapeutic Activity  TA 1: Reassessment  TA 2: Sitting and sleeping modifications to help with pain level  TA 3: Flare up management  TA 4: Log rolling for transfers  Modalities  Electrical Stimulation (Parameters): FDN to B lumbar multifidi L3-L5 x 4 points with 4 hz estim x 15 minutes to stimulate musculature with good tolernace and no increase in adverse symptoms    Time Entry(in minutes):       Assessment & Plan   Assessment: Patient was having a severe up in the last couple of days, so focused on pain relief and education on what to do in flare ups as well as lumbar roll for comfort in sitting. Patient's ROM very limited this session for reassessment, but did have improvement in pain level from severe to moderate by the end of the session. Patient is still having significant ROM deficits, severe pain/adverse symptoms, decreased strength, decreased joint mobility, and decreased tolerance to activity. Patient needs continued skilled therapy in order to address the above deficits to improve quality of life and return to PLOF.       The patient will continue to benefit from skilled outpatient physical therapy in order to address the deficits listed in the problem list on the initial evaluation, provide patient and family education, and maximize the patients level of independence in the home and community environments.     The patient's spiritual, cultural, and educational needs were considered, and the patient is agreeable to the plan of care and goals.           Plan: Continue POC- FDN as needed- more IND HEP due to patient having difficulty getting to sessions    Goals:   Active       Long-term goals       Pt to improve gross lumbar range of motion by 90% to allow for  improved functional mobility to allow for improvement in IADL's.        Start:  07/07/25    Expected End:  09/01/25            Increased gross hip internal/external rotation and transverse abdominus MMT 1 to increase tolerance for ADL and work activities.        Start:  07/07/25    Expected End:  09/01/25            Pt will scores report a 46% or greater score on FOTO hip survey  to demonstrate increase in LE function with every day tasks.       Start:  07/07/25    Expected End:  09/01/25            Pt to be Independent with HEP to improve ROM and independence with ADL's.       Start:  07/07/25    Expected End:  09/01/25            Patient will be able to walk at least 1 block with no greater than 3/10 low back pain in order to improve tolerance to community ambulation.       Start:  07/07/25    Expected End:  09/01/25               Short-term goals       Report decreased in pain at worse less than  <  / =  7/10  to increase tolerance for functional activities.       Start:  07/07/25    Expected End:  08/04/25            Pt to improve gross lumbar range of motion by 50% to allow for improved functional mobility to allow for improvement in IADL's.       Start:  07/07/25    Expected End:  08/04/25            Increased gross hip internal/external rotation and transverse abdominus MMT 1/2  to increase tolerance for ADL and work activities.        Start:  07/07/25    Expected End:  08/04/25            Patient will be able to tolerate lift at least a 10 lb. object from knee height to chest height and carry 50 ft. with no greater than 3/10 low back pain.       Start:  07/07/25    Expected End:  08/04/25            Pt to tolerate HEP to improve ROM and independence with ADL's.       Start:  07/07/25    Expected End:  08/04/25                Jason Goyal, PT, DPT

## 2025-08-08 LAB — HOLD SPECIMEN: NORMAL

## 2025-08-11 ENCOUNTER — CLINICAL SUPPORT (OUTPATIENT)
Dept: REHABILITATION | Facility: OTHER | Age: 80
End: 2025-08-11
Payer: MEDICARE

## 2025-08-11 DIAGNOSIS — M54.41 CHRONIC BILATERAL LOW BACK PAIN WITH BILATERAL SCIATICA: Primary | ICD-10-CM

## 2025-08-11 DIAGNOSIS — M54.42 CHRONIC BILATERAL LOW BACK PAIN WITH BILATERAL SCIATICA: Primary | ICD-10-CM

## 2025-08-11 DIAGNOSIS — G89.29 CHRONIC BILATERAL LOW BACK PAIN WITH BILATERAL SCIATICA: Primary | ICD-10-CM

## 2025-08-11 DIAGNOSIS — M25.661 DECREASED RANGE OF MOTION OF BOTH KNEES: ICD-10-CM

## 2025-08-11 DIAGNOSIS — M53.86 DECREASED RANGE OF MOTION OF INTERVERTEBRAL DISCS OF LUMBAR SPINE: ICD-10-CM

## 2025-08-11 DIAGNOSIS — M25.662 DECREASED RANGE OF MOTION OF BOTH KNEES: ICD-10-CM

## 2025-08-11 PROBLEM — M25.669 DECREASED RANGE OF MOTION (ROM) OF KNEE: Status: ACTIVE | Noted: 2025-08-11

## 2025-08-11 PROBLEM — M25.669 DECREASED RANGE OF MOTION (ROM) OF KNEE: Status: RESOLVED | Noted: 2025-08-11 | Resolved: 2025-08-11

## 2025-08-11 PROCEDURE — 97010 HOT OR COLD PACKS THERAPY: CPT

## 2025-08-11 PROCEDURE — 97112 NEUROMUSCULAR REEDUCATION: CPT

## 2025-08-11 PROCEDURE — 97110 THERAPEUTIC EXERCISES: CPT
